# Patient Record
Sex: FEMALE | Race: WHITE | NOT HISPANIC OR LATINO | Employment: FULL TIME | RURAL
[De-identification: names, ages, dates, MRNs, and addresses within clinical notes are randomized per-mention and may not be internally consistent; named-entity substitution may affect disease eponyms.]

---

## 2015-12-14 LAB — CRC RECOMMENDATION EXT: NORMAL

## 2018-10-31 ENCOUNTER — HISTORICAL (OUTPATIENT)
Dept: ADMINISTRATIVE | Facility: HOSPITAL | Age: 53
End: 2018-10-31

## 2018-11-02 LAB
LAB AP CLINICAL INFORMATION: NORMAL
LAB AP DIAGNOSIS - HISTORICAL: NORMAL
LAB AP GROSS PATHOLOGY - HISTORICAL: NORMAL
LAB AP SPECIMEN SUBMITTED - HISTORICAL: NORMAL

## 2019-12-09 ENCOUNTER — HISTORICAL (OUTPATIENT)
Dept: ADMINISTRATIVE | Facility: HOSPITAL | Age: 54
End: 2019-12-09

## 2019-12-11 LAB
LAB AP COMMENTS: NORMAL
LAB AP GENERAL CAT - HISTORICAL: NORMAL
LAB AP INTERPRETATION/RESULT - HISTORICAL: NEGATIVE
LAB AP SPECIMEN ADEQUACY - HISTORICAL: NORMAL
LAB AP SPECIMEN SUBMITTED - HISTORICAL: NORMAL

## 2020-09-15 ENCOUNTER — HISTORICAL (OUTPATIENT)
Dept: ADMINISTRATIVE | Facility: HOSPITAL | Age: 55
End: 2020-09-15

## 2021-02-24 ENCOUNTER — HISTORICAL (OUTPATIENT)
Dept: ADMINISTRATIVE | Facility: HOSPITAL | Age: 56
End: 2021-02-24

## 2021-03-23 ENCOUNTER — HOSPITAL ENCOUNTER (EMERGENCY)
Facility: HOSPITAL | Age: 56
Discharge: HOME | End: 2021-03-23
Payer: COMMERCIAL

## 2021-03-23 VITALS
HEART RATE: 68 BPM | RESPIRATION RATE: 18 BRPM | DIASTOLIC BLOOD PRESSURE: 55 MMHG | WEIGHT: 246 LBS | TEMPERATURE: 99 F | BODY MASS INDEX: 42 KG/M2 | OXYGEN SATURATION: 98 % | SYSTOLIC BLOOD PRESSURE: 119 MMHG | HEIGHT: 64 IN

## 2021-03-23 DIAGNOSIS — G45.9 TIA (TRANSIENT ISCHEMIC ATTACK): Primary | ICD-10-CM

## 2021-03-23 LAB
ALBUMIN SERPL BCP-MCNC: 3.9 G/DL (ref 3.5–5)
ALBUMIN/GLOB SERPL: 1.1 {RATIO}
ALP SERPL-CCNC: 100 U/L (ref 46–118)
ALT SERPL W P-5'-P-CCNC: 37 U/L (ref 13–56)
ANION GAP SERPL CALCULATED.3IONS-SCNC: 5 MMOL/L
APTT PPP: 32.9 SECONDS (ref 25.2–37.3)
AST SERPL W P-5'-P-CCNC: 23 U/L (ref 15–37)
BASOPHILS # BLD AUTO: 0.06 X10E3/UL (ref 0–0.2)
BASOPHILS NFR BLD AUTO: 0.7 % (ref 0–1)
BILIRUB SERPL-MCNC: 0.2 MG/DL (ref 0–1.2)
BILIRUB UR QL STRIP: NEGATIVE MG/DL
BUN SERPL-MCNC: 17 MG/DL (ref 7–18)
BUN/CREAT SERPL: 19.3
CALCIUM SERPL-MCNC: 8.8 MG/DL (ref 8.5–10.1)
CHLORIDE SERPL-SCNC: 101 MMOL/L (ref 98–107)
CLARITY UR: ABNORMAL
CO2 SERPL-SCNC: 27 MMOL/L (ref 21–32)
COLOR UR: ABNORMAL
CREAT SERPL-MCNC: 0.88 MG/DL (ref 0.55–1.02)
EOSINOPHIL # BLD AUTO: 0.17 X10E3/UL (ref 0–0.5)
EOSINOPHIL NFR BLD AUTO: 2.1 % (ref 1–4)
ERYTHROCYTE [DISTWIDTH] IN BLOOD BY AUTOMATED COUNT: 12.2 % (ref 11.5–14.5)
GLOBULIN SER-MCNC: 3.6 G/DL (ref 2–4)
GLUCOSE SERPL-MCNC: 104 MG/DL (ref 74–106)
GLUCOSE SERPL-MCNC: 135 MG/DL (ref 70–105)
GLUCOSE UR STRIP-MCNC: NEGATIVE MG/DL
HCT VFR BLD AUTO: 38.1 % (ref 38–47)
HGB BLD-MCNC: 12.9 G/DL (ref 12–16)
IMM GRANULOCYTES # BLD AUTO: 0.03 X10E3/UL (ref 0–0.04)
IMM GRANULOCYTES NFR BLD: 0.4 % (ref 0–0.4)
INR BLD: 0.94 (ref 0–3.3)
KETONES UR STRIP-SCNC: NEGATIVE MG/DL
LACTATE SERPL-SCNC: 2 MMOL/L (ref 0.4–2)
LEUKOCYTE ESTERASE UR QL STRIP: NEGATIVE LEU/UL
LYMPHOCYTES # BLD AUTO: 1.7 X10E3/UL (ref 1–4.8)
LYMPHOCYTES NFR BLD AUTO: 21.2 % (ref 27–41)
MAGNESIUM SERPL-MCNC: 2.1 MG/DL (ref 1.7–2.3)
MCH RBC QN AUTO: 30.9 PG (ref 27–31)
MCHC RBC AUTO-ENTMCNC: 33.9 G/DL (ref 32–36)
MCV RBC AUTO: 91.1 FL (ref 80–96)
MONOCYTES # BLD AUTO: 0.46 X10E3/UL (ref 0–0.8)
MONOCYTES NFR BLD AUTO: 5.7 % (ref 2–6)
MPC BLD CALC-MCNC: 11.1 FL (ref 9.4–12.4)
NEUTROPHILS # BLD AUTO: 5.61 X10E3/UL (ref 1.8–7.7)
NEUTROPHILS NFR BLD AUTO: 69.9 % (ref 53–65)
NITRITE UR QL STRIP: NEGATIVE
NRBC # BLD AUTO: 0 X10E3/UL (ref 0–0)
NRBC, AUTO (.00): 0 /100 (ref 0–0)
PH UR STRIP: 5.5 PH UNITS (ref 5–8)
PLATELET # BLD AUTO: 259 X10E3/UL (ref 150–400)
POTASSIUM SERPL-SCNC: 3.3 MMOL/L (ref 3.5–5.1)
PROT SERPL-MCNC: 7.5 G/DL (ref 6.4–8.2)
PROT UR QL STRIP: NEGATIVE MG/DL
PROTHROMBIN TIME: 12.1 SECONDS (ref 11.7–14.7)
RBC # BLD AUTO: 4.18 X10E6/UL (ref 4.2–5.4)
RBC # UR STRIP: NEGATIVE ERY/UL
SODIUM SERPL-SCNC: 130 MMOL/L (ref 136–145)
SP GR UR STRIP: 1.01 (ref 1–1.03)
TROPONIN I SERPL-MCNC: <0.017 NG/ML (ref 0–0.06)
UROBILINOGEN UR STRIP-ACNC: 0.2 EU/DL
WBC # BLD AUTO: 8.03 X10E3/UL (ref 4.5–11)

## 2021-03-23 PROCEDURE — 96374 THER/PROPH/DIAG INJ IV PUSH: CPT

## 2021-03-23 PROCEDURE — 80053 COMPREHEN METABOLIC PANEL: CPT

## 2021-03-23 PROCEDURE — 82962 GLUCOSE BLOOD TEST: CPT

## 2021-03-23 PROCEDURE — 83735 ASSAY OF MAGNESIUM: CPT

## 2021-03-23 PROCEDURE — 99284 EMERGENCY DEPT VISIT MOD MDM: CPT | Mod: ,,, | Performed by: NURSE PRACTITIONER

## 2021-03-23 PROCEDURE — 85730 THROMBOPLASTIN TIME PARTIAL: CPT

## 2021-03-23 PROCEDURE — 83605 ASSAY OF LACTIC ACID: CPT

## 2021-03-23 PROCEDURE — 25000003 PHARM REV CODE 250: Performed by: NURSE PRACTITIONER

## 2021-03-23 PROCEDURE — 96361 HYDRATE IV INFUSION ADD-ON: CPT

## 2021-03-23 PROCEDURE — 63600175 PHARM REV CODE 636 W HCPCS: Performed by: NURSE PRACTITIONER

## 2021-03-23 PROCEDURE — 85025 COMPLETE CBC W/AUTO DIFF WBC: CPT

## 2021-03-23 PROCEDURE — 99285 EMERGENCY DEPT VISIT HI MDM: CPT | Mod: 25

## 2021-03-23 PROCEDURE — 84484 ASSAY OF TROPONIN QUANT: CPT

## 2021-03-23 PROCEDURE — 85610 PROTHROMBIN TIME: CPT

## 2021-03-23 PROCEDURE — 99284 PR EMERGENCY DEPT VISIT,LEVEL IV: ICD-10-PCS | Mod: ,,, | Performed by: NURSE PRACTITIONER

## 2021-03-23 PROCEDURE — 36415 COLL VENOUS BLD VENIPUNCTURE: CPT

## 2021-03-23 RX ORDER — LISINOPRIL 5 MG/1
5 TABLET ORAL DAILY
COMMUNITY
End: 2021-08-16 | Stop reason: SDUPTHER

## 2021-03-23 RX ORDER — LANOLIN ALCOHOL/MO/W.PET/CERES
100 CREAM (GRAM) TOPICAL DAILY
COMMUNITY
End: 2021-09-17

## 2021-03-23 RX ORDER — ONDANSETRON 2 MG/ML
4 INJECTION INTRAMUSCULAR; INTRAVENOUS
Status: COMPLETED | OUTPATIENT
Start: 2021-03-23 | End: 2021-03-23

## 2021-03-23 RX ORDER — POTASSIUM CHLORIDE 750 MG/1
10 TABLET, EXTENDED RELEASE ORAL
Status: COMPLETED | OUTPATIENT
Start: 2021-03-23 | End: 2021-03-23

## 2021-03-23 RX ORDER — ACETAMINOPHEN 500 MG
1000 TABLET ORAL
Status: DISCONTINUED | OUTPATIENT
Start: 2021-03-23 | End: 2021-03-23 | Stop reason: HOSPADM

## 2021-03-23 RX ADMIN — POTASSIUM CHLORIDE 10 MEQ: 750 TABLET, FILM COATED, EXTENDED RELEASE ORAL at 03:03

## 2021-03-23 RX ADMIN — SODIUM CHLORIDE 1000 ML: 9 INJECTION, SOLUTION INTRAVENOUS at 02:03

## 2021-03-23 RX ADMIN — ONDANSETRON 4 MG: 2 INJECTION INTRAMUSCULAR; INTRAVENOUS at 02:03

## 2021-06-23 ENCOUNTER — OFFICE VISIT (OUTPATIENT)
Dept: FAMILY MEDICINE | Facility: CLINIC | Age: 56
End: 2021-06-23
Payer: COMMERCIAL

## 2021-06-23 VITALS
BODY MASS INDEX: 44.97 KG/M2 | HEIGHT: 63 IN | HEART RATE: 75 BPM | TEMPERATURE: 97 F | OXYGEN SATURATION: 96 % | DIASTOLIC BLOOD PRESSURE: 82 MMHG | SYSTOLIC BLOOD PRESSURE: 110 MMHG | WEIGHT: 253.81 LBS | RESPIRATION RATE: 20 BRPM

## 2021-06-23 DIAGNOSIS — J01.00 ACUTE NON-RECURRENT MAXILLARY SINUSITIS: Primary | ICD-10-CM

## 2021-06-23 PROCEDURE — 96372 PR INJECTION,THERAP/PROPH/DIAG2ST, IM OR SUBCUT: ICD-10-PCS | Mod: ,,, | Performed by: NURSE PRACTITIONER

## 2021-06-23 PROCEDURE — 3008F PR BODY MASS INDEX (BMI) DOCUMENTED: ICD-10-PCS | Mod: CPTII,,, | Performed by: NURSE PRACTITIONER

## 2021-06-23 PROCEDURE — 3008F BODY MASS INDEX DOCD: CPT | Mod: CPTII,,, | Performed by: NURSE PRACTITIONER

## 2021-06-23 PROCEDURE — 96372 THER/PROPH/DIAG INJ SC/IM: CPT | Mod: ,,, | Performed by: NURSE PRACTITIONER

## 2021-06-23 PROCEDURE — 99213 PR OFFICE/OUTPT VISIT, EST, LEVL III, 20-29 MIN: ICD-10-PCS | Mod: 25,,, | Performed by: NURSE PRACTITIONER

## 2021-06-23 PROCEDURE — 99213 OFFICE O/P EST LOW 20 MIN: CPT | Mod: 25,,, | Performed by: NURSE PRACTITIONER

## 2021-06-23 RX ORDER — CEFTRIAXONE 1 G/1
1 INJECTION, POWDER, FOR SOLUTION INTRAMUSCULAR; INTRAVENOUS
Status: COMPLETED | OUTPATIENT
Start: 2021-06-23 | End: 2021-06-23

## 2021-06-23 RX ORDER — ROSUVASTATIN CALCIUM 5 MG/1
TABLET, COATED ORAL
COMMUNITY
Start: 2021-03-27 | End: 2021-09-17

## 2021-06-23 RX ORDER — METHYLPREDNISOLONE ACETATE 40 MG/ML
40 INJECTION, SUSPENSION INTRA-ARTICULAR; INTRALESIONAL; INTRAMUSCULAR; SOFT TISSUE
Status: COMPLETED | OUTPATIENT
Start: 2021-06-23 | End: 2021-06-23

## 2021-06-23 RX ORDER — AZITHROMYCIN 250 MG/1
TABLET, FILM COATED ORAL
Qty: 6 TABLET | Refills: 0 | Status: SHIPPED | OUTPATIENT
Start: 2021-06-23 | End: 2021-09-17

## 2021-06-23 RX ORDER — ERGOCALCIFEROL 1.25 MG/1
CAPSULE ORAL
COMMUNITY
Start: 2021-06-10 | End: 2022-02-18 | Stop reason: SDUPTHER

## 2021-06-23 RX ORDER — PANTOPRAZOLE SODIUM 40 MG/1
40 TABLET, DELAYED RELEASE ORAL DAILY
COMMUNITY
Start: 2021-06-10 | End: 2021-09-17

## 2021-06-23 RX ADMIN — METHYLPREDNISOLONE ACETATE 40 MG: 40 INJECTION, SUSPENSION INTRA-ARTICULAR; INTRALESIONAL; INTRAMUSCULAR; SOFT TISSUE at 11:06

## 2021-06-23 RX ADMIN — CEFTRIAXONE 1 G: 1 INJECTION, POWDER, FOR SOLUTION INTRAMUSCULAR; INTRAVENOUS at 11:06

## 2021-07-16 ENCOUNTER — OFFICE VISIT (OUTPATIENT)
Dept: FAMILY MEDICINE | Facility: CLINIC | Age: 56
End: 2021-07-16
Payer: COMMERCIAL

## 2021-07-16 VITALS
HEART RATE: 67 BPM | WEIGHT: 254 LBS | BODY MASS INDEX: 43.36 KG/M2 | SYSTOLIC BLOOD PRESSURE: 118 MMHG | HEIGHT: 64 IN | TEMPERATURE: 99 F | RESPIRATION RATE: 16 BRPM | DIASTOLIC BLOOD PRESSURE: 62 MMHG | OXYGEN SATURATION: 99 %

## 2021-07-16 DIAGNOSIS — M25.571 ACUTE RIGHT ANKLE PAIN: Primary | ICD-10-CM

## 2021-07-16 DIAGNOSIS — S93.401A SPRAIN OF RIGHT ANKLE, UNSPECIFIED LIGAMENT, INITIAL ENCOUNTER: ICD-10-CM

## 2021-07-16 PROBLEM — M25.572 LEFT ANKLE PAIN: Status: ACTIVE | Noted: 2021-07-16

## 2021-07-16 PROCEDURE — 3008F PR BODY MASS INDEX (BMI) DOCUMENTED: ICD-10-PCS | Mod: CPTII,,, | Performed by: NURSE PRACTITIONER

## 2021-07-16 PROCEDURE — 99212 OFFICE O/P EST SF 10 MIN: CPT | Mod: ,,, | Performed by: NURSE PRACTITIONER

## 2021-07-16 PROCEDURE — 99212 PR OFFICE/OUTPT VISIT, EST, LEVL II, 10-19 MIN: ICD-10-PCS | Mod: ,,, | Performed by: NURSE PRACTITIONER

## 2021-07-16 PROCEDURE — 3008F BODY MASS INDEX DOCD: CPT | Mod: CPTII,,, | Performed by: NURSE PRACTITIONER

## 2021-08-01 ENCOUNTER — HOSPITAL ENCOUNTER (EMERGENCY)
Facility: HOSPITAL | Age: 56
Discharge: HOME | End: 2021-08-01
Attending: EMERGENCY MEDICINE
Payer: COMMERCIAL

## 2021-08-01 VITALS
HEART RATE: 67 BPM | BODY MASS INDEX: 43.54 KG/M2 | HEIGHT: 64 IN | OXYGEN SATURATION: 97 % | SYSTOLIC BLOOD PRESSURE: 131 MMHG | WEIGHT: 255 LBS | RESPIRATION RATE: 17 BRPM | TEMPERATURE: 98 F | DIASTOLIC BLOOD PRESSURE: 74 MMHG

## 2021-08-01 DIAGNOSIS — M25.571 ACUTE RIGHT ANKLE PAIN: ICD-10-CM

## 2021-08-01 DIAGNOSIS — M54.6 ACUTE RIGHT-SIDED THORACIC BACK PAIN: Primary | ICD-10-CM

## 2021-08-01 DIAGNOSIS — J01.00 ACUTE NON-RECURRENT MAXILLARY SINUSITIS: ICD-10-CM

## 2021-08-01 DIAGNOSIS — M25.572 LEFT ANKLE PAIN: ICD-10-CM

## 2021-08-01 DIAGNOSIS — S93.401A SPRAIN OF RIGHT ANKLE: ICD-10-CM

## 2021-08-01 LAB
ALBUMIN SERPL BCP-MCNC: 3.6 G/DL (ref 3.5–5)
ALBUMIN/GLOB SERPL: 1.1 {RATIO}
ALP SERPL-CCNC: 103 U/L (ref 46–118)
ALT SERPL W P-5'-P-CCNC: 43 U/L (ref 13–56)
ANION GAP SERPL CALCULATED.3IONS-SCNC: 12 MMOL/L (ref 7–16)
AST SERPL W P-5'-P-CCNC: 18 U/L (ref 15–37)
BASOPHILS # BLD AUTO: 0.04 K/UL (ref 0–0.2)
BASOPHILS NFR BLD AUTO: 0.7 % (ref 0–1)
BILIRUB SERPL-MCNC: 0.3 MG/DL (ref 0–1.2)
BILIRUB UR QL STRIP: NEGATIVE
BUN SERPL-MCNC: 13 MG/DL (ref 7–18)
BUN/CREAT SERPL: 16 (ref 6–20)
CALCIUM SERPL-MCNC: 9 MG/DL (ref 8.5–10.1)
CHLORIDE SERPL-SCNC: 107 MMOL/L (ref 98–107)
CLARITY UR: CLEAR
CO2 SERPL-SCNC: 29 MMOL/L (ref 21–32)
COLOR UR: YELLOW
CREAT SERPL-MCNC: 0.8 MG/DL (ref 0.55–1.02)
DIFFERENTIAL METHOD BLD: ABNORMAL
EOSINOPHIL # BLD AUTO: 0.13 K/UL (ref 0–0.5)
EOSINOPHIL NFR BLD AUTO: 2.2 % (ref 1–4)
ERYTHROCYTE [DISTWIDTH] IN BLOOD BY AUTOMATED COUNT: 12.6 % (ref 11.5–14.5)
GLOBULIN SER-MCNC: 3.3 G/DL (ref 2–4)
GLUCOSE SERPL-MCNC: 105 MG/DL (ref 74–106)
GLUCOSE UR STRIP-MCNC: NEGATIVE MG/DL
HCT VFR BLD AUTO: 37 % (ref 38–47)
HGB BLD-MCNC: 12.1 G/DL (ref 12–16)
IMM GRANULOCYTES # BLD AUTO: 0.02 K/UL (ref 0–0.04)
IMM GRANULOCYTES NFR BLD: 0.3 % (ref 0–0.4)
KETONES UR STRIP-SCNC: NEGATIVE MG/DL
LEUKOCYTE ESTERASE UR QL STRIP: NEGATIVE
LIPASE SERPL-CCNC: 79 U/L (ref 73–393)
LYMPHOCYTES # BLD AUTO: 1.58 K/UL (ref 1–4.8)
LYMPHOCYTES NFR BLD AUTO: 27.2 % (ref 27–41)
MCH RBC QN AUTO: 29.8 PG (ref 27–31)
MCHC RBC AUTO-ENTMCNC: 32.7 G/DL (ref 32–36)
MCV RBC AUTO: 91.1 FL (ref 80–96)
MONOCYTES # BLD AUTO: 0.45 K/UL (ref 0–0.8)
MONOCYTES NFR BLD AUTO: 7.8 % (ref 2–6)
MPC BLD CALC-MCNC: 10.2 FL (ref 9.4–12.4)
NEUTROPHILS # BLD AUTO: 3.58 K/UL (ref 1.8–7.7)
NEUTROPHILS NFR BLD AUTO: 61.8 % (ref 53–65)
NITRITE UR QL STRIP: NEGATIVE
NRBC # BLD AUTO: 0 X10E3/UL
NRBC, AUTO (.00): 0 %
PH UR STRIP: 5.5 PH UNITS
PLATELET # BLD AUTO: 251 K/UL (ref 150–400)
POTASSIUM SERPL-SCNC: 4 MMOL/L (ref 3.5–5.1)
PROT SERPL-MCNC: 6.9 G/DL (ref 6.4–8.2)
PROT UR QL STRIP: NEGATIVE
RBC # BLD AUTO: 4.06 M/UL (ref 4.2–5.4)
RBC # UR STRIP: NEGATIVE /UL
SODIUM SERPL-SCNC: 144 MMOL/L (ref 136–145)
SP GR UR STRIP: 1.02
UROBILINOGEN UR STRIP-ACNC: 0.2 MG/DL
WBC # BLD AUTO: 5.8 K/UL (ref 4.5–11)

## 2021-08-01 PROCEDURE — 25000003 PHARM REV CODE 250: Performed by: EMERGENCY MEDICINE

## 2021-08-01 PROCEDURE — 63600175 PHARM REV CODE 636 W HCPCS: Performed by: EMERGENCY MEDICINE

## 2021-08-01 PROCEDURE — 36415 COLL VENOUS BLD VENIPUNCTURE: CPT | Performed by: EMERGENCY MEDICINE

## 2021-08-01 PROCEDURE — 80053 COMPREHEN METABOLIC PANEL: CPT | Performed by: EMERGENCY MEDICINE

## 2021-08-01 PROCEDURE — 83690 ASSAY OF LIPASE: CPT | Performed by: EMERGENCY MEDICINE

## 2021-08-01 PROCEDURE — 99284 EMERGENCY DEPT VISIT MOD MDM: CPT

## 2021-08-01 PROCEDURE — 96372 THER/PROPH/DIAG INJ SC/IM: CPT

## 2021-08-01 PROCEDURE — 99284 PR EMERGENCY DEPT VISIT,LEVEL IV: ICD-10-PCS | Mod: ,,, | Performed by: EMERGENCY MEDICINE

## 2021-08-01 PROCEDURE — 99284 EMERGENCY DEPT VISIT MOD MDM: CPT | Mod: ,,, | Performed by: EMERGENCY MEDICINE

## 2021-08-01 PROCEDURE — 81003 URINALYSIS AUTO W/O SCOPE: CPT | Performed by: EMERGENCY MEDICINE

## 2021-08-01 PROCEDURE — 85025 COMPLETE CBC W/AUTO DIFF WBC: CPT | Performed by: EMERGENCY MEDICINE

## 2021-08-01 RX ORDER — KETOROLAC TROMETHAMINE 30 MG/ML
60 INJECTION, SOLUTION INTRAMUSCULAR; INTRAVENOUS
Status: COMPLETED | OUTPATIENT
Start: 2021-08-01 | End: 2021-08-01

## 2021-08-01 RX ORDER — METHOCARBAMOL 500 MG/1
TABLET, FILM COATED ORAL
Qty: 30 TABLET | Refills: 0 | Status: SHIPPED | OUTPATIENT
Start: 2021-08-01 | End: 2021-09-17

## 2021-08-01 RX ADMIN — KETOROLAC TROMETHAMINE 60 MG: 30 INJECTION, SOLUTION INTRAMUSCULAR; INTRAVENOUS at 08:08

## 2021-08-01 RX ADMIN — LIDOCAINE HYDROCHLORIDE: 20 SOLUTION ORAL; TOPICAL at 08:08

## 2021-08-16 DIAGNOSIS — I10 HYPERTENSION, UNSPECIFIED TYPE: Primary | ICD-10-CM

## 2021-08-16 RX ORDER — LISINOPRIL 5 MG/1
5 TABLET ORAL DAILY
Qty: 30 TABLET | Refills: 5 | Status: SHIPPED | OUTPATIENT
Start: 2021-08-16 | End: 2022-01-14 | Stop reason: SDUPTHER

## 2021-08-30 ENCOUNTER — OFFICE VISIT (OUTPATIENT)
Dept: FAMILY MEDICINE | Facility: CLINIC | Age: 56
End: 2021-08-30
Payer: COMMERCIAL

## 2021-08-30 VITALS
HEART RATE: 76 BPM | RESPIRATION RATE: 18 BRPM | OXYGEN SATURATION: 98 % | SYSTOLIC BLOOD PRESSURE: 120 MMHG | BODY MASS INDEX: 43.77 KG/M2 | WEIGHT: 255 LBS | TEMPERATURE: 97 F | DIASTOLIC BLOOD PRESSURE: 84 MMHG

## 2021-08-30 DIAGNOSIS — R10.10 PAIN OF UPPER ABDOMEN: ICD-10-CM

## 2021-08-30 DIAGNOSIS — K21.9 GASTROESOPHAGEAL REFLUX DISEASE, UNSPECIFIED WHETHER ESOPHAGITIS PRESENT: Primary | ICD-10-CM

## 2021-08-30 DIAGNOSIS — Z11.52 ENCOUNTER FOR SCREENING FOR COVID-19: ICD-10-CM

## 2021-08-30 LAB
AMYLASE SERPL-CCNC: 25 U/L (ref 25–115)
LIPASE SERPL-CCNC: 99 U/L (ref 73–393)

## 2021-08-30 PROCEDURE — 3008F PR BODY MASS INDEX (BMI) DOCUMENTED: ICD-10-PCS | Mod: CPTII,,, | Performed by: NURSE PRACTITIONER

## 2021-08-30 PROCEDURE — 3079F PR MOST RECENT DIASTOLIC BLOOD PRESSURE 80-89 MM HG: ICD-10-PCS | Mod: CPTII,,, | Performed by: NURSE PRACTITIONER

## 2021-08-30 PROCEDURE — 99213 OFFICE O/P EST LOW 20 MIN: CPT | Mod: ,,, | Performed by: NURSE PRACTITIONER

## 2021-08-30 PROCEDURE — 3074F PR MOST RECENT SYSTOLIC BLOOD PRESSURE < 130 MM HG: ICD-10-PCS | Mod: CPTII,,, | Performed by: NURSE PRACTITIONER

## 2021-08-30 PROCEDURE — 86769 SARS-COV-2 COVID-19 ANTIBODY: CPT | Mod: 90,,, | Performed by: CLINICAL MEDICAL LABORATORY

## 2021-08-30 PROCEDURE — 82150 AMYLASE: ICD-10-PCS | Mod: ,,, | Performed by: CLINICAL MEDICAL LABORATORY

## 2021-08-30 PROCEDURE — 1159F PR MEDICATION LIST DOCUMENTED IN MEDICAL RECORD: ICD-10-PCS | Mod: CPTII,,, | Performed by: NURSE PRACTITIONER

## 2021-08-30 PROCEDURE — 83690 ASSAY OF LIPASE: CPT | Mod: ,,, | Performed by: CLINICAL MEDICAL LABORATORY

## 2021-08-30 PROCEDURE — 83690 LIPASE: ICD-10-PCS | Mod: ,,, | Performed by: CLINICAL MEDICAL LABORATORY

## 2021-08-30 PROCEDURE — 86769 SARS-COV-2 SPIKE AB, SEMI-QUANT, S: ICD-10-PCS | Mod: 90,,, | Performed by: CLINICAL MEDICAL LABORATORY

## 2021-08-30 PROCEDURE — 1160F PR REVIEW ALL MEDS BY PRESCRIBER/CLIN PHARMACIST DOCUMENTED: ICD-10-PCS | Mod: CPTII,,, | Performed by: NURSE PRACTITIONER

## 2021-08-30 PROCEDURE — 82150 ASSAY OF AMYLASE: CPT | Mod: ,,, | Performed by: CLINICAL MEDICAL LABORATORY

## 2021-08-30 PROCEDURE — 99213 PR OFFICE/OUTPT VISIT, EST, LEVL III, 20-29 MIN: ICD-10-PCS | Mod: ,,, | Performed by: NURSE PRACTITIONER

## 2021-08-30 PROCEDURE — 1159F MED LIST DOCD IN RCRD: CPT | Mod: CPTII,,, | Performed by: NURSE PRACTITIONER

## 2021-08-30 PROCEDURE — 3008F BODY MASS INDEX DOCD: CPT | Mod: CPTII,,, | Performed by: NURSE PRACTITIONER

## 2021-08-30 PROCEDURE — 3074F SYST BP LT 130 MM HG: CPT | Mod: CPTII,,, | Performed by: NURSE PRACTITIONER

## 2021-08-30 PROCEDURE — 3079F DIAST BP 80-89 MM HG: CPT | Mod: CPTII,,, | Performed by: NURSE PRACTITIONER

## 2021-08-30 PROCEDURE — 1160F RVW MEDS BY RX/DR IN RCRD: CPT | Mod: CPTII,,, | Performed by: NURSE PRACTITIONER

## 2021-08-30 RX ORDER — ESOMEPRAZOLE MAGNESIUM 40 MG/1
40 CAPSULE, DELAYED RELEASE ORAL
COMMUNITY
End: 2021-08-30 | Stop reason: SDUPTHER

## 2021-08-30 RX ORDER — ESOMEPRAZOLE MAGNESIUM 40 MG/1
40 CAPSULE, DELAYED RELEASE ORAL 2 TIMES DAILY
Qty: 60 CAPSULE | Refills: 0 | Status: SHIPPED | OUTPATIENT
Start: 2021-08-30 | End: 2021-09-28 | Stop reason: SDUPTHER

## 2021-09-02 LAB
M SARS-COV-2 SPIKE AB, INTERP, S: NEGATIVE
M SARS-COV-2 SPIKE AB, QUANT, S: <0.4 U/ML

## 2021-09-17 ENCOUNTER — OFFICE VISIT (OUTPATIENT)
Dept: FAMILY MEDICINE | Facility: CLINIC | Age: 56
End: 2021-09-17
Payer: COMMERCIAL

## 2021-09-17 VITALS
RESPIRATION RATE: 20 BRPM | OXYGEN SATURATION: 96 % | HEIGHT: 64 IN | HEART RATE: 76 BPM | WEIGHT: 255 LBS | TEMPERATURE: 97 F | BODY MASS INDEX: 43.54 KG/M2 | DIASTOLIC BLOOD PRESSURE: 74 MMHG | SYSTOLIC BLOOD PRESSURE: 118 MMHG

## 2021-09-17 DIAGNOSIS — J01.00 ACUTE NON-RECURRENT MAXILLARY SINUSITIS: Primary | ICD-10-CM

## 2021-09-17 DIAGNOSIS — R05.9 COUGH: ICD-10-CM

## 2021-09-17 LAB
CTP QC/QA: YES
FLUAV AG NPH QL: NEGATIVE
FLUBV AG NPH QL: NEGATIVE
SARS-COV-2 AG RESP QL IA.RAPID: NEGATIVE

## 2021-09-17 PROCEDURE — 1160F RVW MEDS BY RX/DR IN RCRD: CPT | Mod: ,,, | Performed by: NURSE PRACTITIONER

## 2021-09-17 PROCEDURE — 3074F PR MOST RECENT SYSTOLIC BLOOD PRESSURE < 130 MM HG: ICD-10-PCS | Mod: ,,, | Performed by: NURSE PRACTITIONER

## 2021-09-17 PROCEDURE — 1160F PR REVIEW ALL MEDS BY PRESCRIBER/CLIN PHARMACIST DOCUMENTED: ICD-10-PCS | Mod: ,,, | Performed by: NURSE PRACTITIONER

## 2021-09-17 PROCEDURE — 96372 PR INJECTION,THERAP/PROPH/DIAG2ST, IM OR SUBCUT: ICD-10-PCS | Mod: ,,, | Performed by: NURSE PRACTITIONER

## 2021-09-17 PROCEDURE — 3078F DIAST BP <80 MM HG: CPT | Mod: ,,, | Performed by: NURSE PRACTITIONER

## 2021-09-17 PROCEDURE — 87428 SARSCOV & INF VIR A&B AG IA: CPT | Mod: QW,,, | Performed by: NURSE PRACTITIONER

## 2021-09-17 PROCEDURE — 4010F ACE/ARB THERAPY RXD/TAKEN: CPT | Mod: ,,, | Performed by: NURSE PRACTITIONER

## 2021-09-17 PROCEDURE — 3078F PR MOST RECENT DIASTOLIC BLOOD PRESSURE < 80 MM HG: ICD-10-PCS | Mod: ,,, | Performed by: NURSE PRACTITIONER

## 2021-09-17 PROCEDURE — 3074F SYST BP LT 130 MM HG: CPT | Mod: ,,, | Performed by: NURSE PRACTITIONER

## 2021-09-17 PROCEDURE — 3008F BODY MASS INDEX DOCD: CPT | Mod: ,,, | Performed by: NURSE PRACTITIONER

## 2021-09-17 PROCEDURE — 4010F PR ACE/ARB THEARPY RXD/TAKEN: ICD-10-PCS | Mod: ,,, | Performed by: NURSE PRACTITIONER

## 2021-09-17 PROCEDURE — 3008F PR BODY MASS INDEX (BMI) DOCUMENTED: ICD-10-PCS | Mod: ,,, | Performed by: NURSE PRACTITIONER

## 2021-09-17 PROCEDURE — 1159F PR MEDICATION LIST DOCUMENTED IN MEDICAL RECORD: ICD-10-PCS | Mod: ,,, | Performed by: NURSE PRACTITIONER

## 2021-09-17 PROCEDURE — 87428 POCT SARS-COV2 (COVID) WITH FLU ANTIGEN: ICD-10-PCS | Mod: QW,,, | Performed by: NURSE PRACTITIONER

## 2021-09-17 PROCEDURE — 99213 OFFICE O/P EST LOW 20 MIN: CPT | Mod: 25,,, | Performed by: NURSE PRACTITIONER

## 2021-09-17 PROCEDURE — 99213 PR OFFICE/OUTPT VISIT, EST, LEVL III, 20-29 MIN: ICD-10-PCS | Mod: 25,,, | Performed by: NURSE PRACTITIONER

## 2021-09-17 PROCEDURE — 1159F MED LIST DOCD IN RCRD: CPT | Mod: ,,, | Performed by: NURSE PRACTITIONER

## 2021-09-17 PROCEDURE — 96372 THER/PROPH/DIAG INJ SC/IM: CPT | Mod: ,,, | Performed by: NURSE PRACTITIONER

## 2021-09-17 RX ORDER — CHOLECALCIFEROL (VITAMIN D3) 125 MCG
CAPSULE ORAL
COMMUNITY
Start: 2021-09-13 | End: 2022-02-18 | Stop reason: SDUPTHER

## 2021-09-17 RX ORDER — METHYLPREDNISOLONE ACETATE 40 MG/ML
40 INJECTION, SUSPENSION INTRA-ARTICULAR; INTRALESIONAL; INTRAMUSCULAR; SOFT TISSUE
Status: COMPLETED | OUTPATIENT
Start: 2021-09-17 | End: 2021-09-17

## 2021-09-17 RX ORDER — AZITHROMYCIN 250 MG/1
TABLET, FILM COATED ORAL
Qty: 6 TABLET | Refills: 0 | Status: SHIPPED | OUTPATIENT
Start: 2021-09-17 | End: 2022-01-14

## 2021-09-17 RX ORDER — CLOTRIMAZOLE AND BETAMETHASONE DIPROPIONATE 10; .64 MG/G; MG/G
CREAM TOPICAL 2 TIMES DAILY
COMMUNITY
Start: 2021-08-13 | End: 2022-01-14

## 2021-09-17 RX ORDER — CEFTRIAXONE 1 G/1
1 INJECTION, POWDER, FOR SOLUTION INTRAMUSCULAR; INTRAVENOUS
Status: COMPLETED | OUTPATIENT
Start: 2021-09-17 | End: 2021-09-17

## 2021-09-17 RX ADMIN — METHYLPREDNISOLONE ACETATE 40 MG: 40 INJECTION, SUSPENSION INTRA-ARTICULAR; INTRALESIONAL; INTRAMUSCULAR; SOFT TISSUE at 02:09

## 2021-09-17 RX ADMIN — CEFTRIAXONE 1 G: 1 INJECTION, POWDER, FOR SOLUTION INTRAMUSCULAR; INTRAVENOUS at 01:09

## 2021-09-27 PROBLEM — J01.00 ACUTE NON-RECURRENT MAXILLARY SINUSITIS: Status: RESOLVED | Noted: 2021-06-23 | Resolved: 2021-09-27

## 2021-09-28 ENCOUNTER — OFFICE VISIT (OUTPATIENT)
Dept: FAMILY MEDICINE | Facility: CLINIC | Age: 56
End: 2021-09-28
Payer: COMMERCIAL

## 2021-09-28 VITALS
WEIGHT: 255.38 LBS | DIASTOLIC BLOOD PRESSURE: 88 MMHG | HEIGHT: 65 IN | SYSTOLIC BLOOD PRESSURE: 130 MMHG | RESPIRATION RATE: 20 BRPM | OXYGEN SATURATION: 97 % | HEART RATE: 64 BPM | BODY MASS INDEX: 42.55 KG/M2 | TEMPERATURE: 98 F

## 2021-09-28 DIAGNOSIS — K58.0 IRRITABLE BOWEL SYNDROME WITH DIARRHEA: ICD-10-CM

## 2021-09-28 DIAGNOSIS — M79.7 FIBROMYALGIA: Primary | ICD-10-CM

## 2021-09-28 DIAGNOSIS — K21.9 GASTROESOPHAGEAL REFLUX DISEASE, UNSPECIFIED WHETHER ESOPHAGITIS PRESENT: ICD-10-CM

## 2021-09-28 PROCEDURE — 96372 THER/PROPH/DIAG INJ SC/IM: CPT | Mod: ,,, | Performed by: NURSE PRACTITIONER

## 2021-09-28 PROCEDURE — 99213 PR OFFICE/OUTPT VISIT, EST, LEVL III, 20-29 MIN: ICD-10-PCS | Mod: 25,,, | Performed by: NURSE PRACTITIONER

## 2021-09-28 PROCEDURE — 4010F ACE/ARB THERAPY RXD/TAKEN: CPT | Mod: CPTII,,, | Performed by: NURSE PRACTITIONER

## 2021-09-28 PROCEDURE — 1160F PR REVIEW ALL MEDS BY PRESCRIBER/CLIN PHARMACIST DOCUMENTED: ICD-10-PCS | Mod: CPTII,,, | Performed by: NURSE PRACTITIONER

## 2021-09-28 PROCEDURE — 96372 PR INJECTION,THERAP/PROPH/DIAG2ST, IM OR SUBCUT: ICD-10-PCS | Mod: ,,, | Performed by: NURSE PRACTITIONER

## 2021-09-28 PROCEDURE — 3008F PR BODY MASS INDEX (BMI) DOCUMENTED: ICD-10-PCS | Mod: CPTII,,, | Performed by: NURSE PRACTITIONER

## 2021-09-28 PROCEDURE — 3079F DIAST BP 80-89 MM HG: CPT | Mod: CPTII,,, | Performed by: NURSE PRACTITIONER

## 2021-09-28 PROCEDURE — 4010F PR ACE/ARB THEARPY RXD/TAKEN: ICD-10-PCS | Mod: CPTII,,, | Performed by: NURSE PRACTITIONER

## 2021-09-28 PROCEDURE — 1159F PR MEDICATION LIST DOCUMENTED IN MEDICAL RECORD: ICD-10-PCS | Mod: CPTII,,, | Performed by: NURSE PRACTITIONER

## 2021-09-28 PROCEDURE — 99213 OFFICE O/P EST LOW 20 MIN: CPT | Mod: 25,,, | Performed by: NURSE PRACTITIONER

## 2021-09-28 PROCEDURE — 1160F RVW MEDS BY RX/DR IN RCRD: CPT | Mod: CPTII,,, | Performed by: NURSE PRACTITIONER

## 2021-09-28 PROCEDURE — 3075F SYST BP GE 130 - 139MM HG: CPT | Mod: CPTII,,, | Performed by: NURSE PRACTITIONER

## 2021-09-28 PROCEDURE — 1159F MED LIST DOCD IN RCRD: CPT | Mod: CPTII,,, | Performed by: NURSE PRACTITIONER

## 2021-09-28 PROCEDURE — 3079F PR MOST RECENT DIASTOLIC BLOOD PRESSURE 80-89 MM HG: ICD-10-PCS | Mod: CPTII,,, | Performed by: NURSE PRACTITIONER

## 2021-09-28 PROCEDURE — 3008F BODY MASS INDEX DOCD: CPT | Mod: CPTII,,, | Performed by: NURSE PRACTITIONER

## 2021-09-28 PROCEDURE — 3075F PR MOST RECENT SYSTOLIC BLOOD PRESS GE 130-139MM HG: ICD-10-PCS | Mod: CPTII,,, | Performed by: NURSE PRACTITIONER

## 2021-09-28 RX ORDER — ESOMEPRAZOLE MAGNESIUM 40 MG/1
40 CAPSULE, DELAYED RELEASE ORAL
Qty: 30 CAPSULE | Refills: 5 | Status: SHIPPED | OUTPATIENT
Start: 2021-09-28 | End: 2021-10-04

## 2021-09-28 RX ORDER — DICYCLOMINE HYDROCHLORIDE 10 MG/1
10 CAPSULE ORAL
Qty: 20 CAPSULE | Refills: 0 | Status: SHIPPED | OUTPATIENT
Start: 2021-09-28 | End: 2021-10-28

## 2021-09-28 RX ORDER — KETOROLAC TROMETHAMINE 30 MG/ML
60 INJECTION, SOLUTION INTRAMUSCULAR; INTRAVENOUS
Status: COMPLETED | OUTPATIENT
Start: 2021-09-28 | End: 2021-09-28

## 2021-09-28 RX ADMIN — KETOROLAC TROMETHAMINE 60 MG: 30 INJECTION, SOLUTION INTRAMUSCULAR; INTRAVENOUS at 09:09

## 2021-10-01 ENCOUNTER — HOSPITAL ENCOUNTER (EMERGENCY)
Facility: HOSPITAL | Age: 56
Discharge: HOME | End: 2021-10-01
Attending: EMERGENCY MEDICINE
Payer: COMMERCIAL

## 2021-10-01 VITALS
SYSTOLIC BLOOD PRESSURE: 128 MMHG | BODY MASS INDEX: 43.54 KG/M2 | TEMPERATURE: 99 F | RESPIRATION RATE: 14 BRPM | OXYGEN SATURATION: 99 % | HEART RATE: 60 BPM | HEIGHT: 64 IN | WEIGHT: 255 LBS | DIASTOLIC BLOOD PRESSURE: 72 MMHG

## 2021-10-01 DIAGNOSIS — S93.401A SPRAIN OF RIGHT ANKLE: ICD-10-CM

## 2021-10-01 DIAGNOSIS — M54.9 BACK PAIN, UNSPECIFIED BACK LOCATION, UNSPECIFIED BACK PAIN LATERALITY, UNSPECIFIED CHRONICITY: Primary | ICD-10-CM

## 2021-10-01 DIAGNOSIS — K21.9 GASTROESOPHAGEAL REFLUX DISEASE, UNSPECIFIED WHETHER ESOPHAGITIS PRESENT: ICD-10-CM

## 2021-10-01 DIAGNOSIS — M25.572 LEFT ANKLE PAIN: ICD-10-CM

## 2021-10-01 DIAGNOSIS — R06.02 SHORTNESS OF BREATH: ICD-10-CM

## 2021-10-01 DIAGNOSIS — M25.571 ACUTE RIGHT ANKLE PAIN: ICD-10-CM

## 2021-10-01 LAB
ALBUMIN SERPL BCP-MCNC: 4 G/DL (ref 3.5–5)
ALBUMIN/GLOB SERPL: 1.3 {RATIO}
ALP SERPL-CCNC: 104 U/L (ref 46–118)
ALT SERPL W P-5'-P-CCNC: 36 U/L (ref 13–56)
ANION GAP SERPL CALCULATED.3IONS-SCNC: 15 MMOL/L (ref 7–16)
AST SERPL W P-5'-P-CCNC: 18 U/L (ref 15–37)
BASOPHILS # BLD AUTO: 0.05 K/UL (ref 0–0.2)
BASOPHILS NFR BLD AUTO: 0.7 % (ref 0–1)
BILIRUB SERPL-MCNC: 0.3 MG/DL (ref 0–1.2)
BUN SERPL-MCNC: 11 MG/DL (ref 7–18)
BUN/CREAT SERPL: 14 (ref 6–20)
CALCIUM SERPL-MCNC: 8.8 MG/DL (ref 8.5–10.1)
CHLORIDE SERPL-SCNC: 106 MMOL/L (ref 98–107)
CO2 SERPL-SCNC: 30 MMOL/L (ref 21–32)
CREAT SERPL-MCNC: 0.79 MG/DL (ref 0.55–1.02)
DIFFERENTIAL METHOD BLD: ABNORMAL
EOSINOPHIL # BLD AUTO: 0.13 K/UL (ref 0–0.5)
EOSINOPHIL NFR BLD AUTO: 1.9 % (ref 1–4)
ERYTHROCYTE [DISTWIDTH] IN BLOOD BY AUTOMATED COUNT: 12.3 % (ref 11.5–14.5)
GLOBULIN SER-MCNC: 3.1 G/DL (ref 2–4)
GLUCOSE SERPL-MCNC: 92 MG/DL (ref 74–106)
HCT VFR BLD AUTO: 37.7 % (ref 38–47)
HGB BLD-MCNC: 12.7 G/DL (ref 12–16)
IMM GRANULOCYTES # BLD AUTO: 0.01 K/UL (ref 0–0.04)
IMM GRANULOCYTES NFR BLD: 0.1 % (ref 0–0.4)
LYMPHOCYTES # BLD AUTO: 1.79 K/UL (ref 1–4.8)
LYMPHOCYTES NFR BLD AUTO: 26.8 % (ref 27–41)
MCH RBC QN AUTO: 30 PG (ref 27–31)
MCHC RBC AUTO-ENTMCNC: 33.7 G/DL (ref 32–36)
MCV RBC AUTO: 88.9 FL (ref 80–96)
MONOCYTES # BLD AUTO: 0.48 K/UL (ref 0–0.8)
MONOCYTES NFR BLD AUTO: 7.2 % (ref 2–6)
MPC BLD CALC-MCNC: 10.5 FL (ref 9.4–12.4)
NEUTROPHILS # BLD AUTO: 4.22 K/UL (ref 1.8–7.7)
NEUTROPHILS NFR BLD AUTO: 63.3 % (ref 53–65)
NRBC # BLD AUTO: 0 X10E3/UL
NRBC, AUTO (.00): 0 %
NT-PROBNP SERPL-MCNC: 25 PG/ML (ref 1–125)
PLATELET # BLD AUTO: 283 K/UL (ref 150–400)
POTASSIUM SERPL-SCNC: 4 MMOL/L (ref 3.5–5.1)
PROT SERPL-MCNC: 7.1 G/DL (ref 6.4–8.2)
RBC # BLD AUTO: 4.24 M/UL (ref 4.2–5.4)
SODIUM SERPL-SCNC: 147 MMOL/L (ref 136–145)
TROPONIN I SERPL-MCNC: <0.017 NG/ML
WBC # BLD AUTO: 6.68 K/UL (ref 4.5–11)

## 2021-10-01 PROCEDURE — 93010 EKG 12-LEAD: ICD-10-PCS | Mod: ,,, | Performed by: HOSPITALIST

## 2021-10-01 PROCEDURE — 84484 ASSAY OF TROPONIN QUANT: CPT | Performed by: EMERGENCY MEDICINE

## 2021-10-01 PROCEDURE — 36415 COLL VENOUS BLD VENIPUNCTURE: CPT | Performed by: EMERGENCY MEDICINE

## 2021-10-01 PROCEDURE — 99283 EMERGENCY DEPT VISIT LOW MDM: CPT | Mod: ,,, | Performed by: EMERGENCY MEDICINE

## 2021-10-01 PROCEDURE — 99283 PR EMERGENCY DEPT VISIT,LEVEL III: ICD-10-PCS | Mod: ,,, | Performed by: EMERGENCY MEDICINE

## 2021-10-01 PROCEDURE — 80053 COMPREHEN METABOLIC PANEL: CPT | Performed by: EMERGENCY MEDICINE

## 2021-10-01 PROCEDURE — 83880 ASSAY OF NATRIURETIC PEPTIDE: CPT | Performed by: EMERGENCY MEDICINE

## 2021-10-01 PROCEDURE — 99285 EMERGENCY DEPT VISIT HI MDM: CPT | Mod: 25

## 2021-10-01 PROCEDURE — 93005 ELECTROCARDIOGRAM TRACING: CPT

## 2021-10-01 PROCEDURE — 93010 ELECTROCARDIOGRAM REPORT: CPT | Mod: ,,, | Performed by: HOSPITALIST

## 2021-10-01 PROCEDURE — 25000003 PHARM REV CODE 250: Performed by: EMERGENCY MEDICINE

## 2021-10-01 PROCEDURE — 85025 COMPLETE CBC W/AUTO DIFF WBC: CPT | Performed by: EMERGENCY MEDICINE

## 2021-10-01 RX ORDER — LOPERAMIDE HYDROCHLORIDE 2 MG/1
2 CAPSULE ORAL 4 TIMES DAILY PRN
Qty: 20 CAPSULE | Refills: 0 | Status: SHIPPED | OUTPATIENT
Start: 2021-10-01 | End: 2021-10-11

## 2021-10-01 RX ORDER — SUCRALFATE 1 G/1
1 TABLET ORAL 4 TIMES DAILY
Qty: 120 TABLET | Refills: 1 | Status: SHIPPED | OUTPATIENT
Start: 2021-10-01 | End: 2022-01-14

## 2021-10-01 RX ADMIN — LIDOCAINE HYDROCHLORIDE: 20 SOLUTION ORAL; TOPICAL at 07:10

## 2021-10-04 ENCOUNTER — OFFICE VISIT (OUTPATIENT)
Dept: GASTROENTEROLOGY | Facility: CLINIC | Age: 56
End: 2021-10-04
Payer: COMMERCIAL

## 2021-10-04 VITALS
BODY MASS INDEX: 44.73 KG/M2 | HEART RATE: 68 BPM | HEIGHT: 64 IN | SYSTOLIC BLOOD PRESSURE: 140 MMHG | DIASTOLIC BLOOD PRESSURE: 50 MMHG | WEIGHT: 262 LBS | OXYGEN SATURATION: 97 %

## 2021-10-04 DIAGNOSIS — K76.0 FATTY LIVER: ICD-10-CM

## 2021-10-04 DIAGNOSIS — K21.9 GASTROESOPHAGEAL REFLUX DISEASE, UNSPECIFIED WHETHER ESOPHAGITIS PRESENT: Primary | ICD-10-CM

## 2021-10-04 PROCEDURE — 3008F PR BODY MASS INDEX (BMI) DOCUMENTED: ICD-10-PCS | Mod: CPTII,,, | Performed by: NURSE PRACTITIONER

## 2021-10-04 PROCEDURE — 4010F PR ACE/ARB THEARPY RXD/TAKEN: ICD-10-PCS | Mod: CPTII,,, | Performed by: NURSE PRACTITIONER

## 2021-10-04 PROCEDURE — 1160F PR REVIEW ALL MEDS BY PRESCRIBER/CLIN PHARMACIST DOCUMENTED: ICD-10-PCS | Mod: CPTII,,, | Performed by: NURSE PRACTITIONER

## 2021-10-04 PROCEDURE — 1159F PR MEDICATION LIST DOCUMENTED IN MEDICAL RECORD: ICD-10-PCS | Mod: CPTII,,, | Performed by: NURSE PRACTITIONER

## 2021-10-04 PROCEDURE — 3078F DIAST BP <80 MM HG: CPT | Mod: CPTII,,, | Performed by: NURSE PRACTITIONER

## 2021-10-04 PROCEDURE — 1160F RVW MEDS BY RX/DR IN RCRD: CPT | Mod: CPTII,,, | Performed by: NURSE PRACTITIONER

## 2021-10-04 PROCEDURE — 99214 PR OFFICE/OUTPT VISIT, EST, LEVL IV, 30-39 MIN: ICD-10-PCS | Mod: ,,, | Performed by: NURSE PRACTITIONER

## 2021-10-04 PROCEDURE — 3078F PR MOST RECENT DIASTOLIC BLOOD PRESSURE < 80 MM HG: ICD-10-PCS | Mod: CPTII,,, | Performed by: NURSE PRACTITIONER

## 2021-10-04 PROCEDURE — 3077F PR MOST RECENT SYSTOLIC BLOOD PRESSURE >= 140 MM HG: ICD-10-PCS | Mod: CPTII,,, | Performed by: NURSE PRACTITIONER

## 2021-10-04 PROCEDURE — 3008F BODY MASS INDEX DOCD: CPT | Mod: CPTII,,, | Performed by: NURSE PRACTITIONER

## 2021-10-04 PROCEDURE — 4010F ACE/ARB THERAPY RXD/TAKEN: CPT | Mod: CPTII,,, | Performed by: NURSE PRACTITIONER

## 2021-10-04 PROCEDURE — 99214 OFFICE O/P EST MOD 30 MIN: CPT | Mod: ,,, | Performed by: NURSE PRACTITIONER

## 2021-10-04 PROCEDURE — 3077F SYST BP >= 140 MM HG: CPT | Mod: CPTII,,, | Performed by: NURSE PRACTITIONER

## 2021-10-04 PROCEDURE — 1159F MED LIST DOCD IN RCRD: CPT | Mod: CPTII,,, | Performed by: NURSE PRACTITIONER

## 2021-10-04 RX ORDER — PANTOPRAZOLE SODIUM 40 MG/1
40 TABLET, DELAYED RELEASE ORAL DAILY
COMMUNITY
End: 2021-10-04 | Stop reason: SDUPTHER

## 2021-10-04 RX ORDER — PANTOPRAZOLE SODIUM 40 MG/1
40 TABLET, DELAYED RELEASE ORAL DAILY
Qty: 30 TABLET | Refills: 5 | Status: SHIPPED | OUTPATIENT
Start: 2021-10-04 | End: 2021-10-21 | Stop reason: SDUPTHER

## 2021-10-08 ENCOUNTER — HOSPITAL ENCOUNTER (OUTPATIENT)
Dept: RADIOLOGY | Facility: HOSPITAL | Age: 56
Discharge: HOME | End: 2021-10-08
Attending: NURSE PRACTITIONER
Payer: COMMERCIAL

## 2021-10-08 DIAGNOSIS — K76.0 FATTY LIVER: ICD-10-CM

## 2021-10-08 PROCEDURE — 76705 US ABDOMEN LIMITED: ICD-10-PCS | Mod: 26,,,

## 2021-10-08 PROCEDURE — 76705 ECHO EXAM OF ABDOMEN: CPT | Mod: 26,,,

## 2021-10-08 PROCEDURE — 76705 ECHO EXAM OF ABDOMEN: CPT | Mod: TC

## 2021-10-12 ENCOUNTER — TELEPHONE (OUTPATIENT)
Dept: GASTROENTEROLOGY | Facility: CLINIC | Age: 56
End: 2021-10-12

## 2021-10-14 DIAGNOSIS — Z01.818 PRE-OP TESTING: ICD-10-CM

## 2021-10-19 DIAGNOSIS — Z11.59 SPECIAL SCREENING EXAMINATION FOR VIRAL DISEASE: Primary | ICD-10-CM

## 2021-10-21 ENCOUNTER — ANESTHESIA (OUTPATIENT)
Dept: GASTROENTEROLOGY | Facility: HOSPITAL | Age: 56
End: 2021-10-21
Payer: COMMERCIAL

## 2021-10-21 ENCOUNTER — ANESTHESIA EVENT (OUTPATIENT)
Dept: GASTROENTEROLOGY | Facility: HOSPITAL | Age: 56
End: 2021-10-21
Payer: COMMERCIAL

## 2021-10-21 ENCOUNTER — HOSPITAL ENCOUNTER (OUTPATIENT)
Dept: GASTROENTEROLOGY | Facility: HOSPITAL | Age: 56
Discharge: HOME | End: 2021-10-21
Attending: NURSE PRACTITIONER
Payer: COMMERCIAL

## 2021-10-21 VITALS
SYSTOLIC BLOOD PRESSURE: 126 MMHG | RESPIRATION RATE: 14 BRPM | TEMPERATURE: 98 F | OXYGEN SATURATION: 99 % | HEART RATE: 55 BPM | DIASTOLIC BLOOD PRESSURE: 62 MMHG

## 2021-10-21 DIAGNOSIS — K21.9 GASTROESOPHAGEAL REFLUX DISEASE, UNSPECIFIED WHETHER ESOPHAGITIS PRESENT: ICD-10-CM

## 2021-10-21 DIAGNOSIS — K76.0 FATTY LIVER: ICD-10-CM

## 2021-10-21 DIAGNOSIS — R07.9 CHEST PAIN IN ADULT: ICD-10-CM

## 2021-10-21 DIAGNOSIS — K29.00 ACUTE SUPERFICIAL GASTRITIS WITHOUT HEMORRHAGE: ICD-10-CM

## 2021-10-21 PROCEDURE — 27201423 OPTIME MED/SURG SUP & DEVICES STERILE SUPPLY

## 2021-10-21 PROCEDURE — D9220A PRA ANESTHESIA: ICD-10-PCS | Mod: ,,,

## 2021-10-21 PROCEDURE — 88305 SURGICAL PATHOLOGY: ICD-10-PCS | Mod: 26,,, | Performed by: PATHOLOGY

## 2021-10-21 PROCEDURE — D9220A PRA ANESTHESIA: Mod: ,,,

## 2021-10-21 PROCEDURE — 43239 EGD BIOPSY SINGLE/MULTIPLE: CPT

## 2021-10-21 PROCEDURE — 88342 SURGICAL PATHOLOGY: ICD-10-PCS | Mod: 26,,, | Performed by: PATHOLOGY

## 2021-10-21 PROCEDURE — 63600175 PHARM REV CODE 636 W HCPCS

## 2021-10-21 PROCEDURE — 88305 TISSUE EXAM BY PATHOLOGIST: CPT | Mod: SUR | Performed by: INTERNAL MEDICINE

## 2021-10-21 PROCEDURE — 88305 TISSUE EXAM BY PATHOLOGIST: CPT | Mod: 26,,, | Performed by: PATHOLOGY

## 2021-10-21 PROCEDURE — 37000008 HC ANESTHESIA 1ST 15 MINUTES

## 2021-10-21 PROCEDURE — C1889 IMPLANT/INSERT DEVICE, NOC: HCPCS

## 2021-10-21 PROCEDURE — 25000003 PHARM REV CODE 250

## 2021-10-21 PROCEDURE — 88342 IMHCHEM/IMCYTCHM 1ST ANTB: CPT | Mod: 26,,, | Performed by: PATHOLOGY

## 2021-10-21 RX ORDER — PANTOPRAZOLE SODIUM 40 MG/1
40 TABLET, DELAYED RELEASE ORAL 2 TIMES DAILY
Qty: 60 TABLET | Refills: 1 | Status: SHIPPED | OUTPATIENT
Start: 2021-10-21 | End: 2022-05-24 | Stop reason: SDUPTHER

## 2021-10-21 RX ORDER — PROPOFOL 10 MG/ML
VIAL (ML) INTRAVENOUS
Status: DISCONTINUED | OUTPATIENT
Start: 2021-10-21 | End: 2021-10-21

## 2021-10-21 RX ORDER — SODIUM CHLORIDE 0.9 % (FLUSH) 0.9 %
10 SYRINGE (ML) INJECTION
Status: DISCONTINUED | OUTPATIENT
Start: 2021-10-21 | End: 2021-10-22 | Stop reason: HOSPADM

## 2021-10-21 RX ORDER — LIDOCAINE HYDROCHLORIDE 20 MG/ML
INJECTION, SOLUTION EPIDURAL; INFILTRATION; INTRACAUDAL; PERINEURAL
Status: DISCONTINUED | OUTPATIENT
Start: 2021-10-21 | End: 2021-10-21

## 2021-10-21 RX ADMIN — PROPOFOL 40 MG: 10 INJECTION, EMULSION INTRAVENOUS at 08:10

## 2021-10-21 RX ADMIN — PROPOFOL 80 MG: 10 INJECTION, EMULSION INTRAVENOUS at 08:10

## 2021-10-21 RX ADMIN — LIDOCAINE HYDROCHLORIDE 100 MG: 20 INJECTION, SOLUTION EPIDURAL; INFILTRATION; INTRACAUDAL; PERINEURAL at 08:10

## 2021-10-22 LAB
ESTROGEN SERPL-MCNC: NORMAL PG/ML
LAB AP GROSS DESCRIPTION: NORMAL
LAB AP LABORATORY NOTES: NORMAL
T3RU NFR SERPL: NORMAL %

## 2021-11-18 ENCOUNTER — TELEPHONE (OUTPATIENT)
Dept: GASTROENTEROLOGY | Facility: CLINIC | Age: 56
End: 2021-11-18
Payer: COMMERCIAL

## 2021-11-18 DIAGNOSIS — R25.2 MUSCLE CRAMPS AT NIGHT: Primary | ICD-10-CM

## 2021-12-02 ENCOUNTER — LAB VISIT (OUTPATIENT)
Dept: PRIMARY CARE CLINIC | Facility: CLINIC | Age: 56
End: 2021-12-02

## 2021-12-02 ENCOUNTER — TELEPHONE (OUTPATIENT)
Dept: GASTROENTEROLOGY | Facility: CLINIC | Age: 56
End: 2021-12-02
Payer: COMMERCIAL

## 2021-12-02 DIAGNOSIS — Z02.83 ENCOUNTER FOR DRUG SCREENING: Primary | ICD-10-CM

## 2021-12-02 PROCEDURE — 99000 PR URINE DRUG SCREEN COLLECTION: ICD-10-PCS | Mod: ,,, | Performed by: NURSE PRACTITIONER

## 2021-12-02 PROCEDURE — 99000 SPECIMEN HANDLING OFFICE-LAB: CPT | Mod: ,,, | Performed by: NURSE PRACTITIONER

## 2022-01-14 ENCOUNTER — OFFICE VISIT (OUTPATIENT)
Dept: FAMILY MEDICINE | Facility: CLINIC | Age: 57
End: 2022-01-14
Payer: COMMERCIAL

## 2022-01-14 VITALS
TEMPERATURE: 96 F | HEIGHT: 64 IN | SYSTOLIC BLOOD PRESSURE: 110 MMHG | WEIGHT: 262 LBS | HEART RATE: 65 BPM | BODY MASS INDEX: 44.73 KG/M2 | DIASTOLIC BLOOD PRESSURE: 70 MMHG | OXYGEN SATURATION: 97 % | RESPIRATION RATE: 18 BRPM

## 2022-01-14 DIAGNOSIS — J01.00 ACUTE NON-RECURRENT MAXILLARY SINUSITIS: ICD-10-CM

## 2022-01-14 DIAGNOSIS — I10 HYPERTENSION, UNSPECIFIED TYPE: ICD-10-CM

## 2022-01-14 DIAGNOSIS — R05.1 ACUTE COUGH: Primary | ICD-10-CM

## 2022-01-14 PROCEDURE — 3078F DIAST BP <80 MM HG: CPT | Mod: CPTII,,, | Performed by: NURSE PRACTITIONER

## 2022-01-14 PROCEDURE — 3074F SYST BP LT 130 MM HG: CPT | Mod: CPTII,,, | Performed by: NURSE PRACTITIONER

## 2022-01-14 PROCEDURE — 1160F PR REVIEW ALL MEDS BY PRESCRIBER/CLIN PHARMACIST DOCUMENTED: ICD-10-PCS | Mod: CPTII,,, | Performed by: NURSE PRACTITIONER

## 2022-01-14 PROCEDURE — 96372 PR INJECTION,THERAP/PROPH/DIAG2ST, IM OR SUBCUT: ICD-10-PCS | Mod: ,,, | Performed by: NURSE PRACTITIONER

## 2022-01-14 PROCEDURE — 1159F PR MEDICATION LIST DOCUMENTED IN MEDICAL RECORD: ICD-10-PCS | Mod: CPTII,,, | Performed by: NURSE PRACTITIONER

## 2022-01-14 PROCEDURE — 87428 SARSCOV & INF VIR A&B AG IA: CPT | Mod: QW,,, | Performed by: NURSE PRACTITIONER

## 2022-01-14 PROCEDURE — 3008F PR BODY MASS INDEX (BMI) DOCUMENTED: ICD-10-PCS | Mod: CPTII,,, | Performed by: NURSE PRACTITIONER

## 2022-01-14 PROCEDURE — 3008F BODY MASS INDEX DOCD: CPT | Mod: CPTII,,, | Performed by: NURSE PRACTITIONER

## 2022-01-14 PROCEDURE — 99213 PR OFFICE/OUTPT VISIT, EST, LEVL III, 20-29 MIN: ICD-10-PCS | Mod: 25,,, | Performed by: NURSE PRACTITIONER

## 2022-01-14 PROCEDURE — 4010F PR ACE/ARB THEARPY RXD/TAKEN: ICD-10-PCS | Mod: CPTII,,, | Performed by: NURSE PRACTITIONER

## 2022-01-14 PROCEDURE — 4010F ACE/ARB THERAPY RXD/TAKEN: CPT | Mod: CPTII,,, | Performed by: NURSE PRACTITIONER

## 2022-01-14 PROCEDURE — 3074F PR MOST RECENT SYSTOLIC BLOOD PRESSURE < 130 MM HG: ICD-10-PCS | Mod: CPTII,,, | Performed by: NURSE PRACTITIONER

## 2022-01-14 PROCEDURE — 87428 POCT SARS-COV2 (COVID) WITH FLU ANTIGEN: ICD-10-PCS | Mod: QW,,, | Performed by: NURSE PRACTITIONER

## 2022-01-14 PROCEDURE — 1159F MED LIST DOCD IN RCRD: CPT | Mod: CPTII,,, | Performed by: NURSE PRACTITIONER

## 2022-01-14 PROCEDURE — 1160F RVW MEDS BY RX/DR IN RCRD: CPT | Mod: CPTII,,, | Performed by: NURSE PRACTITIONER

## 2022-01-14 PROCEDURE — 99213 OFFICE O/P EST LOW 20 MIN: CPT | Mod: 25,,, | Performed by: NURSE PRACTITIONER

## 2022-01-14 PROCEDURE — 3078F PR MOST RECENT DIASTOLIC BLOOD PRESSURE < 80 MM HG: ICD-10-PCS | Mod: CPTII,,, | Performed by: NURSE PRACTITIONER

## 2022-01-14 PROCEDURE — 96372 THER/PROPH/DIAG INJ SC/IM: CPT | Mod: ,,, | Performed by: NURSE PRACTITIONER

## 2022-01-14 RX ORDER — METHYLPREDNISOLONE ACETATE 40 MG/ML
40 INJECTION, SUSPENSION INTRA-ARTICULAR; INTRALESIONAL; INTRAMUSCULAR; SOFT TISSUE ONCE
Status: DISCONTINUED | OUTPATIENT
Start: 2022-01-14 | End: 2022-01-14

## 2022-01-14 RX ORDER — LISINOPRIL 5 MG/1
5 TABLET ORAL DAILY
Qty: 30 TABLET | Refills: 5 | Status: SHIPPED | OUTPATIENT
Start: 2022-01-14 | End: 2022-03-23 | Stop reason: SDUPTHER

## 2022-01-14 RX ORDER — METHYLPREDNISOLONE ACETATE 40 MG/ML
40 INJECTION, SUSPENSION INTRA-ARTICULAR; INTRALESIONAL; INTRAMUSCULAR; SOFT TISSUE
Status: COMPLETED | OUTPATIENT
Start: 2022-01-14 | End: 2022-01-14

## 2022-01-14 RX ORDER — LISINOPRIL 2.5 MG/1
2.5 TABLET ORAL 2 TIMES DAILY
COMMUNITY
Start: 2021-12-16 | End: 2022-01-14 | Stop reason: SDUPTHER

## 2022-01-14 RX ORDER — LISINOPRIL 2.5 MG/1
2.5 TABLET ORAL 2 TIMES DAILY
Qty: 60 TABLET | Refills: 1 | Status: SHIPPED | OUTPATIENT
Start: 2022-01-14 | End: 2022-03-23

## 2022-01-14 RX ORDER — AZITHROMYCIN 250 MG/1
TABLET, FILM COATED ORAL
Qty: 6 TABLET | Refills: 0 | Status: SHIPPED | OUTPATIENT
Start: 2022-01-14 | End: 2022-01-19

## 2022-01-14 RX ORDER — CEFTRIAXONE 1 G/1
1 INJECTION, POWDER, FOR SOLUTION INTRAMUSCULAR; INTRAVENOUS ONCE
Status: COMPLETED | OUTPATIENT
Start: 2022-01-14 | End: 2022-01-14

## 2022-01-14 RX ADMIN — CEFTRIAXONE 1 G: 1 INJECTION, POWDER, FOR SOLUTION INTRAMUSCULAR; INTRAVENOUS at 09:01

## 2022-01-14 RX ADMIN — METHYLPREDNISOLONE ACETATE 40 MG: 40 INJECTION, SUSPENSION INTRA-ARTICULAR; INTRALESIONAL; INTRAMUSCULAR; SOFT TISSUE at 09:01

## 2022-01-14 NOTE — LETTER
January 14, 2022      [facility]  [address]  [place] [address]  Phone: [phone]  Fax: [phone]       Patient: Mally Woods   YOB: 1965  Date of Visit: 01/14/2022    To Whom It May Concern:    Moises Woods  was at [facility] on 01/14/2022. The patient may return to work/school on 01/17/2022 with no restrictions. If you have any questions or concerns, or if I can be of further assistance, please do not hesitate to contact me.    Sincerely,    Danica Brady RN

## 2022-01-14 NOTE — PROGRESS NOTES
MARY Ray   [place] [address]  [place]  [address]      PATIENT NAME: Mally Woods  : 1965  DATE: 22  MRN: [id number]      Billing Provider: MARY Ray  Level of Service:   Patient PCP Information     Provider PCP Type    Primary Doctor No General          Reason for Visit / Chief Complaint: Sore Throat (Denies fever, n/v/d or loss of smell or taste.  Got sick 2 days ago.), Cough (Productive cough unsure of color of sputum.  Worse at night. ), and Nasal Congestion (Sneezing and runny nose also.)       Update PCP  Update Chief Complaint         History of Present Illness / Problem Focused Workflow     Mally Woods presents to the clinic with Sore Throat (Denies fever, n/v/d or loss of smell or taste.  Got sick 2 days ago.), Cough (Productive cough unsure of color of sputum.  Worse at night. ), and Nasal Congestion (Sneezing and runny nose also.)     Patient presents to clinic with c/o cough, congestion, drainage x 4 days.       Review of Systems     @Review of Systems   HENT: Positive for nasal congestion, postnasal drip, rhinorrhea and sinus pressure/congestion. Negative for ear pain.    Respiratory: Negative for cough.    Cardiovascular: Negative for chest pain.   Neurological: Positive for headaches.       Medical / Social / Family History     Past Medical History:   Diagnosis Date    Acute superficial gastritis without hemorrhage 10/21/2021    Anxiety     Aortic aneurysm 2020    w/o rupture    Cervical disc disorder     Chest pain in adult 10/21/2021    Depression     GERD (gastroesophageal reflux disease)     History of transient ischemic attack (TIA) 2020    Hyperlipidemia     Hypertension     Stroke     Thoracic aortic aneurysm     Vitamin deficiency        Past Surgical History:   Procedure Laterality Date    CARPAL TUNNEL RELEASE  2017    Dr. Moran with cyst removed from left middle finger    CHOLECYSTECTOMY          Social History  Ms.  reports that she has never smoked. She has never used smokeless tobacco. She reports that she does not drink alcohol and does not use drugs.    Family History  MsRayo's family history includes Cervical cancer in her mother; Dementia in her father; Diabetes in her mother; Glaucoma in her mother; Hyperlipidemia in her mother; Hypertension in her father and mother; Pancreatic cancer in her mother.    Medications and Allergies     Medications  Outpatient Medications Marked as Taking for the 1/14/22 encounter (Office Visit) with MARY Ray   Medication Sig Dispense Refill    ergocalciferol (ERGOCALCIFEROL) 50,000 unit Cap Take 1 Softgel BY MOUTH TWICE WEEKLY AS DIRECTED      mecobalamin, vitamin B12, 1,000 mcg TbDL 1 tablet under the tongue and allow to dissolve Once a day Sublingual 30 days      pantoprazole (PROTONIX) 40 MG tablet Take 1 tablet (40 mg total) by mouth 2 (two) times daily. 60 tablet 1    [DISCONTINUED] lisinopriL (PRINIVIL,ZESTRIL) 2.5 MG tablet Take 2.5 mg by mouth 2 (two) times daily.      [DISCONTINUED] lisinopriL (PRINIVIL,ZESTRIL) 5 MG tablet Take 1 tablet (5 mg total) by mouth once daily. (Patient taking differently: Take 10 mg by mouth once daily.) 30 tablet 5     Current Facility-Administered Medications for the 1/14/22 encounter (Office Visit) with MARY Ray   Medication Dose Route Frequency Provider Last Rate Last Admin    cefTRIAXone injection 1 g  1 g Intramuscular Once MARY Ray        methylPREDNISolone acetate injection 40 mg  40 mg Intra-articular Once MARY Ray           Allergies  Review of patient's allergies indicates:   Allergen Reactions    Betamethasone Other (See Comments)     Flushing    Fentanyl Other (See Comments)     Extreme sedation    Hydrochlorothiazide Other (See Comments)     Reaction unknown    Penicillins Hives     Can take rocephin    Promethazine hcl Other (See Comments)     Hallucinations    Sulfa  (sulfonamide antibiotics) Itching    Hydromorphone hcl Palpitations    Latex Rash    Morphine Palpitations       Physical Examination     Vitals:    01/14/22 0815   BP: 110/70   Pulse: 65   Resp: 18   Temp: 96.2 °F (35.7 °C)     Physical Exam  Constitutional:       General: She is not in acute distress.     Appearance: Normal appearance.   HENT:      Right Ear: Tympanic membrane is erythematous and bulging.      Left Ear: Tympanic membrane is bulging.      Mouth/Throat:      Pharynx: Posterior oropharyngeal erythema present.   Cardiovascular:      Rate and Rhythm: Normal rate and regular rhythm.   Pulmonary:      Effort: Pulmonary effort is normal. No respiratory distress.      Breath sounds: Normal breath sounds. No wheezing, rhonchi or rales.   Skin:     General: Skin is warm and dry.   Neurological:      Mental Status: She is alert.   Psychiatric:         Mood and Affect: Mood normal.         Behavior: Behavior normal.               Lab Results   Component Value Date    WBC 7.52 11/19/2021    HGB 12.6 11/19/2021    HCT 38.0 11/19/2021    MCV 90.9 11/19/2021     11/19/2021          Sodium   Date Value Ref Range Status   11/19/2021 142 136 - 145 mmol/L Final     Potassium   Date Value Ref Range Status   11/19/2021 4.0 3.5 - 5.1 mmol/L Final     Chloride   Date Value Ref Range Status   11/19/2021 107 98 - 107 mmol/L Final     CO2   Date Value Ref Range Status   11/19/2021 30 21 - 32 mmol/L Final     Glucose   Date Value Ref Range Status   11/19/2021 96 74 - 106 mg/dL Final     BUN   Date Value Ref Range Status   11/19/2021 10 7 - 18 mg/dL Final     Creatinine   Date Value Ref Range Status   11/19/2021 0.77 0.55 - 1.02 mg/dL Final     Calcium   Date Value Ref Range Status   11/19/2021 9.0 8.5 - 10.1 mg/dL Final     Total Protein   Date Value Ref Range Status   11/19/2021 7.4 6.4 - 8.2 g/dL Final     Albumin   Date Value Ref Range Status   11/19/2021 3.6 3.5 - 5.0 g/dL Final     Bilirubin, Total   Date Value  Ref Range Status   11/19/2021 0.3 0.0 - 1.2 mg/dL Final     Alk Phos   Date Value Ref Range Status   11/19/2021 103 46 - 118 U/L Final     AST   Date Value Ref Range Status   11/19/2021 21 15 - 37 U/L Final     ALT   Date Value Ref Range Status   11/19/2021 45 13 - 56 U/L Final     Anion Gap   Date Value Ref Range Status   11/19/2021 9 7 - 16 mmol/L Final     eGFR    Date Value Ref Range Status   03/23/2021 85  Final     eGFR   Date Value Ref Range Status   11/19/2021 82 >=60 mL/min/1.73m² Final      EGD w Dilation  Narrative: Procedure Date  10/21/21    Impression  Overall   Impression:  Mucosa of the esophagus is normal with z-line at 38cm,   biopsies were obtained. Mucosa of the stomach shows non-erosive gastritis,   biopsies were obtained. Mucosa of the duodenum is normal    Recommendation  Await pathology results; avoid nsaids; ppi 1/ bid x 8 weeks, then daily    Indication  Gerd, chest pain    Providers  Attending: Florentin Lomax MD  Fellow:    Other Staff: Lucila Barrett RN, Christian Schmitt CRNA, Ynes Gan, Patient Care Assistant    Medications  Moderate sedation administered by anesthesia staff - See anesthesia   record.    Preprocedure  A history and physical has been performed, and patient medication   allergies have been reviewed. The patient's tolerance of previous   anesthesia has been reviewed. The risks and benefits of the procedure and   the sedation options and risks were discussed with the patient. All   questions were answered and informed consent obtained.    ASA Score: ASA 2 - Patient with mild systemic disease with no functional   limitations  Mallampati Airway Score: II (hard and soft palate, upper portion of   tonsils anduvula visible)    Details of the Procedure  The patient underwent monitored anesthesia care, which was administered by   an anesthesia professional. The patient's blood pressure, heart rate,   level of consciousness, oxygen,  respirations, ECG and ETCO2 were monitored   throughout the procedure. The scope was introduced through the mouth and   advanced to the third part of the duodenum. Retroflexion was performed in   the cardia. Prior to the procedure, the patient's H. Pylori status was   unknown. The patient's estimated blood loss was minimal (<5 mL). The   procedure was not difficult. The patient tolerated the procedure well.   There were no apparent complications.     Scope: Gastroscope  Scope Serial: [id number]    Events  Procedure Events   Event Event Time     Procedure Events   Event Event Time   SCOPE IN 10/21/2021  8:25 AM   SCOPE OUT 10/21/2021  8:29 AM     Findings  Erythematous mucosa in the fundus of the stomach and antrum; performed   cold forceps biopsy     Procedures   Assessment and Plan (including Health Maintenance)      Problem List  Smart Sets  Document Outside HM   :    Plan:           Problem List Items Addressed This Visit    None     Visit Diagnoses     Acute cough    -  Primary    Relevant Orders    POCT SARS-COV2 (COVID) with Flu Antigen (Completed)    Hypertension, unspecified type        Relevant Medications    lisinopriL (PRINIVIL,ZESTRIL) 2.5 MG tablet    lisinopriL (PRINIVIL,ZESTRIL) 5 MG tablet    Acute non-recurrent maxillary sinusitis        Relevant Medications    azithromycin (Z-VASYL) 250 MG tablet    methylPREDNISolone acetate injection 40 mg (Start on 1/14/2022 10:15 AM)    cefTRIAXone injection 1 g (Start on 1/14/2022 10:15 AM)          The patient has no Health Maintenance topics of status Not Due    No future appointments.     Health Maintenance Due   Topic Date Due    Hepatitis C Screening  Never done    Lipid Panel  Never done    COVID-19 Vaccine (1) Never done    Pneumococcal Vaccines (Age 0-64) (1 of 2 - PPSV23) Never done    HIV Screening  Never done    TETANUS VACCINE  Never done    Cervical Cancer Screening  Never done    Colorectal Cancer Screening  Never done    Shingles Vaccine (1  of 2) Never done    Influenza Vaccine (1) Never done    Mammogram  02/24/2022        Follow up if symptoms worsen or fail to improve.     Signature:  Yvette Cartwright [address]  [address]    Date of encounter: 1/14/22

## 2022-01-17 ENCOUNTER — TELEPHONE (OUTPATIENT)
Dept: FAMILY MEDICINE | Facility: CLINIC | Age: 57
End: 2022-01-17
Payer: COMMERCIAL

## 2022-01-17 DIAGNOSIS — J40 BRONCHITIS: Primary | ICD-10-CM

## 2022-01-17 RX ORDER — METHYLPREDNISOLONE 4 MG/1
TABLET ORAL
Qty: 21 EACH | Refills: 0 | Status: SHIPPED | OUTPATIENT
Start: 2022-01-17 | End: 2022-02-07

## 2022-01-17 NOTE — TELEPHONE ENCOUNTER
Patient tested positive for covid yesterday and needs note for work. Spoke with MARY Hayes and stated we could give patient a work note.

## 2022-01-17 NOTE — LETTER
January 17, 2022      [facility]  [address]  [place] [address]  Phone: [phone]  Fax: [phone]       Patient: Mally Woods   YOB: 1965  Date of Visit: 01/14/2022    To Whom It May Concern:    Moises Woods  was at [facility] on 01/14/2022. The patient may return to work/school on 01/20/2022 with no restrictions. If you have any questions or concerns, or if I can be of further assistance, please do not hesitate to contact me.    Sincerely,    Haydee Hawkins LPN

## 2022-01-17 NOTE — LETTER
January 17, 2022      [facility]  [address]  [place] [address]  Phone: [phone]  Fax: [phone]       Patient: Mlaly Woods   YOB: 1965  Date of Visit: 01/14/2022    To Whom It May Concern:    Moises Woods  was at [facility] on 01/14/2022. The patient may return to work/school on 01/22/2022 with no restrictions. If you have any questions or concerns, or if I can be of further assistance, please do not hesitate to contact me.Patient was positive for Covid and is having symptoms.    Sincerely,    Haydee Hawkins LPN

## 2022-01-21 RX ORDER — ONDANSETRON 4 MG/1
4 TABLET, ORALLY DISINTEGRATING ORAL EVERY 8 HOURS PRN
Qty: 21 TABLET | Refills: 0 | Status: SHIPPED | OUTPATIENT
Start: 2022-01-21 | End: 2022-03-24 | Stop reason: SDUPTHER

## 2022-02-18 ENCOUNTER — OFFICE VISIT (OUTPATIENT)
Dept: FAMILY MEDICINE | Facility: CLINIC | Age: 57
End: 2022-02-18
Payer: COMMERCIAL

## 2022-02-18 VITALS
HEART RATE: 70 BPM | BODY MASS INDEX: 43.5 KG/M2 | DIASTOLIC BLOOD PRESSURE: 72 MMHG | SYSTOLIC BLOOD PRESSURE: 118 MMHG | OXYGEN SATURATION: 99 % | TEMPERATURE: 97 F | RESPIRATION RATE: 18 BRPM | HEIGHT: 64 IN | WEIGHT: 254.81 LBS

## 2022-02-18 DIAGNOSIS — E53.8 VITAMIN B12 DEFICIENCY: ICD-10-CM

## 2022-02-18 DIAGNOSIS — Z86.16 HISTORY OF COVID-19: ICD-10-CM

## 2022-02-18 DIAGNOSIS — E55.9 VITAMIN D DEFICIENCY: ICD-10-CM

## 2022-02-18 DIAGNOSIS — R05.9 COUGH: Primary | ICD-10-CM

## 2022-02-18 PROCEDURE — 1160F RVW MEDS BY RX/DR IN RCRD: CPT | Mod: CPTII,,, | Performed by: NURSE PRACTITIONER

## 2022-02-18 PROCEDURE — 1159F MED LIST DOCD IN RCRD: CPT | Mod: CPTII,,, | Performed by: NURSE PRACTITIONER

## 2022-02-18 PROCEDURE — 4010F ACE/ARB THERAPY RXD/TAKEN: CPT | Mod: CPTII,,, | Performed by: NURSE PRACTITIONER

## 2022-02-18 PROCEDURE — 3078F DIAST BP <80 MM HG: CPT | Mod: CPTII,,, | Performed by: NURSE PRACTITIONER

## 2022-02-18 PROCEDURE — 99213 OFFICE O/P EST LOW 20 MIN: CPT | Mod: ,,, | Performed by: NURSE PRACTITIONER

## 2022-02-18 PROCEDURE — 1160F PR REVIEW ALL MEDS BY PRESCRIBER/CLIN PHARMACIST DOCUMENTED: ICD-10-PCS | Mod: CPTII,,, | Performed by: NURSE PRACTITIONER

## 2022-02-18 PROCEDURE — 3008F BODY MASS INDEX DOCD: CPT | Mod: CPTII,,, | Performed by: NURSE PRACTITIONER

## 2022-02-18 PROCEDURE — 3008F PR BODY MASS INDEX (BMI) DOCUMENTED: ICD-10-PCS | Mod: CPTII,,, | Performed by: NURSE PRACTITIONER

## 2022-02-18 PROCEDURE — 99213 PR OFFICE/OUTPT VISIT, EST, LEVL III, 20-29 MIN: ICD-10-PCS | Mod: ,,, | Performed by: NURSE PRACTITIONER

## 2022-02-18 PROCEDURE — 1159F PR MEDICATION LIST DOCUMENTED IN MEDICAL RECORD: ICD-10-PCS | Mod: CPTII,,, | Performed by: NURSE PRACTITIONER

## 2022-02-18 PROCEDURE — 3078F PR MOST RECENT DIASTOLIC BLOOD PRESSURE < 80 MM HG: ICD-10-PCS | Mod: CPTII,,, | Performed by: NURSE PRACTITIONER

## 2022-02-18 PROCEDURE — 4010F PR ACE/ARB THEARPY RXD/TAKEN: ICD-10-PCS | Mod: CPTII,,, | Performed by: NURSE PRACTITIONER

## 2022-02-18 PROCEDURE — 3074F PR MOST RECENT SYSTOLIC BLOOD PRESSURE < 130 MM HG: ICD-10-PCS | Mod: CPTII,,, | Performed by: NURSE PRACTITIONER

## 2022-02-18 PROCEDURE — 3074F SYST BP LT 130 MM HG: CPT | Mod: CPTII,,, | Performed by: NURSE PRACTITIONER

## 2022-02-18 RX ORDER — ERGOCALCIFEROL 1.25 MG/1
CAPSULE ORAL
Qty: 14 CAPSULE | Refills: 0 | Status: SHIPPED | OUTPATIENT
Start: 2022-02-18 | End: 2022-08-22 | Stop reason: SDUPTHER

## 2022-02-18 RX ORDER — CHOLECALCIFEROL (VITAMIN D3) 125 MCG
CAPSULE ORAL
Qty: 30 TABLET | Refills: 5 | Status: SHIPPED | OUTPATIENT
Start: 2022-02-18 | End: 2022-08-22 | Stop reason: SDUPTHER

## 2022-02-18 NOTE — PROGRESS NOTES
"   MARY Mancilla   [facility]  [address]  [place] [address]     PATIENT NAME: Mally Woods  : 1965  DATE: 22  MRN: [id number]      Billing Provider: MARY Mancilla  Level of Service:   Patient PCP Information     Provider PCP Type    MARY Mancilla General          Reason for Visit / Chief Complaint: Cough ("productive cough and sob and chest tightness") and Fatigue       Update PCP  Update Chief Complaint         History of Present Illness / Problem Focused Workflow     Mally Woods presents to the clinic c/o occasional productive cough, slight SOB and soreness and fatigue since she had covid over a month ago.   Hx of HTN, costochondritis, obesity      Review of Systems     Review of Systems   Constitutional: Negative.  Negative for activity change, appetite change, chills and fatigue.   HENT: Positive for postnasal drip. Negative for ear pain, rhinorrhea, sinus pressure/congestion, sneezing and sore throat.    Respiratory: Positive for cough and chest tightness (with cough). Negative for shortness of breath.    Cardiovascular: Negative for chest pain, palpitations and leg swelling.   Gastrointestinal: Negative for abdominal pain, change in bowel habit, nausea, vomiting and change in bowel habit.   Neurological: Negative for weakness and headaches.        Medical / Social / Family History     Past Medical History:   Diagnosis Date    Acute superficial gastritis without hemorrhage 10/21/2021    Anxiety     Aortic aneurysm 2020    w/o rupture    Cervical disc disorder     Chest pain in adult 10/21/2021    Depression     GERD (gastroesophageal reflux disease)     History of transient ischemic attack (TIA) 2020    Hyperlipidemia     Hypertension     Stroke     Thoracic aortic aneurysm     Vitamin deficiency        Past Surgical History:   Procedure Laterality Date    CARPAL TUNNEL RELEASE  2017    Dr. Moran with cyst removed from " "left middle finger    CHOLECYSTECTOMY         Social History  Ms.  reports that she has never smoked. She has never used smokeless tobacco. She reports that she does not drink alcohol and does not use drugs.    Family History  Ms.'s family history includes Cervical cancer in her mother; Dementia in her father; Diabetes in her mother; Glaucoma in her mother; Hyperlipidemia in her mother; Hypertension in her father and mother; Pancreatic cancer in her mother.    Medications and Allergies     Medications  Outpatient Medications Marked as Taking for the 2/18/22 encounter (Office Visit) with MARY Broderick   Medication Sig Dispense Refill    lisinopriL (PRINIVIL,ZESTRIL) 2.5 MG tablet Take 1 tablet (2.5 mg total) by mouth 2 (two) times daily. 60 tablet 1    ondansetron (ZOFRAN-ODT) 4 MG TbDL Take 1 tablet (4 mg total) by mouth every 8 (eight) hours as needed (nausea). 21 tablet 0    pantoprazole (PROTONIX) 40 MG tablet Take 1 tablet (40 mg total) by mouth 2 (two) times daily. 60 tablet 1    [DISCONTINUED] ergocalciferol (ERGOCALCIFEROL) 50,000 unit Cap Take 1 Softgel BY MOUTH TWICE WEEKLY AS DIRECTED      [DISCONTINUED] mecobalamin, vitamin B12, 1,000 mcg TbDL 1 tablet under the tongue and allow to dissolve Once a day Sublingual 30 days         Allergies  Review of patient's allergies indicates:   Allergen Reactions    Betamethasone Other (See Comments)     Flushing    Fentanyl Other (See Comments)     Extreme sedation    Hydrochlorothiazide Other (See Comments)     Reaction unknown    Penicillins Hives     Can take rocephin    Promethazine hcl Other (See Comments)     Hallucinations    Sulfa (sulfonamide antibiotics) Itching    Hydromorphone hcl Palpitations    Latex Rash    Morphine Palpitations       Physical Examination     Vitals:    02/18/22 1421   BP: 118/72   Pulse: 70   Resp: 18   Temp: 96.7 °F (35.9 °C)   TempSrc: Temporal   SpO2: 99%   Weight: 115.6 kg (254 lb 12.8 oz)   Height: 5' 4" " (1.626 m)      Physical Exam  Constitutional:       General: She is not in acute distress.     Appearance: Normal appearance.   HENT:      Right Ear: Tympanic membrane normal.      Left Ear: Tympanic membrane normal.      Nose: Nose normal. No congestion or rhinorrhea.      Mouth/Throat:      Mouth: Mucous membranes are moist.      Pharynx: No pharyngeal swelling or posterior oropharyngeal erythema.      Comments: Mild cobblestoning posterior pharynx  Eyes:      Pupils: Pupils are equal, round, and reactive to light.   Cardiovascular:      Rate and Rhythm: Normal rate and regular rhythm.      Heart sounds: Normal heart sounds.   Pulmonary:      Effort: Pulmonary effort is normal. No tachypnea, accessory muscle usage or respiratory distress.      Breath sounds: Normal breath sounds. No wheezing, rhonchi or rales.   Abdominal:      Palpations: Abdomen is soft.   Musculoskeletal:      Cervical back: Neck supple.      Right lower leg: No edema.      Left lower leg: No edema.   Lymphadenopathy:      Cervical: No cervical adenopathy.   Skin:     General: Skin is warm and dry.      Coloration: Skin is not pale.   Neurological:      Mental Status: She is alert and oriented to person, place, and time.          Assessment and Plan (including Health Maintenance)      Problem List  Smart Sets  Document Outside HM   :      Health Maintenance Due   Topic Date Due    Hepatitis C Screening  Never done    Lipid Panel  Never done    COVID-19 Vaccine (1) Never done    Pneumococcal Vaccines (Age 0-64) (1 of 2 - PPSV23) Never done    HIV Screening  Never done    TETANUS VACCINE  Never done    Cervical Cancer Screening  Never done    Colorectal Cancer Screening  Never done    Shingles Vaccine (1 of 2) Never done    Influenza Vaccine (1) Never done    Mammogram  02/24/2022       Problem List Items Addressed This Visit    None     Visit Diagnoses     Cough    -  Primary    Plain chest film obtained and sent to radiology. Sample  of Breztri given to pt to try. Explained it will take several weeks for cough to resolve. Increase fluid    Relevant Orders    X-Ray Chest PA And Lateral (Completed)    Vitamin D deficiency        Relevant Medications    ergocalciferol (ERGOCALCIFEROL) 50,000 unit Cap    Vitamin B12 deficiency        Relevant Medications    mecobalamin, vitamin B12, 1,000 mcg TbDL    History of COVID-19            Cough  Comments:  Plain chest film obtained and sent to radiology. Sample of Breztri given to pt to try. Explained it will take several weeks for cough to resolve. Increase fluid  Orders:  -     X-Ray Chest PA And Lateral; Future; Expected date: 02/18/2022    Vitamin D deficiency  -     ergocalciferol (ERGOCALCIFEROL) 50,000 unit Cap; Take 1 Softgel BY MOUTH TWICE WEEKLY AS DIRECTED  Dispense: 14 capsule; Refill: 0    Vitamin B12 deficiency  -     mecobalamin, vitamin B12, 1,000 mcg TbDL; 1 tablet under the tongue and allow to dissolve Once a day Sublingual 30 days  Dispense: 30 tablet; Refill: 5    History of COVID-19       The patient has no Health Maintenance topics of status Not Due    Procedures     Future Appointments   Date Time Provider Department Center   3/24/2022  3:40 PM [facility] M[facility]        Follow up in about 1 month (around 3/18/2022), or if symptoms worsen or fail to improve.     Signature:  MARY Mancilla    Date of encounter: 2/18/22

## 2022-03-23 ENCOUNTER — TELEPHONE (OUTPATIENT)
Dept: GASTROENTEROLOGY | Facility: CLINIC | Age: 57
End: 2022-03-23
Payer: COMMERCIAL

## 2022-03-23 DIAGNOSIS — I10 HYPERTENSION, UNSPECIFIED TYPE: ICD-10-CM

## 2022-03-23 DIAGNOSIS — R19.7 DIARRHEA, UNSPECIFIED TYPE: Primary | ICD-10-CM

## 2022-03-23 RX ORDER — LISINOPRIL 2.5 MG/1
TABLET ORAL
Qty: 60 TABLET | Refills: 5 | Status: SHIPPED | OUTPATIENT
Start: 2022-03-23 | End: 2022-06-23 | Stop reason: DRUGHIGH

## 2022-03-23 NOTE — TELEPHONE ENCOUNTER
Patient called stating she is having diarrhea that started last Friday. Patient states it does seem to be some better but still occurring. Appointment was scheduled on 04/07/2022 and patient asked if any labs are needed. Discussed with MARY Pulliam and she recommends CBC, CMP, amylase, lipase and stool studies. Instructed patient to go to lab before appointment to have test done. Patient verbalized good understanding.      ----- Message from Yumiko Lara sent at 3/22/2022 12:50 PM CDT -----  Re: diarrhea issues  made appt with [facility] 4/7 but has question about needing lab?  [phone]

## 2022-03-24 ENCOUNTER — HOSPITAL ENCOUNTER (EMERGENCY)
Facility: HOSPITAL | Age: 57
Discharge: HOME | End: 2022-03-25
Payer: COMMERCIAL

## 2022-03-24 ENCOUNTER — HOSPITAL ENCOUNTER (OUTPATIENT)
Dept: RADIOLOGY | Facility: HOSPITAL | Age: 57
Discharge: HOME | End: 2022-03-24
Attending: RADIOLOGY
Payer: COMMERCIAL

## 2022-03-24 VITALS — WEIGHT: 260 LBS | HEIGHT: 65 IN | BODY MASS INDEX: 43.32 KG/M2

## 2022-03-24 VITALS
DIASTOLIC BLOOD PRESSURE: 81 MMHG | BODY MASS INDEX: 44.39 KG/M2 | HEART RATE: 73 BPM | RESPIRATION RATE: 20 BRPM | TEMPERATURE: 98 F | WEIGHT: 260 LBS | HEIGHT: 64 IN | SYSTOLIC BLOOD PRESSURE: 165 MMHG | OXYGEN SATURATION: 98 %

## 2022-03-24 DIAGNOSIS — Z12.31 VISIT FOR SCREENING MAMMOGRAM: ICD-10-CM

## 2022-03-24 DIAGNOSIS — R07.89 CHEST WALL PAIN: ICD-10-CM

## 2022-03-24 DIAGNOSIS — R07.9 CHEST PAIN IN ADULT: ICD-10-CM

## 2022-03-24 DIAGNOSIS — K52.9 GASTROENTERITIS: Primary | ICD-10-CM

## 2022-03-24 DIAGNOSIS — K76.0 FATTY LIVER: ICD-10-CM

## 2022-03-24 DIAGNOSIS — K21.9 GASTROESOPHAGEAL REFLUX DISEASE: ICD-10-CM

## 2022-03-24 DIAGNOSIS — S93.401A SPRAIN OF RIGHT ANKLE: ICD-10-CM

## 2022-03-24 DIAGNOSIS — M25.572 LEFT ANKLE PAIN: ICD-10-CM

## 2022-03-24 DIAGNOSIS — M25.571 ACUTE RIGHT ANKLE PAIN: ICD-10-CM

## 2022-03-24 DIAGNOSIS — K29.00 ACUTE SUPERFICIAL GASTRITIS WITHOUT HEMORRHAGE: ICD-10-CM

## 2022-03-24 LAB
ALBUMIN SERPL BCP-MCNC: 3.9 G/DL (ref 3.5–5)
ALBUMIN/GLOB SERPL: 1.1 {RATIO}
ALP SERPL-CCNC: 104 U/L (ref 46–118)
ALT SERPL W P-5'-P-CCNC: 38 U/L (ref 13–56)
ANION GAP SERPL CALCULATED.3IONS-SCNC: 7 MMOL/L (ref 7–16)
AST SERPL W P-5'-P-CCNC: 20 U/L (ref 15–37)
BASOPHILS # BLD AUTO: 0.04 K/UL (ref 0–0.2)
BASOPHILS NFR BLD AUTO: 0.4 % (ref 0–1)
BILIRUB SERPL-MCNC: 0.3 MG/DL (ref 0–1.2)
BUN SERPL-MCNC: 12 MG/DL (ref 7–18)
BUN/CREAT SERPL: 15 (ref 6–20)
CALCIUM SERPL-MCNC: 9.6 MG/DL (ref 8.5–10.1)
CHLORIDE SERPL-SCNC: 107 MMOL/L (ref 98–107)
CO2 SERPL-SCNC: 28 MMOL/L (ref 21–32)
CREAT SERPL-MCNC: 0.81 MG/DL (ref 0.55–1.02)
DIFFERENTIAL METHOD BLD: ABNORMAL
EOSINOPHIL # BLD AUTO: 0.13 K/UL (ref 0–0.5)
EOSINOPHIL NFR BLD AUTO: 1.4 % (ref 1–4)
ERYTHROCYTE [DISTWIDTH] IN BLOOD BY AUTOMATED COUNT: 12.4 % (ref 11.5–14.5)
GLOBULIN SER-MCNC: 3.5 G/DL (ref 2–4)
GLUCOSE SERPL-MCNC: 93 MG/DL (ref 74–106)
HCT VFR BLD AUTO: 40.2 % (ref 38–47)
HGB BLD-MCNC: 13.5 G/DL (ref 12–16)
IMM GRANULOCYTES # BLD AUTO: 0.02 K/UL (ref 0–0.04)
IMM GRANULOCYTES NFR BLD: 0.2 % (ref 0–0.4)
LYMPHOCYTES # BLD AUTO: 2.34 K/UL (ref 1–4.8)
LYMPHOCYTES NFR BLD AUTO: 25.5 % (ref 27–41)
MAGNESIUM SERPL-MCNC: 2.3 MG/DL (ref 1.7–2.3)
MCH RBC QN AUTO: 30.7 PG (ref 27–31)
MCHC RBC AUTO-ENTMCNC: 33.6 G/DL (ref 32–36)
MCV RBC AUTO: 91.4 FL (ref 80–96)
MONOCYTES # BLD AUTO: 0.69 K/UL (ref 0–0.8)
MONOCYTES NFR BLD AUTO: 7.5 % (ref 2–6)
MPC BLD CALC-MCNC: 11.3 FL (ref 9.4–12.4)
NEUTROPHILS # BLD AUTO: 5.95 K/UL (ref 1.8–7.7)
NEUTROPHILS NFR BLD AUTO: 65 % (ref 53–65)
NRBC # BLD AUTO: 0 X10E3/UL
NRBC, AUTO (.00): 0 %
PLATELET # BLD AUTO: 311 K/UL (ref 150–400)
POTASSIUM SERPL-SCNC: 4 MMOL/L (ref 3.5–5.1)
PROT SERPL-MCNC: 7.4 G/DL (ref 6.4–8.2)
RBC # BLD AUTO: 4.4 M/UL (ref 4.2–5.4)
SODIUM SERPL-SCNC: 138 MMOL/L (ref 136–145)
TROPONIN I SERPL HS-MCNC: <4 PG/ML
WBC # BLD AUTO: 9.17 K/UL (ref 4.5–11)

## 2022-03-24 PROCEDURE — 83735 ASSAY OF MAGNESIUM: CPT | Performed by: NURSE PRACTITIONER

## 2022-03-24 PROCEDURE — 77067 MAMMO DIGITAL SCREENING BILAT: ICD-10-PCS | Mod: 26,,, | Performed by: RADIOLOGY

## 2022-03-24 PROCEDURE — 84484 ASSAY OF TROPONIN QUANT: CPT | Performed by: NURSE PRACTITIONER

## 2022-03-24 PROCEDURE — 99284 EMERGENCY DEPT VISIT MOD MDM: CPT | Mod: ,,, | Performed by: NURSE PRACTITIONER

## 2022-03-24 PROCEDURE — 85025 COMPLETE CBC W/AUTO DIFF WBC: CPT | Performed by: NURSE PRACTITIONER

## 2022-03-24 PROCEDURE — 77067 SCR MAMMO BI INCL CAD: CPT | Mod: TC

## 2022-03-24 PROCEDURE — 81003 URINALYSIS AUTO W/O SCOPE: CPT | Performed by: NURSE PRACTITIONER

## 2022-03-24 PROCEDURE — 80053 COMPREHEN METABOLIC PANEL: CPT | Performed by: NURSE PRACTITIONER

## 2022-03-24 PROCEDURE — 36415 COLL VENOUS BLD VENIPUNCTURE: CPT | Performed by: NURSE PRACTITIONER

## 2022-03-24 PROCEDURE — 77067 SCR MAMMO BI INCL CAD: CPT | Mod: 26,,, | Performed by: RADIOLOGY

## 2022-03-24 PROCEDURE — 99285 EMERGENCY DEPT VISIT HI MDM: CPT

## 2022-03-24 PROCEDURE — 99284 PR EMERGENCY DEPT VISIT,LEVEL IV: ICD-10-PCS | Mod: ,,, | Performed by: NURSE PRACTITIONER

## 2022-03-24 RX ORDER — ONDANSETRON 4 MG/1
4 TABLET, ORALLY DISINTEGRATING ORAL EVERY 8 HOURS PRN
Qty: 21 TABLET | Refills: 0 | Status: SHIPPED | OUTPATIENT
Start: 2022-03-24 | End: 2022-06-14 | Stop reason: SDUPTHER

## 2022-03-25 LAB
BILIRUB UR QL STRIP: NEGATIVE
CLARITY UR: ABNORMAL
COLOR UR: YELLOW
GLUCOSE UR STRIP-MCNC: NEGATIVE MG/DL
KETONES UR STRIP-SCNC: NEGATIVE MG/DL
LEUKOCYTE ESTERASE UR QL STRIP: NEGATIVE
NITRITE UR QL STRIP: NEGATIVE
PH UR STRIP: 6 PH UNITS
PROT UR QL STRIP: NEGATIVE
RBC # UR STRIP: NEGATIVE /UL
SP GR UR STRIP: 1.01
UROBILINOGEN UR STRIP-ACNC: 0.2 MG/DL

## 2022-03-25 NOTE — ED PROVIDER NOTES
"Encounter Date: 3/24/2022       History     Chief Complaint   Patient presents with    Abdominal Pain     55 y/o WF with PMH of thoracic aortic aneurysm, HTN, HLD, Depression, GERD and anxiety presents to the ED with c/o diarreha and abdominal discomfort x1 week. Patient reports everything she eats or drinks goes "straight through her". She denies nausea or vomiting, blood in her stool. Also with c/o left upper quadrant pain and burning that radiates to epigastric area. Denies diaphoresis or shortness of breath. She has taken nothing for her symptoms. There are no exacerbating or remitting factors.     The history is provided by the patient.     Review of patient's allergies indicates:   Allergen Reactions    Betamethasone Other (See Comments)     Flushing    Fentanyl Other (See Comments)     Extreme sedation    Hydrochlorothiazide Other (See Comments)     Reaction unknown    Penicillins Hives     Can take rocephin    Promethazine hcl Other (See Comments)     Hallucinations    Sulfa (sulfonamide antibiotics) Itching    Hydromorphone hcl Palpitations    Latex Rash    Morphine Palpitations     Past Medical History:   Diagnosis Date    Acute superficial gastritis without hemorrhage 10/21/2021    Anxiety     Aortic aneurysm 11/04/2020    w/o rupture    Cervical disc disorder     Chest pain in adult 10/21/2021    Depression     GERD (gastroesophageal reflux disease)     History of transient ischemic attack (TIA) 11/04/2020    Hyperlipidemia     Hypertension     Stroke     Thoracic aortic aneurysm     Vitamin deficiency      Past Surgical History:   Procedure Laterality Date    CARPAL TUNNEL RELEASE  12/21/2017    Dr. Moran with cyst removed from left middle finger    CHOLECYSTECTOMY      TOTAL REDUCTION MAMMOPLASTY       Family History   Problem Relation Age of Onset    Hyperlipidemia Mother     Hypertension Mother     Glaucoma Mother     Diabetes Mother     Cervical cancer Mother     " Pancreatic cancer Mother     Hypertension Father     Dementia Father      Social History     Tobacco Use    Smoking status: Never Smoker    Smokeless tobacco: Never Used   Substance Use Topics    Alcohol use: Never    Drug use: Never     Review of Systems   Constitutional: Negative for chills, fatigue and fever.   HENT: Negative for congestion.    Respiratory: Negative for cough, chest tightness, shortness of breath and wheezing.    Cardiovascular: Positive for chest pain. Negative for palpitations.   Gastrointestinal: Positive for abdominal pain and diarrhea. Negative for blood in stool, constipation, nausea and vomiting.   Genitourinary: Negative for dysuria, flank pain, hematuria and urgency.   Musculoskeletal: Negative for arthralgias and myalgias.   Neurological: Negative for dizziness, syncope, weakness and headaches.   All other systems reviewed and are negative.      Physical Exam     Initial Vitals [03/24/22 2128]   BP Pulse Resp Temp SpO2   (!) 165/81 73 20 98.4 °F (36.9 °C) 98 %      MAP       --         Physical Exam    Constitutional: She appears well-developed and well-nourished. She is cooperative.   Cardiovascular: Normal rate, regular rhythm, normal heart sounds and normal pulses.   Pulmonary/Chest: Effort normal and breath sounds normal.   Abdominal: Abdomen is soft. She exhibits no distension. Bowel sounds are increased. There is no abdominal tenderness. There is no rebound and no guarding.     Neurological: She is alert and oriented to person, place, and time.   Skin: Skin is warm, dry and intact. Capillary refill takes less than 2 seconds.   Psychiatric: She has a normal mood and affect. Her speech is normal and behavior is normal. Judgment and thought content normal. Cognition and memory are normal.         Medical Screening Exam   See Full Note    ED Course   Procedures  Labs Reviewed   URINALYSIS, REFLEX TO URINE CULTURE - Abnormal; Notable for the following components:       Result  Value    Clarity, UA Slightly Cloudy (*)     All other components within normal limits   CBC WITH DIFFERENTIAL - Abnormal; Notable for the following components:    Lymphocytes % 25.5 (*)     Monocytes % 7.5 (*)     All other components within normal limits   MAGNESIUM - Normal   TROPONIN I - Normal   CULTURE, STOOL   FECAL LEUKOCYTES   OVA AND PARASITE EXAM   CBC W/ AUTO DIFFERENTIAL    Narrative:     The following orders were created for panel order CBC auto differential.  Procedure                               Abnormality         Status                     ---------                               -----------         ------                     CBC with Differential[[id number]]        Abnormal            Final result                 Please view results for these tests on the individual orders.   COMPREHENSIVE METABOLIC PANEL          Imaging Results          XR ABDOMEN, ACUTE 2 OR MORE VIEWS WITH CHEST (In process)                  Medications - No data to display             Counseled on supportive measures. Warning s/s discussed and return precautions given; the patient has v/u.         Clinical Impression:   Final diagnoses:  [R07.89] Chest wall pain  [K52.9] Gastroenteritis (Primary)          ED Disposition Condition    Discharge Stable        ED Prescriptions     Medication Sig Dispense Start Date End Date Auth. Provider    ondansetron (ZOFRAN-ODT) 4 MG TbDL Take 1 tablet (4 mg total) by mouth every 8 (eight) hours as needed (nausea). 21 tablet 3/24/2022  MARY Nance        Follow-up Information     Follow up With Specialties Details Why Contact Info    [facility]  As needed [address]  [facility] [place] [address]  [phone]             MARY Nance  03/25/22 0002       MARY Nance  03/25/22 0011

## 2022-03-25 NOTE — DISCHARGE INSTRUCTIONS
Take prescriptions as directed. Follow a clear liquid diet for the next 24 hours then advance as tolerated to a bland diet. Follow up with your primary care provider as needed. Return to the ED for worsening signs and symptoms or otherwise as needed.

## 2022-03-30 ENCOUNTER — LAB VISIT (OUTPATIENT)
Dept: PRIMARY CARE CLINIC | Facility: CLINIC | Age: 57
End: 2022-03-30

## 2022-03-30 DIAGNOSIS — Z02.83 ENCOUNTER FOR EMPLOYMENT-RELATED DRUG TESTING: ICD-10-CM

## 2022-03-30 PROCEDURE — 99000 SPECIMEN HANDLING OFFICE-LAB: CPT | Mod: ,,, | Performed by: NURSE PRACTITIONER

## 2022-03-30 PROCEDURE — 99000 PR URINE DRUG SCREEN COLLECTION: ICD-10-PCS | Mod: ,,, | Performed by: NURSE PRACTITIONER

## 2022-03-30 NOTE — PROGRESS NOTES
Subjective:       Patient ID: Mally Woods is a 56 y.o. female.    Chief Complaint: No chief complaint on file.    HPI  Review of Systems      Objective:      Physical Exam    Assessment:       Problem List Items Addressed This Visit    None     Visit Diagnoses     Encounter for employment-related drug testing              Plan:       Drug testing only

## 2022-05-24 ENCOUNTER — OFFICE VISIT (OUTPATIENT)
Dept: GASTROENTEROLOGY | Facility: CLINIC | Age: 57
End: 2022-05-24
Payer: COMMERCIAL

## 2022-05-24 VITALS
WEIGHT: 264.63 LBS | SYSTOLIC BLOOD PRESSURE: 147 MMHG | HEART RATE: 79 BPM | HEIGHT: 64 IN | OXYGEN SATURATION: 95 % | BODY MASS INDEX: 45.18 KG/M2 | DIASTOLIC BLOOD PRESSURE: 63 MMHG

## 2022-05-24 DIAGNOSIS — K21.9 GASTROESOPHAGEAL REFLUX DISEASE, UNSPECIFIED WHETHER ESOPHAGITIS PRESENT: ICD-10-CM

## 2022-05-24 DIAGNOSIS — K21.00 GASTROESOPHAGEAL REFLUX DISEASE WITH ESOPHAGITIS WITHOUT HEMORRHAGE: Primary | ICD-10-CM

## 2022-05-24 DIAGNOSIS — K76.0 FATTY LIVER: ICD-10-CM

## 2022-05-24 PROCEDURE — 1160F PR REVIEW ALL MEDS BY PRESCRIBER/CLIN PHARMACIST DOCUMENTED: ICD-10-PCS | Mod: CPTII,,, | Performed by: NURSE PRACTITIONER

## 2022-05-24 PROCEDURE — 3078F DIAST BP <80 MM HG: CPT | Mod: CPTII,,, | Performed by: NURSE PRACTITIONER

## 2022-05-24 PROCEDURE — 4010F ACE/ARB THERAPY RXD/TAKEN: CPT | Mod: CPTII,,, | Performed by: NURSE PRACTITIONER

## 2022-05-24 PROCEDURE — 1159F PR MEDICATION LIST DOCUMENTED IN MEDICAL RECORD: ICD-10-PCS | Mod: CPTII,,, | Performed by: NURSE PRACTITIONER

## 2022-05-24 PROCEDURE — 3077F PR MOST RECENT SYSTOLIC BLOOD PRESSURE >= 140 MM HG: ICD-10-PCS | Mod: CPTII,,, | Performed by: NURSE PRACTITIONER

## 2022-05-24 PROCEDURE — 99214 OFFICE O/P EST MOD 30 MIN: CPT | Mod: ,,, | Performed by: NURSE PRACTITIONER

## 2022-05-24 PROCEDURE — 4010F PR ACE/ARB THEARPY RXD/TAKEN: ICD-10-PCS | Mod: CPTII,,, | Performed by: NURSE PRACTITIONER

## 2022-05-24 PROCEDURE — 1160F RVW MEDS BY RX/DR IN RCRD: CPT | Mod: CPTII,,, | Performed by: NURSE PRACTITIONER

## 2022-05-24 PROCEDURE — 3077F SYST BP >= 140 MM HG: CPT | Mod: CPTII,,, | Performed by: NURSE PRACTITIONER

## 2022-05-24 PROCEDURE — 1159F MED LIST DOCD IN RCRD: CPT | Mod: CPTII,,, | Performed by: NURSE PRACTITIONER

## 2022-05-24 PROCEDURE — 3008F PR BODY MASS INDEX (BMI) DOCUMENTED: ICD-10-PCS | Mod: CPTII,,, | Performed by: NURSE PRACTITIONER

## 2022-05-24 PROCEDURE — 99214 PR OFFICE/OUTPT VISIT, EST, LEVL IV, 30-39 MIN: ICD-10-PCS | Mod: ,,, | Performed by: NURSE PRACTITIONER

## 2022-05-24 PROCEDURE — 3008F BODY MASS INDEX DOCD: CPT | Mod: CPTII,,, | Performed by: NURSE PRACTITIONER

## 2022-05-24 PROCEDURE — 3078F PR MOST RECENT DIASTOLIC BLOOD PRESSURE < 80 MM HG: ICD-10-PCS | Mod: CPTII,,, | Performed by: NURSE PRACTITIONER

## 2022-05-24 RX ORDER — PANTOPRAZOLE SODIUM 40 MG/1
40 TABLET, DELAYED RELEASE ORAL DAILY
Qty: 90 TABLET | Refills: 1 | Status: SHIPPED | OUTPATIENT
Start: 2022-05-24 | End: 2022-12-12

## 2022-05-24 NOTE — PROGRESS NOTES
Mally Woods is a 56 y.o. female here for Gastroesophageal Reflux        PCP: Emily Smalls  Referring Provider: No referring provider defined for this encounter.     HPI:  Presents for follow up appointment. States that she has some epigastric discomfort that radiates under the left rib cage at times. Recent evaluation by cardiology. Denies hematochezia and melena. Last EGD Oct 2021 reviewed.Admits to not taking Protonix. Amylase and lipase normal. ALT 58, AST, and Alk Phos normal. Does have IBS. Previous cholecystectomy. Endorses loose stool especially after eating fat and lettuce. Stool studies were ordered but not turned in. States that the diarrhea had improved. States this was clear. 4/22/22 CT angio reviewed performed at [facility], 4.6 cm AA that is reported as stable. Hepatic steatosis reported on CT.        ROS:  Review of Systems   Constitutional: Negative for appetite change, fatigue, fever and unexpected weight change.   HENT: Negative for trouble swallowing.    Respiratory: Negative for shortness of breath.    Cardiovascular: Negative for chest pain.   Gastrointestinal: Positive for diarrhea (loose stool after eating lettuce. Previous cholecystectomy). Negative for abdominal pain, blood in stool, change in bowel habit, constipation, nausea, vomiting, reflux and change in bowel habit.   Musculoskeletal: Positive for arthralgias and myalgias. Negative for gait problem and joint swelling.   Integumentary:  Negative for pallor.   Neurological: Negative for dizziness and light-headedness.   Hematological: Does not bruise/bleed easily.   Psychiatric/Behavioral: The patient is not nervous/anxious.           PMHX:  has a past medical history of Acute superficial gastritis without hemorrhage (10/21/2021), Anxiety, Aortic aneurysm (11/04/2020), Cervical disc disorder, Chest pain in adult (10/21/2021), Depression, GERD (gastroesophageal reflux disease), History of transient ischemic attack (TIA)  (11/04/2020), Hyperlipidemia, Hypertension, Stroke, Thoracic aortic aneurysm, and Vitamin deficiency.    PSHX:  has a past surgical history that includes Cholecystectomy; Carpal tunnel release (12/21/2017); and Total Reduction Mammoplasty.    PFHX: family history includes Cervical cancer in her mother; Dementia in her father; Diabetes in her mother; Glaucoma in her mother; Hyperlipidemia in her mother; Hypertension in her father and mother; Pancreatic cancer in her mother.    PSlHX:  reports that she has never smoked. She has never used smokeless tobacco. She reports that she does not drink alcohol and does not use drugs.        Review of patient's allergies indicates:   Allergen Reactions    Betamethasone Other (See Comments)     Flushing    Fentanyl Other (See Comments)     Extreme sedation    Hydrochlorothiazide Other (See Comments)     Reaction unknown    Penicillins Hives     Can take rocephin    Promethazine hcl Other (See Comments)     Hallucinations    Sulfa (sulfonamide antibiotics) Itching    Hydromorphone hcl Palpitations    Latex Rash    Morphine Palpitations       Medication List with Changes/Refills   Current Medications    ERGOCALCIFEROL (ERGOCALCIFEROL) 50,000 UNIT CAP    Take 1 Softgel BY MOUTH TWICE WEEKLY AS DIRECTED    LISINOPRIL (PRINIVIL,ZESTRIL) 2.5 MG TABLET    TAKE 1 TABLET BY MOUTH TWICE DAILY FOR HIGH BLOOD PRESSURE    MECOBALAMIN, VITAMIN B12, 1,000 MCG TBDL    1 tablet under the tongue and allow to dissolve Once a day Sublingual 30 days    ONDANSETRON (ZOFRAN-ODT) 4 MG TBDL    Take 1 tablet (4 mg total) by mouth every 8 (eight) hours as needed (nausea).   Changed and/or Refilled Medications    Modified Medication Previous Medication    PANTOPRAZOLE (PROTONIX) 40 MG TABLET pantoprazole (PROTONIX) 40 MG tablet       Take 1 tablet (40 mg total) by mouth once daily.    Take 1 tablet (40 mg total) by mouth 2 (two) times daily.        Objective Findings:  Vital Signs:  BP (!) 147/63   " Pulse 79   Ht 5' 4" (1.626 m)   Wt 120 kg (264 lb 9.6 oz)   LMP  (LMP Unknown)   SpO2 95%   BMI 45.42 kg/m²  Body mass index is 45.42 kg/m².    Physical Exam:  Physical Exam  Vitals and nursing note reviewed.   Constitutional:       General: She is not in acute distress.     Appearance: Normal appearance. She is obese. She is not ill-appearing.   HENT:      Mouth/Throat:      Mouth: Mucous membranes are moist.   Cardiovascular:      Rate and Rhythm: Normal rate.   Pulmonary:      Breath sounds: No wheezing, rhonchi or rales.   Abdominal:      General: Bowel sounds are normal. There is no distension.      Palpations: Abdomen is soft. There is no mass.      Tenderness: There is no abdominal tenderness. There is no guarding or rebound.      Hernia: No hernia is present.   Musculoskeletal:      Right lower leg: No edema.      Left lower leg: No edema.   Skin:     General: Skin is warm and dry.      Coloration: Skin is not jaundiced or pale.   Neurological:      Mental Status: She is alert and oriented to person, place, and time.   Psychiatric:         Mood and Affect: Mood normal.          Labs:  Lab Results   Component Value Date    WBC 8.76 05/23/2022    HGB 12.9 05/23/2022    HCT 38.8 05/23/2022    MCV 90.9 05/23/2022    RDW 12.7 05/23/2022     05/23/2022    LYMPH 27.1 05/23/2022    LYMPH 2.37 05/23/2022    MONO 9.8 (H) 05/23/2022    EOS 0.22 05/23/2022    BASO 0.05 05/23/2022     Lab Results   Component Value Date     05/23/2022    K 4.4 05/23/2022     05/23/2022    CO2 28 05/23/2022    GLU 93 05/23/2022    BUN 18 05/23/2022    CREATININE 0.78 05/23/2022    CALCIUM 9.0 05/23/2022    PROT 6.7 05/23/2022    ALBUMIN 3.6 05/23/2022    BILITOT 0.2 05/23/2022    ALKPHOS 115 05/23/2022    AST 28 05/23/2022    ALT 58 (H) 05/23/2022         Imaging: No results found.      Assessment:  Mally Woods is a 56 y.o. female here with:  1. Gastroesophageal reflux disease with esophagitis without " hemorrhage    2. Fatty liver    3. Gastroesophageal reflux disease, unspecified whether esophagitis present          Recommendations:  1. Start Protonix 40 mg daily   2. Avoid spicy, greasy foods  Avoid caffeine, citric acid, chocolate, peppermint, and carbonated drinks  Do not lay down within 3 hours of eating  Increase fluid to 64 ounces daily  Avoid antiinflammatory medications such as motrin, advil, aleve, ibuprofen, and BC powder  3.Exercise 150 minutes per week  4.Weight loss of 7-10%. Weight loss should be gradual  5.Diet low in saturated fats and carbohydrates  6.Good glucose and cholesterol control          Follow up in about 3 months (around 8/24/2022).      Order summary:  Orders Placed This Encounter    US Elastography Liver    pantoprazole (PROTONIX) 40 MG tablet       Thank you for allowing me to participate in the care of Mally Woods.      Emily Mendoza, SHELBYC

## 2022-05-24 NOTE — PATIENT INSTRUCTIONS
Metamucil supplements according to bottle    Avoid lettuce and roughage    Avoid spicy, greasy foods  Avoid caffeine, citric acid, chocolate, peppermint, and carbonated drinks  Do not lay down within 3 hours of eating  Exercise 150 minutes per week  Increase fluid to 64 ounces daily  Avoid antiinflammatory medications such as motrin, advil, aleve, ibuprofen, and BC powder    Protonix 40 mg daily    Weight loss 7 %

## 2022-06-06 ENCOUNTER — HOSPITAL ENCOUNTER (OUTPATIENT)
Dept: RADIOLOGY | Facility: HOSPITAL | Age: 57
Discharge: HOME | End: 2022-06-06
Attending: NURSE PRACTITIONER
Payer: COMMERCIAL

## 2022-06-06 DIAGNOSIS — K76.0 FATTY LIVER: ICD-10-CM

## 2022-06-06 PROCEDURE — 91200 US ELASTOGRAPHY LIVER: ICD-10-PCS | Mod: 26,,, | Performed by: RADIOLOGY

## 2022-06-06 PROCEDURE — 91200 LIVER ELASTOGRAPHY: CPT | Mod: 26,,, | Performed by: RADIOLOGY

## 2022-06-06 PROCEDURE — 91200 LIVER ELASTOGRAPHY: CPT | Mod: TC

## 2022-06-14 ENCOUNTER — OFFICE VISIT (OUTPATIENT)
Dept: FAMILY MEDICINE | Facility: CLINIC | Age: 57
End: 2022-06-14
Payer: COMMERCIAL

## 2022-06-14 VITALS
OXYGEN SATURATION: 98 % | SYSTOLIC BLOOD PRESSURE: 130 MMHG | HEART RATE: 66 BPM | RESPIRATION RATE: 18 BRPM | WEIGHT: 257.38 LBS | HEIGHT: 64 IN | TEMPERATURE: 98 F | DIASTOLIC BLOOD PRESSURE: 80 MMHG | BODY MASS INDEX: 43.94 KG/M2

## 2022-06-14 DIAGNOSIS — K52.9 GASTROENTERITIS: Primary | ICD-10-CM

## 2022-06-14 PROCEDURE — 3079F PR MOST RECENT DIASTOLIC BLOOD PRESSURE 80-89 MM HG: ICD-10-PCS | Mod: CPTII,,, | Performed by: NURSE PRACTITIONER

## 2022-06-14 PROCEDURE — 99213 PR OFFICE/OUTPT VISIT, EST, LEVL III, 20-29 MIN: ICD-10-PCS | Mod: ,,, | Performed by: NURSE PRACTITIONER

## 2022-06-14 PROCEDURE — 4010F ACE/ARB THERAPY RXD/TAKEN: CPT | Mod: CPTII,,, | Performed by: NURSE PRACTITIONER

## 2022-06-14 PROCEDURE — 1160F RVW MEDS BY RX/DR IN RCRD: CPT | Mod: CPTII,,, | Performed by: NURSE PRACTITIONER

## 2022-06-14 PROCEDURE — 3079F DIAST BP 80-89 MM HG: CPT | Mod: CPTII,,, | Performed by: NURSE PRACTITIONER

## 2022-06-14 PROCEDURE — 99213 OFFICE O/P EST LOW 20 MIN: CPT | Mod: ,,, | Performed by: NURSE PRACTITIONER

## 2022-06-14 PROCEDURE — 3008F PR BODY MASS INDEX (BMI) DOCUMENTED: ICD-10-PCS | Mod: CPTII,,, | Performed by: NURSE PRACTITIONER

## 2022-06-14 PROCEDURE — 4010F PR ACE/ARB THEARPY RXD/TAKEN: ICD-10-PCS | Mod: CPTII,,, | Performed by: NURSE PRACTITIONER

## 2022-06-14 PROCEDURE — 3008F BODY MASS INDEX DOCD: CPT | Mod: CPTII,,, | Performed by: NURSE PRACTITIONER

## 2022-06-14 PROCEDURE — 1160F PR REVIEW ALL MEDS BY PRESCRIBER/CLIN PHARMACIST DOCUMENTED: ICD-10-PCS | Mod: CPTII,,, | Performed by: NURSE PRACTITIONER

## 2022-06-14 PROCEDURE — 1159F PR MEDICATION LIST DOCUMENTED IN MEDICAL RECORD: ICD-10-PCS | Mod: CPTII,,, | Performed by: NURSE PRACTITIONER

## 2022-06-14 PROCEDURE — 3075F PR MOST RECENT SYSTOLIC BLOOD PRESS GE 130-139MM HG: ICD-10-PCS | Mod: CPTII,,, | Performed by: NURSE PRACTITIONER

## 2022-06-14 PROCEDURE — 1159F MED LIST DOCD IN RCRD: CPT | Mod: CPTII,,, | Performed by: NURSE PRACTITIONER

## 2022-06-14 PROCEDURE — 3075F SYST BP GE 130 - 139MM HG: CPT | Mod: CPTII,,, | Performed by: NURSE PRACTITIONER

## 2022-06-14 RX ORDER — LISINOPRIL 5 MG/1
5 TABLET ORAL DAILY
COMMUNITY
Start: 2022-05-23 | End: 2022-06-23

## 2022-06-14 RX ORDER — CIPROFLOXACIN 500 MG/1
500 TABLET ORAL EVERY 12 HOURS
Qty: 14 TABLET | Refills: 0 | Status: SHIPPED | OUTPATIENT
Start: 2022-06-14 | End: 2022-07-19 | Stop reason: ALTCHOICE

## 2022-06-14 RX ORDER — ONDANSETRON 4 MG/1
4 TABLET, ORALLY DISINTEGRATING ORAL EVERY 8 HOURS PRN
Qty: 21 TABLET | Refills: 0 | Status: SHIPPED | OUTPATIENT
Start: 2022-06-14 | End: 2022-11-19 | Stop reason: SDUPTHER

## 2022-06-14 RX ORDER — METRONIDAZOLE 500 MG/1
500 TABLET ORAL EVERY 12 HOURS
Qty: 14 TABLET | Refills: 0 | Status: SHIPPED | OUTPATIENT
Start: 2022-06-14 | End: 2022-07-19 | Stop reason: ALTCHOICE

## 2022-06-14 NOTE — LETTER
June 14, 2022      [facility]  [address]  [place] [address]  Phone: [phone]  Fax: [phone]       Patient: Mally Woods   YOB: 1965  Date of Visit: 06/14/2022    To Whom It May Concern:    Moises Woods  was at [facility] on 06/13/2022. The patient may return to work/school on 06/14/2022. If you have any questions or concerns, or if I can be of further assistance, please do not hesitate to contact me.      Sincerely,    MARY Broderick

## 2022-06-14 NOTE — PROGRESS NOTES
MARY Mancilla   [facility]  [address]  [place] [address]     PATIENT NAME: Mally Woods  : 1965  DATE: 22  MRN: [id number]      Billing Provider: MARY Mancilla  Level of Service: NM OFFICE/OUTPT VISIT, EST, LEVL III, 20-29 MIN  Patient PCP Information     Provider PCP Type    MARY Mancilla [facility]          Reason for Visit / Chief Complaint: Diarrhea (Started .  Can't eat or drink anything without having to go to the bathroom 10-15 minutes later.  Feeling weak.  Has had diarrhea so much her back is hurting.) and Nausea (Started .  Has not had any vomiting.)       Update PCP  Update Chief Complaint         History of Present Illness / Problem Focused Workflow     Mally Woods presents to the clinic c/o diarrhea and nausea for the past 2 days. Pt ate take out boston butt and potato salad Saturday      Review of Systems     Review of Systems   Constitutional: Positive for fatigue. Negative for activity change, appetite change and unexpected weight change.   Eyes: Negative for visual disturbance.   Respiratory: Negative for shortness of breath.    Cardiovascular: Negative for chest pain and palpitations.   Gastrointestinal: Positive for abdominal pain, diarrhea and nausea. Negative for blood in stool and vomiting.   Endocrine: Negative for cold intolerance, heat intolerance, polydipsia, polyphagia and polyuria.   Genitourinary: Negative for frequency.   Neurological: Negative for dizziness, weakness and headaches.        Medical / Social / Family History     Past Medical History:   Diagnosis Date    Acute superficial gastritis without hemorrhage 10/21/2021    Anxiety     Aortic aneurysm 2020    w/o rupture    Cervical disc disorder     Chest pain in adult 10/21/2021    Depression     GERD (gastroesophageal reflux disease)     History of transient ischemic attack (TIA) 2020    Hyperlipidemia     Hypertension     Stroke      Thoracic aortic aneurysm     Vitamin deficiency        Past Surgical History:   Procedure Laterality Date    CARPAL TUNNEL RELEASE  12/21/2017    Dr. Moran with cyst removed from left middle finger    CHOLECYSTECTOMY      TOTAL REDUCTION MAMMOPLASTY         Social History  Ms.  reports that she has never smoked. She has never used smokeless tobacco. She reports that she does not drink alcohol and does not use drugs.    Family History  Ms.'s family history includes Cervical cancer in her mother; Dementia in her father; Diabetes in her mother; Glaucoma in her mother; Hyperlipidemia in her mother; Hypertension in her father and mother; Pancreatic cancer in her mother.    Medications and Allergies     Medications  Outpatient Medications Marked as Taking for the 6/14/22 encounter (Office Visit) with MARY Broderick   Medication Sig Dispense Refill    ergocalciferol (ERGOCALCIFEROL) 50,000 unit Cap Take 1 Softgel BY MOUTH TWICE WEEKLY AS DIRECTED 14 capsule 0    lisinopriL (PRINIVIL,ZESTRIL) 2.5 MG tablet TAKE 1 TABLET BY MOUTH TWICE DAILY FOR HIGH BLOOD PRESSURE 60 tablet 5    lisinopriL (PRINIVIL,ZESTRIL) 5 MG tablet Take 5 mg by mouth once daily.      mecobalamin, vitamin B12, 1,000 mcg TbDL 1 tablet under the tongue and allow to dissolve Once a day Sublingual 30 days 30 tablet 5    pantoprazole (PROTONIX) 40 MG tablet Take 1 tablet (40 mg total) by mouth once daily. 90 tablet 1       Allergies  Review of patient's allergies indicates:   Allergen Reactions    Betamethasone Other (See Comments)     Flushing    Fentanyl Other (See Comments)     Extreme sedation    Hydrochlorothiazide Other (See Comments)     Reaction unknown    Penicillins Hives     Can take rocephin    Promethazine hcl Other (See Comments)     Hallucinations    Sulfa (sulfonamide antibiotics) Itching    Hydromorphone hcl Palpitations    Latex Rash    Morphine Palpitations       Physical Examination     Vitals:    06/14/22 0837  "  BP: 130/80   BP Location: Right arm   Patient Position: Sitting   BP Method: Large (Manual)   Pulse: 66   Resp: 18   Temp: 98.3 °F (36.8 °C)   TempSrc: Oral   SpO2: 98%   Weight: 116.8 kg (257 lb 6.4 oz)   Height: 5' 4" (1.626 m)      Physical Exam  Constitutional:       General: She is not in acute distress.     Appearance: Normal appearance.   Neck:      Thyroid: No thyromegaly.      Vascular: No carotid bruit.   Cardiovascular:      Rate and Rhythm: Normal rate and regular rhythm.      Heart sounds: Normal heart sounds. No murmur heard.  Pulmonary:      Effort: Pulmonary effort is normal. No accessory muscle usage or respiratory distress.      Breath sounds: Normal breath sounds. No wheezing, rhonchi or rales.   Abdominal:      General: Bowel sounds are increased.      Palpations: Abdomen is soft.      Tenderness: There is no abdominal tenderness.   Musculoskeletal:         General: Normal range of motion.      Cervical back: Neck supple.      Right lower leg: No edema.      Left lower leg: No edema.   Skin:     General: Skin is warm and dry.      Coloration: Skin is not jaundiced or pale.   Neurological:      Mental Status: She is alert and oriented to person, place, and time.      Gait: Gait is intact.          Assessment and Plan (including Health Maintenance)      Problem List  Smart Sets  Document Outside HM   :          Health Maintenance Due   Topic Date Due    Cervical Cancer Screening  Never done    Lipid Panel  Never done    TETANUS VACCINE  Never done    Colorectal Cancer Screening  Never done       Problem List Items Addressed This Visit    None     Visit Diagnoses     Gastroenteritis    -  Primary    Will treat with cipro and flagyl along with zofran odt and clear liquid diet for 48 hours        Gastroenteritis  Comments:  Will treat with cipro and flagyl along with zofran odt and clear liquid diet for 48 hours       Health Maintenance Topics with due status: Not Due       Topic Last Completion " Date    Mammogram 03/24/2022    Influenza Vaccine Not Due       Procedures     Future Appointments   Date Time Provider Department Center   8/25/2022  2:15 PM MARY [facility]   5/4/2023  2:40 PM [facility]        Follow up in about 1 week (around 6/21/2022), or if symptoms worsen or fail to improve.     Signature:  MARY Mancilla    Date of encounter: 6/14/22

## 2022-06-23 RX ORDER — LISINOPRIL 5 MG/1
TABLET ORAL
Qty: 30 TABLET | Refills: 5 | Status: SHIPPED | OUTPATIENT
Start: 2022-06-23 | End: 2022-12-09

## 2022-07-19 ENCOUNTER — OFFICE VISIT (OUTPATIENT)
Dept: FAMILY MEDICINE | Facility: CLINIC | Age: 57
End: 2022-07-19
Payer: COMMERCIAL

## 2022-07-19 VITALS
WEIGHT: 264 LBS | BODY MASS INDEX: 45.07 KG/M2 | HEIGHT: 64 IN | DIASTOLIC BLOOD PRESSURE: 82 MMHG | RESPIRATION RATE: 18 BRPM | SYSTOLIC BLOOD PRESSURE: 128 MMHG | HEART RATE: 89 BPM | OXYGEN SATURATION: 98 %

## 2022-07-19 DIAGNOSIS — M25.50 ARTHRALGIA, UNSPECIFIED JOINT: Primary | ICD-10-CM

## 2022-07-19 DIAGNOSIS — M94.0 COSTOCHONDRITIS: ICD-10-CM

## 2022-07-19 PROBLEM — S93.401A SPRAIN OF RIGHT ANKLE: Status: RESOLVED | Noted: 2021-07-16 | Resolved: 2022-07-19

## 2022-07-19 PROBLEM — M25.572 LEFT ANKLE PAIN: Status: RESOLVED | Noted: 2021-07-16 | Resolved: 2022-07-19

## 2022-07-19 PROBLEM — M25.571 ACUTE RIGHT ANKLE PAIN: Status: RESOLVED | Noted: 2021-07-16 | Resolved: 2022-07-19

## 2022-07-19 PROCEDURE — 3079F DIAST BP 80-89 MM HG: CPT | Mod: CPTII,,, | Performed by: NURSE PRACTITIONER

## 2022-07-19 PROCEDURE — 1159F PR MEDICATION LIST DOCUMENTED IN MEDICAL RECORD: ICD-10-PCS | Mod: CPTII,,, | Performed by: NURSE PRACTITIONER

## 2022-07-19 PROCEDURE — 1160F PR REVIEW ALL MEDS BY PRESCRIBER/CLIN PHARMACIST DOCUMENTED: ICD-10-PCS | Mod: CPTII,,, | Performed by: NURSE PRACTITIONER

## 2022-07-19 PROCEDURE — 4010F ACE/ARB THERAPY RXD/TAKEN: CPT | Mod: CPTII,,, | Performed by: NURSE PRACTITIONER

## 2022-07-19 PROCEDURE — 3074F SYST BP LT 130 MM HG: CPT | Mod: CPTII,,, | Performed by: NURSE PRACTITIONER

## 2022-07-19 PROCEDURE — 99213 PR OFFICE/OUTPT VISIT, EST, LEVL III, 20-29 MIN: ICD-10-PCS | Mod: ,,, | Performed by: NURSE PRACTITIONER

## 2022-07-19 PROCEDURE — 4010F PR ACE/ARB THEARPY RXD/TAKEN: ICD-10-PCS | Mod: CPTII,,, | Performed by: NURSE PRACTITIONER

## 2022-07-19 PROCEDURE — 1160F RVW MEDS BY RX/DR IN RCRD: CPT | Mod: CPTII,,, | Performed by: NURSE PRACTITIONER

## 2022-07-19 PROCEDURE — 3008F PR BODY MASS INDEX (BMI) DOCUMENTED: ICD-10-PCS | Mod: CPTII,,, | Performed by: NURSE PRACTITIONER

## 2022-07-19 PROCEDURE — 3008F BODY MASS INDEX DOCD: CPT | Mod: CPTII,,, | Performed by: NURSE PRACTITIONER

## 2022-07-19 PROCEDURE — 1159F MED LIST DOCD IN RCRD: CPT | Mod: CPTII,,, | Performed by: NURSE PRACTITIONER

## 2022-07-19 PROCEDURE — 99213 OFFICE O/P EST LOW 20 MIN: CPT | Mod: ,,, | Performed by: NURSE PRACTITIONER

## 2022-07-19 PROCEDURE — 3074F PR MOST RECENT SYSTOLIC BLOOD PRESSURE < 130 MM HG: ICD-10-PCS | Mod: CPTII,,, | Performed by: NURSE PRACTITIONER

## 2022-07-19 PROCEDURE — 3079F PR MOST RECENT DIASTOLIC BLOOD PRESSURE 80-89 MM HG: ICD-10-PCS | Mod: CPTII,,, | Performed by: NURSE PRACTITIONER

## 2022-07-19 NOTE — LETTER
July 19, 2022      [facility]  [address]  [place] [address]  Phone: [phone]  Fax: [phone]       Patient: Mally Woods   YOB: 1965  Date of Visit: 07/19/2022    To Whom It May Concern:    Moises Woods  was at [facility] on 07/19/2022. The patient may return to work/school on 07/20/2022 with no restrictions. If you have any questions or concerns, or if I can be of further assistance, please do not hesitate to contact me.    Sincerely,    Teresa Ramirez RN

## 2022-07-19 NOTE — PROGRESS NOTES
"   MARY Mancilla   [facility]  [address]  [place] [address]     PATIENT NAME: Mally Woods  : 1965  DATE: 22  MRN: [id number]      Billing Provider: MARY Mancilla  Level of Service: OK OFFICE/OUTPT VISIT, EST, LEVL III, 20-29 MIN  Patient PCP Information     Provider PCP Type    MARY Mancilla General          Reason for Visit / Chief Complaint: Follow-up ("Review blood work"/Refer to rheumatology- fatigue, weakness to bilateral arms)       Update PCP  Update Chief Complaint         History of Present Illness / Problem Focused Workflow     Mally Woods presents to the clinic for follow up on lab work that was drawn for the pain clinic. Requests referral to rheumatologist due to continued muscle and joint aching. Hx of fibromyalgia and costochondritis.   Was started on vitamin D and B12 sublingual that has helped fatigue some.      Review of Systems     Review of Systems   Constitutional: Positive for fatigue. Negative for chills, fever and unexpected weight change.   Eyes: Negative for visual disturbance.   Respiratory: Negative for cough, chest tightness and shortness of breath.    Cardiovascular: Negative for chest pain and palpitations.   Gastrointestinal: Negative for abdominal pain, nausea and vomiting.   Endocrine: Negative for cold intolerance, heat intolerance, polydipsia, polyphagia and polyuria.   Genitourinary: Negative for frequency.   Musculoskeletal: Positive for arthralgias (chronic) and myalgias.   Integumentary:  Negative for breast discharge and breast tenderness.   Neurological: Negative for weakness and headaches.   Breast: Negative for tenderness       Medical / Social / Family History     Past Medical History:   Diagnosis Date    Acute superficial gastritis without hemorrhage 10/21/2021    Anxiety     Aortic aneurysm 2020    w/o rupture    Cervical disc disorder     Chest pain in adult 10/21/2021    Depression     History " of transient ischemic attack (TIA) 11/04/2020    Hyperlipidemia     Hypertension     Stroke     Thoracic aortic aneurysm     Vitamin deficiency        Past Surgical History:   Procedure Laterality Date    CARPAL TUNNEL RELEASE  12/21/2017    Dr. Moran with cyst removed from left middle finger    CHOLECYSTECTOMY      TOTAL REDUCTION MAMMOPLASTY         Social History  Ms.  reports that she has never smoked. She has never used smokeless tobacco. She reports that she does not drink alcohol and does not use drugs.    Family History  Ms.'s family history includes Cervical cancer in her mother; Dementia in her father; Diabetes in her mother; Glaucoma in her mother; Hyperlipidemia in her mother; Hypertension in her father and mother; Pancreatic cancer in her mother.    Medications and Allergies     Medications  Outpatient Medications Marked as Taking for the 7/19/22 encounter (Office Visit) with MARY Broderick   Medication Sig Dispense Refill    ergocalciferol (ERGOCALCIFEROL) 50,000 unit Cap Take 1 Softgel BY MOUTH TWICE WEEKLY AS DIRECTED 14 capsule 0    lisinopriL (PRINIVIL,ZESTRIL) 5 MG tablet TAKE 1 TABLET BY MOUTH ONCE DAILY 30 tablet 5    mecobalamin, vitamin B12, 1,000 mcg TbDL 1 tablet under the tongue and allow to dissolve Once a day Sublingual 30 days 30 tablet 5    ondansetron (ZOFRAN-ODT) 4 MG TbDL Take 1 tablet (4 mg total) by mouth every 8 (eight) hours as needed (nausea). 21 tablet 0    pantoprazole (PROTONIX) 40 MG tablet Take 1 tablet (40 mg total) by mouth once daily. 90 tablet 1       Allergies  Review of patient's allergies indicates:   Allergen Reactions    Betamethasone Other (See Comments)     Flushing    Fentanyl Other (See Comments)     Extreme sedation    Hydrochlorothiazide Other (See Comments)     Reaction unknown    Penicillins Hives     Can take rocephin    Promethazine hcl Other (See Comments)     Hallucinations    Sulfa (sulfonamide antibiotics) Itching     "Hydromorphone hcl Palpitations    Latex Rash    Morphine Palpitations       Physical Examination     Vitals:    07/19/22 1557   BP: 128/82   Pulse: 89   Resp: 18   SpO2: 98%   Weight: 119.7 kg (264 lb)   Height: 5' 4" (1.626 m)      Physical Exam  Constitutional:       General: She is not in acute distress.     Appearance: Normal appearance.   HENT:      Nose: No congestion.   Neck:      Thyroid: No thyromegaly.      Vascular: No carotid bruit.   Cardiovascular:      Rate and Rhythm: Normal rate and regular rhythm.      Heart sounds: No murmur heard.  Pulmonary:      Effort: Pulmonary effort is normal. No accessory muscle usage or respiratory distress.      Breath sounds: Normal breath sounds. No wheezing, rhonchi or rales.   Abdominal:      Palpations: Abdomen is soft.   Musculoskeletal:         General: Normal range of motion.        Arms:       Cervical back: Neck supple.   Skin:     General: Skin is warm and dry.      Coloration: Skin is not jaundiced or pale.   Neurological:      Mental Status: She is alert and oriented to person, place, and time.      Gait: Gait is intact.          Assessment and Plan (including Health Maintenance)      Problem List  Smart Sets  Document Outside HM   :          Health Maintenance Due   Topic Date Due    Cervical Cancer Screening  Never done    Lipid Panel  Never done    Pneumococcal Vaccines (Age 0-64) (1 - PCV) Never done    TETANUS VACCINE  Never done    Colorectal Cancer Screening  Never done       Problem List Items Addressed This Visit    None     Visit Diagnoses     Arthralgia, unspecified joint    -  Primary    Will refer to Dr. Parmar for evaluation.    Relevant Orders    Ambulatory referral/consult to Rheumatology    Costochondritis            Arthralgia, unspecified joint  Comments:  Will refer to Dr. Parmar for evaluation.  Orders:  -     Ambulatory referral/consult to Rheumatology; Future; Expected date: 07/26/2022    Costochondritis       Health Maintenance " Topics with due status: Not Due       Topic Last Completion Date    Mammogram 03/24/2022    Influenza Vaccine Not Due       Procedures     Future Appointments   Date Time Provider Department Center   8/25/2022  2:15 PM MARY Fuller [facility] GASTR [facility]   5/4/2023  2:40 PM [facility] MAMMO1 [facility] MAMMO [facility]        Follow up in about 6 months (around 1/19/2023), or if symptoms worsen or fail to improve.     Signature:  MARY Mancilla    Date of encounter: 7/19/22

## 2022-08-09 DIAGNOSIS — M47.812 CERVICAL FACET SYNDROME: Primary | ICD-10-CM

## 2022-08-12 ENCOUNTER — HOSPITAL ENCOUNTER (EMERGENCY)
Facility: HOSPITAL | Age: 57
Discharge: HOME | End: 2022-08-12
Attending: EMERGENCY MEDICINE
Payer: COMMERCIAL

## 2022-08-12 VITALS
RESPIRATION RATE: 12 BRPM | HEIGHT: 64 IN | DIASTOLIC BLOOD PRESSURE: 92 MMHG | WEIGHT: 268.38 LBS | BODY MASS INDEX: 45.82 KG/M2 | TEMPERATURE: 98 F | HEART RATE: 68 BPM | SYSTOLIC BLOOD PRESSURE: 150 MMHG | OXYGEN SATURATION: 99 %

## 2022-08-12 DIAGNOSIS — R07.9 CHEST PAIN: ICD-10-CM

## 2022-08-12 DIAGNOSIS — I49.3 PVC (PREMATURE VENTRICULAR CONTRACTION): Primary | ICD-10-CM

## 2022-08-12 DIAGNOSIS — R00.2 PALPITATIONS: ICD-10-CM

## 2022-08-12 LAB
ALBUMIN SERPL BCP-MCNC: 4.3 G/DL (ref 3.5–5)
ALBUMIN/GLOB SERPL: 1.3 {RATIO}
ALP SERPL-CCNC: 114 U/L (ref 46–118)
ALT SERPL W P-5'-P-CCNC: 165 U/L (ref 13–56)
ANION GAP SERPL CALCULATED.3IONS-SCNC: 13 MMOL/L (ref 7–16)
AST SERPL W P-5'-P-CCNC: 62 U/L (ref 15–37)
BASOPHILS # BLD AUTO: 0.06 K/UL (ref 0–0.2)
BASOPHILS NFR BLD AUTO: 0.8 % (ref 0–1)
BILIRUB SERPL-MCNC: 0.4 MG/DL (ref 0–1.2)
BUN SERPL-MCNC: 14 MG/DL (ref 7–18)
BUN/CREAT SERPL: 17 (ref 6–20)
CALCIUM SERPL-MCNC: 9.7 MG/DL (ref 8.5–10.1)
CHLORIDE SERPL-SCNC: 102 MMOL/L (ref 98–107)
CO2 SERPL-SCNC: 29 MMOL/L (ref 21–32)
CREAT SERPL-MCNC: 0.82 MG/DL (ref 0.55–1.02)
DIFFERENTIAL METHOD BLD: ABNORMAL
EGFR (NO RACE VARIABLE) (RUSH/TITUS): 84 ML/MIN/1.73M²
EOSINOPHIL # BLD AUTO: 0.18 K/UL (ref 0–0.5)
EOSINOPHIL NFR BLD AUTO: 2.5 % (ref 1–4)
ERYTHROCYTE [DISTWIDTH] IN BLOOD BY AUTOMATED COUNT: 12.7 % (ref 11.5–14.5)
GLOBULIN SER-MCNC: 3.2 G/DL (ref 2–4)
GLUCOSE SERPL-MCNC: 102 MG/DL (ref 74–106)
HCT VFR BLD AUTO: 40.9 % (ref 38–47)
HGB BLD-MCNC: 13.4 G/DL (ref 12–16)
IMM GRANULOCYTES # BLD AUTO: 0.03 K/UL (ref 0–0.04)
IMM GRANULOCYTES NFR BLD: 0.4 % (ref 0–0.4)
LYMPHOCYTES # BLD AUTO: 1.86 K/UL (ref 1–4.8)
LYMPHOCYTES NFR BLD AUTO: 26.2 % (ref 27–41)
MAGNESIUM SERPL-MCNC: 2 MG/DL (ref 1.7–2.3)
MCH RBC QN AUTO: 30 PG (ref 27–31)
MCHC RBC AUTO-ENTMCNC: 32.8 G/DL (ref 32–36)
MCV RBC AUTO: 91.5 FL (ref 80–96)
MONOCYTES # BLD AUTO: 0.6 K/UL (ref 0–0.8)
MONOCYTES NFR BLD AUTO: 8.5 % (ref 2–6)
MPC BLD CALC-MCNC: 10.7 FL (ref 9.4–12.4)
NEUTROPHILS # BLD AUTO: 4.36 K/UL (ref 1.8–7.7)
NEUTROPHILS NFR BLD AUTO: 61.6 % (ref 53–65)
NRBC # BLD AUTO: 0 X10E3/UL
NRBC, AUTO (.00): 0 %
PLATELET # BLD AUTO: 325 K/UL (ref 150–400)
POTASSIUM SERPL-SCNC: 4.1 MMOL/L (ref 3.5–5.1)
PROT SERPL-MCNC: 7.5 G/DL (ref 6.4–8.2)
RBC # BLD AUTO: 4.47 M/UL (ref 4.2–5.4)
SODIUM SERPL-SCNC: 140 MMOL/L (ref 136–145)
TROPONIN I SERPL HS-MCNC: <4 PG/ML
WBC # BLD AUTO: 7.09 K/UL (ref 4.5–11)

## 2022-08-12 PROCEDURE — 85025 COMPLETE CBC W/AUTO DIFF WBC: CPT | Performed by: EMERGENCY MEDICINE

## 2022-08-12 PROCEDURE — 99284 EMERGENCY DEPT VISIT MOD MDM: CPT | Mod: ,,, | Performed by: EMERGENCY MEDICINE

## 2022-08-12 PROCEDURE — 83735 ASSAY OF MAGNESIUM: CPT | Performed by: EMERGENCY MEDICINE

## 2022-08-12 PROCEDURE — 99285 EMERGENCY DEPT VISIT HI MDM: CPT | Mod: 25

## 2022-08-12 PROCEDURE — 93010 EKG 12-LEAD: ICD-10-PCS | Mod: ,,, | Performed by: STUDENT IN AN ORGANIZED HEALTH CARE EDUCATION/TRAINING PROGRAM

## 2022-08-12 PROCEDURE — 93010 ELECTROCARDIOGRAM REPORT: CPT | Mod: ,,, | Performed by: STUDENT IN AN ORGANIZED HEALTH CARE EDUCATION/TRAINING PROGRAM

## 2022-08-12 PROCEDURE — 93005 ELECTROCARDIOGRAM TRACING: CPT

## 2022-08-12 PROCEDURE — 80053 COMPREHEN METABOLIC PANEL: CPT | Performed by: EMERGENCY MEDICINE

## 2022-08-12 PROCEDURE — 84484 ASSAY OF TROPONIN QUANT: CPT | Performed by: EMERGENCY MEDICINE

## 2022-08-12 PROCEDURE — 36415 COLL VENOUS BLD VENIPUNCTURE: CPT | Performed by: EMERGENCY MEDICINE

## 2022-08-12 PROCEDURE — 99284 PR EMERGENCY DEPT VISIT,LEVEL IV: ICD-10-PCS | Mod: ,,, | Performed by: EMERGENCY MEDICINE

## 2022-08-12 RX ORDER — PROPRANOLOL HYDROCHLORIDE 10 MG/1
10 TABLET ORAL 3 TIMES DAILY
Qty: 90 TABLET | Refills: 1 | Status: SHIPPED | OUTPATIENT
Start: 2022-08-12 | End: 2022-12-22

## 2022-08-12 NOTE — DISCHARGE INSTRUCTIONS
Work to optimize your diet and exercise.  Follow up with her primary care and with cardiologist.  Will prescribe propranolol.  Return if symptoms are worsening or new symptoms develop

## 2022-08-12 NOTE — ED TRIAGE NOTES
"Pt presents to ED with c/o chest pain "and heart skipping beats." Pt states this started last night. Says she has a known aortic aneurysm that has been being watched since 2015.  "

## 2022-08-12 NOTE — Clinical Note
"Mally"Martha Woods was seen and treated in our emergency department on 8/12/2022.  She may return to work on 08/13/2022.       If you have any questions or concerns, please don't hesitate to call.      DHEERAJ Victor    "

## 2022-08-12 NOTE — ED PROVIDER NOTES
Encounter Date: 8/12/2022       History     Chief Complaint   Patient presents with    Chest Pain     Patient complains of palpitations.  Patient says the palpitations or more frequent and persistent abnormal.  These palpitations for started more than 10 years ago and she actually recently had a Holter monitor that she wore for 3 days about a month ago and was told it was normal.  Her cardiologist is Dr. Le at [facility].  Patient had a negative heart catheterization in the past few years.  She has a stable thoracic aortic aneurysm that was 1st discovered circa 2015 and is monitor but CTs every 4 months.  Her last CT was done 4 months ago.  Patient has had no exertional chest pain.  She says that she has been having chest pain for years with no deviation the nature of that type pain.  She says that she has been diagnosed with costochondritis and fibromyalgia.  The pain is on both sides the chest and lingers throughout the day and is mild to moderate.  Worse with movement.  No associated diaphoresis nausea vomiting or shortness of breath.  No calf tenderness or swelling.  Patient says last night she was having sensations that her heart was skipping a beat back-to-back for extended period of time.  This morning she is feeling well.  Patient is on lisinopril for blood pressure.  She has tried metoprolol several years ago with did not like the way that made her feel.  She has not tried propanolol.  Patient knowledge is that she does have history of anxiety and has taken Lexapro.  Patient feels that when she has palpitations that exacerbates her anxiety which to exacerbates her palpitations.        Review of patient's allergies indicates:   Allergen Reactions    Betamethasone Other (See Comments)     Flushing    Fentanyl Other (See Comments)     Extreme sedation    Hydrochlorothiazide Other (See Comments)     Reaction unknown    Penicillins Hives     Can take rocephin    Promethazine hcl Other (See  Comments)     Hallucinations    Sulfa (sulfonamide antibiotics) Itching    Hydromorphone hcl Palpitations    Latex Rash    Morphine Palpitations     Past Medical History:   Diagnosis Date    Acute superficial gastritis without hemorrhage 10/21/2021    Anxiety     Aortic aneurysm 11/04/2020    w/o rupture    Cervical disc disorder     Chest pain in adult 10/21/2021    Depression     History of transient ischemic attack (TIA) 11/04/2020    Hyperlipidemia     Hypertension     Stroke     Thoracic aortic aneurysm     Vitamin deficiency      Past Surgical History:   Procedure Laterality Date    CARPAL TUNNEL RELEASE  12/21/2017    Dr. Moran with cyst removed from left middle finger    CHOLECYSTECTOMY      TOTAL REDUCTION MAMMOPLASTY       Family History   Problem Relation Age of Onset    Hyperlipidemia Mother     Hypertension Mother     Glaucoma Mother     Diabetes Mother     Cervical cancer Mother     Pancreatic cancer Mother     Hypertension Father     Dementia Father      Social History     Tobacco Use    Smoking status: Never Smoker    Smokeless tobacco: Never Used   Substance Use Topics    Alcohol use: Never    Drug use: Never     Review of Systems   Constitutional: Negative for fever.   HENT: Negative for sore throat.    Respiratory: Negative for shortness of breath.    Cardiovascular: Positive for chest pain and palpitations. Negative for leg swelling.   Gastrointestinal: Negative for nausea.   Genitourinary: Negative for dysuria.   Musculoskeletal: Negative for back pain.   Skin: Negative for rash.   Neurological: Negative for weakness.   Hematological: Does not bruise/bleed easily.       Physical Exam     Initial Vitals   BP Pulse Resp Temp SpO2   08/12/22 0904 08/12/22 0904 08/12/22 0904 08/12/22 0906 08/12/22 0904   (!) 142/71 75 15 98.4 °F (36.9 °C) 99 %      MAP       --                Physical Exam    Nursing note and vitals reviewed.  Constitutional: She appears well-developed  and well-nourished.   HENT:   Head: Normocephalic and atraumatic.   Eyes: EOM are normal. Pupils are equal, round, and reactive to light.   Neck: Neck supple. No thyromegaly present.   Normal range of motion.  Cardiovascular: Normal rate, regular rhythm, normal heart sounds and intact distal pulses.   No murmur heard.  Pulmonary/Chest: Breath sounds normal. No respiratory distress. She has no wheezes.   Abdominal: Abdomen is soft. Bowel sounds are normal. She exhibits no distension. There is no abdominal tenderness.   Musculoskeletal:         General: No tenderness or edema. Normal range of motion.      Cervical back: Normal range of motion and neck supple.     Lymphadenopathy:     She has no cervical adenopathy.   Neurological: She is alert and oriented to person, place, and time. She has normal strength. No cranial nerve deficit or sensory deficit. GCS score is 15. GCS eye subscore is 4. GCS verbal subscore is 5. GCS motor subscore is 6.   Skin: Skin is warm and dry. No rash noted.   Psychiatric: She has a normal mood and affect.         Medical Screening Exam   See Full Note    ED Course   Procedures  Labs Reviewed   CBC W/ AUTO DIFFERENTIAL    Narrative:     The following orders were created for panel order CBC auto differential.  Procedure                               Abnormality         Status                     ---------                               -----------         ------                     CBC with Differential[[id number]]                            In process                   Please view results for these tests on the individual orders.   COMPREHENSIVE METABOLIC PANEL   MAGNESIUM   TROPONIN I   CBC WITH DIFFERENTIAL          Imaging Results    None          Medications - No data to display              ED Course as of 08/12/22 1106   Fri Aug 12, 2022   1102 Cardiac monitor shows some PVCs which patient says is the source for palpitations.  PVCs are infrequent.  Workup shows mildly elevated  transaminases which patient says has been intermittent over chronic.  Time and has been addressed by her gastroenterologist.  She has done better in times past with her diet but recently has not been doing well in terms of she has been eating some foods that she should not.  She will try to optimize her diet.  She will follow-up with her cardiologist.  Will start propanolol.  Patient voiced understanding to return if symptoms worsen or new symptoms develop [PK]      ED Course User Index  [PK] Marco Devries MD          Clinical Impression:   Final diagnoses:  [R00.2] Palpitations  [R07.9] Chest pain                 Marco Devries MD  08/12/22 1105

## 2022-08-16 ENCOUNTER — TELEPHONE (OUTPATIENT)
Dept: GASTROENTEROLOGY | Facility: CLINIC | Age: 57
End: 2022-08-16
Payer: COMMERCIAL

## 2022-08-16 NOTE — TELEPHONE ENCOUNTER
Attempted to return call. Left message.        ----- Message from Yumiko Lara sent at 8/16/2022  2:03 PM CDT -----  Asked to speak with nurse about protonix causing heart palpitations

## 2022-08-17 ENCOUNTER — HOSPITAL ENCOUNTER (OUTPATIENT)
Dept: RADIOLOGY | Facility: HOSPITAL | Age: 57
Discharge: HOME | End: 2022-08-17
Attending: ANESTHESIOLOGY
Payer: COMMERCIAL

## 2022-08-17 ENCOUNTER — TELEPHONE (OUTPATIENT)
Dept: GASTROENTEROLOGY | Facility: CLINIC | Age: 57
End: 2022-08-17
Payer: COMMERCIAL

## 2022-08-17 DIAGNOSIS — M47.812 CERVICAL FACET SYNDROME: ICD-10-CM

## 2022-08-17 PROCEDURE — 72040 X-RAY EXAM NECK SPINE 2-3 VW: CPT | Mod: 26,,, | Performed by: RADIOLOGY

## 2022-08-17 PROCEDURE — 72040 XR CERVICAL SPINE AP LATERAL: ICD-10-PCS | Mod: 26,,, | Performed by: RADIOLOGY

## 2022-08-17 PROCEDURE — 72040 X-RAY EXAM NECK SPINE 2-3 VW: CPT | Mod: TC

## 2022-08-17 NOTE — TELEPHONE ENCOUNTER
Returned  Call to patient. Patient asking if protonix could be causing her to have palpitations. Spoke with Emily Mendoza NP and she states that it could lower her magnesium level, not it shouldn't be causing palpitations. Patient had recent ER visit for PVCs and palpitations. Mag level was 2 on 8/12/22. Advised patient to follow up with her cardiologist if she is still having issues. Verbalized understanding.          ----- Message from Manjinder Edwards MA sent at 8/16/2022  4:05 PM CDT -----  Regarding: call back  Patient is returning phone call. [phone]

## 2022-08-19 ENCOUNTER — CLINICAL SUPPORT (OUTPATIENT)
Dept: REHABILITATION | Facility: HOSPITAL | Age: 57
End: 2022-08-19
Payer: COMMERCIAL

## 2022-08-19 DIAGNOSIS — M47.812 CERVICAL FACET SYNDROME: ICD-10-CM

## 2022-08-19 PROCEDURE — 97161 PT EVAL LOW COMPLEX 20 MIN: CPT | Mod: PN
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
## 2022-08-22 DIAGNOSIS — E55.9 VITAMIN D DEFICIENCY: ICD-10-CM

## 2022-08-22 DIAGNOSIS — E53.8 VITAMIN B12 DEFICIENCY: ICD-10-CM

## 2022-08-22 RX ORDER — ERGOCALCIFEROL 1.25 MG/1
CAPSULE ORAL
Qty: 14 CAPSULE | Refills: 0 | Status: SHIPPED | OUTPATIENT
Start: 2022-08-22 | End: 2022-12-22 | Stop reason: SDUPTHER

## 2022-08-22 RX ORDER — CHOLECALCIFEROL (VITAMIN D3) 125 MCG
CAPSULE ORAL
Qty: 30 TABLET | Refills: 0 | Status: SHIPPED | OUTPATIENT
Start: 2022-08-22

## 2022-08-24 DIAGNOSIS — E78.5 HYPERLIPIDEMIA, UNSPECIFIED HYPERLIPIDEMIA TYPE: Primary | ICD-10-CM

## 2022-09-02 ENCOUNTER — CLINICAL SUPPORT (OUTPATIENT)
Dept: REHABILITATION | Facility: HOSPITAL | Age: 57
End: 2022-09-02
Payer: COMMERCIAL

## 2022-09-02 DIAGNOSIS — M47.812 CERVICAL FACET SYNDROME: Primary | ICD-10-CM

## 2022-09-02 PROCEDURE — 97110 THERAPEUTIC EXERCISES: CPT | Mod: PN,CQ

## 2022-09-02 PROCEDURE — 97035 APP MDLTY 1+ULTRASOUND EA 15: CPT | Mod: PN,CQ

## 2022-09-02 NOTE — PROGRESS NOTES
Physical Therapy Treatment Note     Name: Mally Woods  Clinic Number: [id number]    Therapy Diagnosis:   Encounter Diagnosis   Name Primary?    Cervical facet syndrome Yes     Physician: Rebeca Walter FNP    Visit Date: 9/2/2022    Physician Orders: PT Eval and Treat neck pain  Medical Diagnosis from Referral:M47.812 (ICD-10-CM) - Cervical facet syndrome   Evaluation Date: 8/19/2022  Authorization Period Expiration: 12/30/2022  Plan of Care Expiration: 10/14/2022  Visit # / Visits authorized: 2/ 20  PTA Visit #: 1    Time In: 1444  Time Out: 1530  Total Billable Time: 46 minutes    Precautions: Standard    Received Plan of Care per Po Diaz PT     Subjective     Pt reports: pain as noted; has taken pain meds. 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  Response to previous treatment: no c/o    Pain: 4/10  Location: bilateral neck      Objective     Mally received therapeutic exercises to develop strength, ROM, flexibility, and posture for 33 minutes including:    UBE x 4 minutes   Pulleys x 3 minutes   Doorway pec stretching, 6x10 second hold   Towel slides on wall for bilateral upper extremity flexion x 10 repetitions   Bilateral scapular retraction with rows, extension x 15 repetitions each  Cervical stretches: rotation, lateral flexion, levator scapulae bilaterally at 10 second hold x 6 repetitions each  5 minutes of manual soft tissue massage post CUS prior to stretching and range of motion measures    Cervical range of motion measures: (after all exercises, stretches, CUS)  Rotation Right 60 degrees  Left 67 degrees    Lateral tilt Right 38 degrees Left 32 degrees        Mally received the following direct contact modalities after being cleared for contraindications: Ultrasound:  Mally received ultrasound to  manage pain and inflammation at 100 % duty cycle applied to the bilateral cervical paraspinals at an intensity of  1.5 W/cm2  for a duration of 8 minutes. Patient tolerated treatment well without adverse effects. Therapist was in attendance throughout intervention.    Mally received the following supervised modalities after being cleared for contradictions: IFC Electrical Stimulation:  Mally received IFC Electrical Stimulation for pain control applied to the NA. Pt received stimulation at 100 % scan at a frequency of NA for NA minutes. Mally tolderated treatment well without any adverse effects.    Mally received hot pack for NA minutes to NA.      Home Exercises Provided and Patient Education Provided     Education provided: continue current home exercise program, pain free; has therabands and will add scapular retraction rows, extensions; encouraged pt to set a timer for reminders at work to do posturing exercises and neck range of motion at her desk throughout the day.    Written Home Exercises Provided: Patient instructed to cont prior HEP.  Exercises were reviewed and Mally was able to demonstrate them prior to the end of the session.  Mally demonstrated good  understanding of the education provided.     See EMR under Patient Instructions for exercises provided prior visit.    Assessment     Pt reports feeling better post treatment; neck range of motion measures as noted  Mally Is progressing well towards her goals.   Pt prognosis is Good.     Pt will continue to benefit from skilled outpatient physical therapy to address the deficits listed in the problem list box on initial evaluation, provide pt/family education and to maximize pt's level of independence in the home and community environment.     Anticipated barriers to physical therapy: home exercise program compliance    Goals:  1. Patient will be independent with home exercise program  2. Patient will report decrease in pain at worse to 3/10 to  improve quality of life  3. Patient will hold correct posture independently for 1 minute to improve ergonomic positioning at work    Plan     Plan of care Certification: 8/19/2022 to 10/14/2022.     Outpatient Physical Therapy 2 times weekly for 8 weeks to include the following interventions: Cervical/Lumbar Traction, Electrical Stimulation ifc/premod, Manual Therapy, Moist Heat/ Ice, Neuromuscular Re-ed, Patient Education, Therapeutic Activities, Therapeutic Exercise and Ultrasound.     Continue per Plan of Care   Luna Yao, PTA  9/2/2022

## 2022-09-09 ENCOUNTER — CLINICAL SUPPORT (OUTPATIENT)
Dept: REHABILITATION | Facility: HOSPITAL | Age: 57
End: 2022-09-09
Payer: COMMERCIAL

## 2022-09-09 DIAGNOSIS — M47.812 CERVICAL FACET SYNDROME: Primary | ICD-10-CM

## 2022-09-09 PROCEDURE — 97014 ELECTRIC STIMULATION THERAPY: CPT | Mod: PN

## 2022-09-09 PROCEDURE — 97110 THERAPEUTIC EXERCISES: CPT | Mod: PN

## 2022-09-09 PROCEDURE — 97035 APP MDLTY 1+ULTRASOUND EA 15: CPT | Mod: PN

## 2022-09-11 ENCOUNTER — HOSPITAL ENCOUNTER (EMERGENCY)
Facility: HOSPITAL | Age: 57
Discharge: HOME | End: 2022-09-11
Attending: EMERGENCY MEDICINE
Payer: COMMERCIAL

## 2022-09-11 VITALS
TEMPERATURE: 98 F | HEART RATE: 73 BPM | OXYGEN SATURATION: 97 % | SYSTOLIC BLOOD PRESSURE: 141 MMHG | RESPIRATION RATE: 16 BRPM | DIASTOLIC BLOOD PRESSURE: 67 MMHG | BODY MASS INDEX: 45.41 KG/M2 | WEIGHT: 266 LBS | HEIGHT: 64 IN

## 2022-09-11 DIAGNOSIS — T17.908A ASPIRATION INTO AIRWAY, INITIAL ENCOUNTER: ICD-10-CM

## 2022-09-11 DIAGNOSIS — T14.90XA INHALATION INJURY: Primary | ICD-10-CM

## 2022-09-11 LAB
ALBUMIN SERPL BCP-MCNC: 3.9 G/DL (ref 3.5–5)
ALBUMIN/GLOB SERPL: 1.1 {RATIO}
ALP SERPL-CCNC: 87 U/L (ref 46–118)
ALT SERPL W P-5'-P-CCNC: 31 U/L (ref 13–56)
ANION GAP SERPL CALCULATED.3IONS-SCNC: 12 MMOL/L (ref 7–16)
AST SERPL W P-5'-P-CCNC: 20 U/L (ref 15–37)
BASOPHILS # BLD AUTO: 0.08 K/UL (ref 0–0.2)
BASOPHILS NFR BLD AUTO: 0.9 % (ref 0–1)
BILIRUB SERPL-MCNC: 0.3 MG/DL (ref ?–1.2)
BUN SERPL-MCNC: 13 MG/DL (ref 7–18)
BUN/CREAT SERPL: 16 (ref 6–20)
CALCIUM SERPL-MCNC: 9.6 MG/DL (ref 8.5–10.1)
CHLORIDE SERPL-SCNC: 103 MMOL/L (ref 98–107)
CO2 SERPL-SCNC: 28 MMOL/L (ref 21–32)
CREAT SERPL-MCNC: 0.79 MG/DL (ref 0.55–1.02)
DIFFERENTIAL METHOD BLD: ABNORMAL
EGFR (NO RACE VARIABLE) (RUSH/TITUS): 87 ML/MIN/1.73M²
EOSINOPHIL # BLD AUTO: 0.14 K/UL (ref 0–0.5)
EOSINOPHIL NFR BLD AUTO: 1.5 % (ref 1–4)
ERYTHROCYTE [DISTWIDTH] IN BLOOD BY AUTOMATED COUNT: 12.5 % (ref 11.5–14.5)
GLOBULIN SER-MCNC: 3.4 G/DL (ref 2–4)
GLUCOSE SERPL-MCNC: 94 MG/DL (ref 74–106)
HCT VFR BLD AUTO: 38.3 % (ref 38–47)
HGB BLD-MCNC: 12.9 G/DL (ref 12–16)
IMM GRANULOCYTES # BLD AUTO: 0.04 K/UL (ref 0–0.04)
IMM GRANULOCYTES NFR BLD: 0.4 % (ref 0–0.4)
LYMPHOCYTES # BLD AUTO: 2.08 K/UL (ref 1–4.8)
LYMPHOCYTES NFR BLD AUTO: 22.6 % (ref 27–41)
MCH RBC QN AUTO: 30.4 PG (ref 27–31)
MCHC RBC AUTO-ENTMCNC: 33.7 G/DL (ref 32–36)
MCV RBC AUTO: 90.3 FL (ref 80–96)
MONOCYTES # BLD AUTO: 0.64 K/UL (ref 0–0.8)
MONOCYTES NFR BLD AUTO: 6.9 % (ref 2–6)
MPC BLD CALC-MCNC: 10.4 FL (ref 9.4–12.4)
NEUTROPHILS # BLD AUTO: 6.24 K/UL (ref 1.8–7.7)
NEUTROPHILS NFR BLD AUTO: 67.7 % (ref 53–65)
NRBC # BLD AUTO: 0 X10E3/UL
NRBC, AUTO (.00): 0 %
PLATELET # BLD AUTO: 269 K/UL (ref 150–400)
POTASSIUM SERPL-SCNC: 4.2 MMOL/L (ref 3.5–5.1)
PROT SERPL-MCNC: 7.3 G/DL (ref 6.4–8.2)
RBC # BLD AUTO: 4.24 M/UL (ref 4.2–5.4)
SODIUM SERPL-SCNC: 139 MMOL/L (ref 136–145)
WBC # BLD AUTO: 9.22 K/UL (ref 4.5–11)

## 2022-09-11 PROCEDURE — 99284 EMERGENCY DEPT VISIT MOD MDM: CPT | Mod: 25

## 2022-09-11 PROCEDURE — 85025 COMPLETE CBC W/AUTO DIFF WBC: CPT | Performed by: EMERGENCY MEDICINE

## 2022-09-11 PROCEDURE — 99283 PR EMERGENCY DEPT VISIT,LEVEL III: ICD-10-PCS | Mod: ,,, | Performed by: EMERGENCY MEDICINE

## 2022-09-11 PROCEDURE — 80053 COMPREHEN METABOLIC PANEL: CPT | Performed by: EMERGENCY MEDICINE

## 2022-09-11 PROCEDURE — 36415 COLL VENOUS BLD VENIPUNCTURE: CPT | Performed by: EMERGENCY MEDICINE

## 2022-09-11 PROCEDURE — 99283 EMERGENCY DEPT VISIT LOW MDM: CPT | Mod: ,,, | Performed by: EMERGENCY MEDICINE

## 2022-09-11 NOTE — ED PROVIDER NOTES
Encounter Date: 9/11/2022       History     Chief Complaint   Patient presents with    Toxic Inhalation     57 Y/O FEMALE WHO WAS WASHING HER HAIR AND HAS SOME SUDS ON HER FACE WHEN SHE INHALED.  SHE WAS SUBSEQUENTLY COUGHING, BUT THIS HAS MOSTLY SUBSIDED.  SHE IS COMPLAINING OF A SORE THROAT.  SHE NOTES NO REMITTING OF EXACERBATING FACTORS.      Review of patient's allergies indicates:   Allergen Reactions    Betamethasone Other (See Comments)     Flushing    Fentanyl Other (See Comments)     Extreme sedation    Hydrochlorothiazide Other (See Comments)     Reaction unknown    Penicillins Hives     Can take rocephin    Promethazine hcl Other (See Comments)     Hallucinations    Sulfa (sulfonamide antibiotics) Itching    Hydromorphone hcl Palpitations    Latex Rash    Morphine Palpitations     Past Medical History:   Diagnosis Date    Acute superficial gastritis without hemorrhage 10/21/2021    Anxiety     Aortic aneurysm 11/04/2020    w/o rupture    Cervical disc disorder     Chest pain in adult 10/21/2021    Depression     History of transient ischemic attack (TIA) 11/04/2020    Hyperlipidemia     Hypertension     Stroke     Thoracic aortic aneurysm     Vitamin deficiency      Past Surgical History:   Procedure Laterality Date    CARPAL TUNNEL RELEASE  12/21/2017    Dr. Moran with cyst removed from left middle finger    CHOLECYSTECTOMY      TOTAL REDUCTION MAMMOPLASTY       Family History   Problem Relation Age of Onset    Hyperlipidemia Mother     Hypertension Mother     Glaucoma Mother     Diabetes Mother     Cervical cancer Mother     Pancreatic cancer Mother     Hypertension Father     Dementia Father      Social History     Tobacco Use    Smoking status: Never    Smokeless tobacco: Never   Substance Use Topics    Alcohol use: Never    Drug use: Never     Review of Systems   All other systems reviewed and are negative.    Physical Exam     Initial Vitals [09/11/22 1200]   BP Pulse Resp Temp SpO2   (!) 141/67 73  16 98 °F (36.7 °C) 97 %      MAP       --         Physical Exam    Nursing note and vitals reviewed.  Constitutional: She appears well-developed and well-nourished.   HENT:   Head: Normocephalic and atraumatic.   Nose: Nose normal.   Mouth/Throat: Oropharynx is clear and moist.   Eyes: Conjunctivae and EOM are normal. Pupils are equal, round, and reactive to light.   Neck: Neck supple.   Normal range of motion.  Cardiovascular:  Normal rate, regular rhythm, normal heart sounds and intact distal pulses.           Pulmonary/Chest: Breath sounds normal.   Abdominal: Abdomen is soft. Bowel sounds are normal.   Musculoskeletal:         General: Normal range of motion.      Cervical back: Normal range of motion and neck supple.     Neurological: She is alert and oriented to person, place, and time. She has normal strength. GCS score is 15. GCS eye subscore is 4. GCS verbal subscore is 5. GCS motor subscore is 6.   Skin: Skin is warm and dry. Capillary refill takes less than 2 seconds.   Psychiatric: She has a normal mood and affect. Thought content normal.       Medical Screening Exam   See Full Note    ED Course   Procedures  Labs Reviewed   CBC WITH DIFFERENTIAL - Abnormal; Notable for the following components:       Result Value    Neutrophils % 67.7 (*)     Lymphocytes % 22.6 (*)     Monocytes % 6.9 (*)     All other components within normal limits   CBC W/ AUTO DIFFERENTIAL    Narrative:     The following orders were created for panel order CBC auto differential.  Procedure                               Abnormality         Status                     ---------                               -----------         ------                     CBC with Differential[[id number]]        Abnormal            Final result                 Please view results for these tests on the individual orders.   COMPREHENSIVE METABOLIC PANEL          Imaging Results              X-Ray Chest AP Portable (Final result)  Result time 09/11/22  13:19:28      Final result by Po Valente MD (09/11/22 13:19:28)                   Impression:      No acute findings.      Electronically signed by: Po Valente  Date:    09/11/2022  Time:    13:19               Narrative:    EXAMINATION:  XR CHEST AP PORTABLE    CLINICAL HISTORY:  Unspecified foreign body in respiratory tract, part unspecified causing other injury, initial encounter    TECHNIQUE:  Single frontal view of the chest was performed.    COMPARISON:  08/12/2022    FINDINGS:  Heart size enlarged and similar.  Lungs clear. No pneumothorax or pleural effusion.                                       Medications - No data to display                    Clinical Impression:   Final diagnoses:  [T17.908A] Aspiration into airway, initial encounter  [T14.90XA] Inhalation injury (Primary)      ED Disposition Condition    Discharge Stable          ED Prescriptions    None       Follow-up Information       Follow up With Specialties Details Why Contact Info    [facility], Emergency Medicine  As needed [address]  [facility] [address]  [phone]               Ruel Hanks MD  09/11/22 9546

## 2022-09-11 NOTE — ED TRIAGE NOTES
Pt presents to ed with c/o inhaling soap suds when she was showering. Poison control informed her to come to ED. Patient states having a cough now   Spoke with Mom. Notified Mom letter is done for patient. Asked Mom if she wanted letter mailed, faxed, or come pick it up. Mom requested letter be mailed. Will mail letter to address on file.

## 2022-09-16 ENCOUNTER — CLINICAL SUPPORT (OUTPATIENT)
Dept: REHABILITATION | Facility: HOSPITAL | Age: 57
End: 2022-09-16
Payer: COMMERCIAL

## 2022-09-16 DIAGNOSIS — M47.812 CERVICAL FACET SYNDROME: Primary | ICD-10-CM

## 2022-09-16 DIAGNOSIS — R29.898 DECREASED RANGE OF MOTION OF NECK: ICD-10-CM

## 2022-09-16 PROCEDURE — 97014 ELECTRIC STIMULATION THERAPY: CPT | Mod: PN,CQ

## 2022-09-16 PROCEDURE — 97110 THERAPEUTIC EXERCISES: CPT | Mod: PN,CQ

## 2022-09-16 NOTE — PROGRESS NOTES
Physical Therapy Treatment Note     Name: Mally Woods  Clinic Number: [id number]    Therapy Diagnosis:   Encounter Diagnoses   Name Primary?    Cervical facet syndrome Yes    Decreased range of motion of neck      Physician: Rebeca Walter FNP    Visit Date: 9/16/2022    Physician Orders: PT Eval and Treat neck pain  Medical Diagnosis from Referral:M47.812 (ICD-10-CM) - Cervical facet syndrome   Evaluation Date: 8/19/2022  Authorization Period Expiration: 12/30/2022  Plan of Care Expiration: 10/14/2022  Visit # / Visits authorized: 4/ 20  PTA Visit #: 1    Time In: 232  Time Out: 325  Total Billable Time: 53 minutes    Precautions: Standard    Received Plan of Care per Po Diaz PT     Subjective     Pt reports: doing ok today  She was compliant with home exercise program.  Response to previous treatment: no c/o    Pain: 3/10  Location: bilateral neck      Objective     Mally received therapeutic exercises to develop strength, ROM, flexibility, and posture for 23 minutes including:    UBE x 4 minutes   Pulleys x 4  minutes   Doorway pec stretching with bilateral rotation x10 second hold   Bilateral side lying head lifts x 10  Supine head lifts x 10   Bilateral scapular retraction with rows, extension x 15 repetitions each  Cervical stretches: rotation, lateral flexion, levator scapulae bilaterally at 10 second hold x 6 repetitions each  Bilateral sternocleidomastoid stretch x 10 reps with manual hold .      Cervical range of motion measures:    Rotation Right 50 degrees  Left 55 degrees    Lateral tilt Right 40 degrees Left 40 degrees      Mally received the following direct contact modalities after being cleared for contraindications: Ultrasound:  Mally received ultrasound to manage pain and inflammation at 100 % duty cycle applied to the bilateral cervical paraspinals at an intensity of  1.5 W/cm2  for a duration of 8 minutes. Patient tolerated treatment well without adverse effects. Therapist was in  attendance throughout intervention.    Pt received ifc to upper back and neck at an intensity of 9 x 20 min with mhp . Pt had no adverse reaction with heat or estim.      Home Exercises Provided and Patient Education Provided     Education provided: continue current home exercise program    Written Home Exercises Provided: Patient instructed to cont prior HEP.  Exercises were reviewed and Mally was able to demonstrate them prior to the end of the session.  Mally demonstrated good  understanding of the education provided.     See EMR under Patient Instructions for exercises provided prior visit.    Assessment     Pt voicing decreased pain after treatment 1/10 after treatment.  Mally Is progressing well towards her goals.   Pt prognosis is Good.     Pt will continue to benefit from skilled outpatient physical therapy to address the deficits listed in the problem list box on initial evaluation, provide pt/family education and to maximize pt's level of independence in the home and community environment.     Anticipated barriers to physical therapy: home exercise program compliance    Goals:  1. Patient will be independent with home exercise program  2. Patient will report decrease in pain at worse to 3/10 to improve quality of life  3. Patient will hold correct posture independently for 1 minute to improve ergonomic positioning at work    Plan     Plan of care Certification: 8/19/2022 to 10/14/2022.     Outpatient Physical Therapy 2 times weekly for 8 weeks to include the following interventions: Cervical/Lumbar Traction, Electrical Stimulation ifc/premod, Manual Therapy, Moist Heat/ Ice, Neuromuscular Re-ed, Patient Education, Therapeutic Activities, Therapeutic Exercise and Ultrasound.     Continue per Plan of Care   Rosalinda Segura, PTA  9/16/2022

## 2022-09-19 ENCOUNTER — OFFICE VISIT (OUTPATIENT)
Dept: GASTROENTEROLOGY | Facility: CLINIC | Age: 57
End: 2022-09-19
Payer: COMMERCIAL

## 2022-09-19 VITALS
HEART RATE: 72 BPM | SYSTOLIC BLOOD PRESSURE: 114 MMHG | HEIGHT: 64 IN | DIASTOLIC BLOOD PRESSURE: 56 MMHG | BODY MASS INDEX: 46.2 KG/M2 | WEIGHT: 270.63 LBS | OXYGEN SATURATION: 94 %

## 2022-09-19 DIAGNOSIS — K76.0 FATTY LIVER: ICD-10-CM

## 2022-09-19 DIAGNOSIS — K21.9 GASTROESOPHAGEAL REFLUX DISEASE, UNSPECIFIED WHETHER ESOPHAGITIS PRESENT: Primary | ICD-10-CM

## 2022-09-19 PROCEDURE — 3008F PR BODY MASS INDEX (BMI) DOCUMENTED: ICD-10-PCS | Mod: CPTII,,, | Performed by: NURSE PRACTITIONER

## 2022-09-19 PROCEDURE — 4010F ACE/ARB THERAPY RXD/TAKEN: CPT | Mod: CPTII,,, | Performed by: NURSE PRACTITIONER

## 2022-09-19 PROCEDURE — 3078F DIAST BP <80 MM HG: CPT | Mod: CPTII,,, | Performed by: NURSE PRACTITIONER

## 2022-09-19 PROCEDURE — 3074F SYST BP LT 130 MM HG: CPT | Mod: CPTII,,, | Performed by: NURSE PRACTITIONER

## 2022-09-19 PROCEDURE — 99214 OFFICE O/P EST MOD 30 MIN: CPT | Mod: ,,, | Performed by: NURSE PRACTITIONER

## 2022-09-19 PROCEDURE — 1159F PR MEDICATION LIST DOCUMENTED IN MEDICAL RECORD: ICD-10-PCS | Mod: CPTII,,, | Performed by: NURSE PRACTITIONER

## 2022-09-19 PROCEDURE — 99214 PR OFFICE/OUTPT VISIT, EST, LEVL IV, 30-39 MIN: ICD-10-PCS | Mod: ,,, | Performed by: NURSE PRACTITIONER

## 2022-09-19 PROCEDURE — 4010F PR ACE/ARB THEARPY RXD/TAKEN: ICD-10-PCS | Mod: CPTII,,, | Performed by: NURSE PRACTITIONER

## 2022-09-19 PROCEDURE — 1159F MED LIST DOCD IN RCRD: CPT | Mod: CPTII,,, | Performed by: NURSE PRACTITIONER

## 2022-09-19 PROCEDURE — 3008F BODY MASS INDEX DOCD: CPT | Mod: CPTII,,, | Performed by: NURSE PRACTITIONER

## 2022-09-19 PROCEDURE — 3078F PR MOST RECENT DIASTOLIC BLOOD PRESSURE < 80 MM HG: ICD-10-PCS | Mod: CPTII,,, | Performed by: NURSE PRACTITIONER

## 2022-09-19 PROCEDURE — 3074F PR MOST RECENT SYSTOLIC BLOOD PRESSURE < 130 MM HG: ICD-10-PCS | Mod: CPTII,,, | Performed by: NURSE PRACTITIONER

## 2022-09-19 NOTE — PROGRESS NOTES
Mally Woods is a 57 y.o. female here for Follow-up        PCP: Emily Smalls  Referring Provider: No referring provider defined for this encounter.     HPI:  Presents for follow up after starting Protonix. Reports that the Protonix has improved her reflux symptoms. She did have an ER visit for inhalation of soap suds while washing her hair. Labs from ER reviewed. CMP is normal. No elevation of liver enzymes. No anemia. Mag normal.States that her diarrhea and IBS symptoms have improved. Does have fatty liver. Last elastography normal to mild fibrosis.    Follow-up  Pertinent negatives include no abdominal pain, change in bowel habit, chest pain, fatigue, fever, nausea or vomiting.       ROS:  Review of Systems   Constitutional:  Negative for appetite change, fatigue, fever and unexpected weight change.   HENT:  Negative for trouble swallowing.    Cardiovascular:  Negative for chest pain.   Gastrointestinal:  Positive for reflux. Negative for abdominal pain, blood in stool, change in bowel habit, constipation, diarrhea (improved), nausea, vomiting and change in bowel habit.   Musculoskeletal:  Negative for gait problem.   Integumentary:  Negative for pallor.   Neurological:  Negative for dizziness and light-headedness.   Hematological:  Does not bruise/bleed easily.   Psychiatric/Behavioral:  The patient is not nervous/anxious.         PMHX:  has a past medical history of Acute superficial gastritis without hemorrhage (10/21/2021), Anxiety, Aortic aneurysm (11/04/2020), Cervical disc disorder, Chest pain in adult (10/21/2021), Depression, History of transient ischemic attack (TIA) (11/04/2020), Hyperlipidemia, Hypertension, Stroke, Thoracic aortic aneurysm, and Vitamin deficiency.    PSHX:  has a past surgical history that includes Cholecystectomy; Carpal tunnel release (12/21/2017); and Total Reduction Mammoplasty.    PFHX: family history includes Cervical cancer in her mother; Dementia in her father;  "Diabetes in her mother; Glaucoma in her mother; Hyperlipidemia in her mother; Hypertension in her father and mother; Pancreatic cancer in her mother.    PSlHX:  reports that she has never smoked. She has never used smokeless tobacco. She reports that she does not drink alcohol and does not use drugs.        Review of patient's allergies indicates:   Allergen Reactions    Betamethasone Other (See Comments)     Flushing    Fentanyl Other (See Comments)     Extreme sedation    Hydrochlorothiazide Other (See Comments)     Reaction unknown    Penicillins Hives     Can take rocephin    Promethazine hcl Other (See Comments)     Hallucinations    Sulfa (sulfonamide antibiotics) Itching    Hydromorphone hcl Palpitations    Latex Rash    Morphine Palpitations       Medication List with Changes/Refills   Current Medications    ERGOCALCIFEROL (ERGOCALCIFEROL) 50,000 UNIT CAP    TAKE 1 CAPSULE BY MOUTH TWICE WEEKLY AS DIRECTED    LISINOPRIL (PRINIVIL,ZESTRIL) 5 MG TABLET    TAKE 1 TABLET BY MOUTH ONCE DAILY    MECOBALAMIN, VITAMIN B12, 1,000 MCG TBDL    DISSOLVE 1 TABLET on the tongue ONCE DAILY    ONDANSETRON (ZOFRAN-ODT) 4 MG TBDL    Take 1 tablet (4 mg total) by mouth every 8 (eight) hours as needed (nausea).    PANTOPRAZOLE (PROTONIX) 40 MG TABLET    Take 1 tablet (40 mg total) by mouth once daily.    PROPRANOLOL (INDERAL) 10 MG TABLET    Take 1 tablet (10 mg total) by mouth 3 (three) times daily.        Objective Findings:  Vital Signs:  BP (!) 114/56   Pulse 72   Ht 5' 4" (1.626 m)   Wt 122.7 kg (270 lb 9.6 oz)   LMP  (LMP Unknown)   SpO2 (!) 94%   BMI 46.45 kg/m²  Body mass index is 46.45 kg/m².    Physical Exam:  Physical Exam  Vitals and nursing note reviewed.   Constitutional:       General: She is not in acute distress.     Appearance: Normal appearance. She is obese.   HENT:      Mouth/Throat:      Mouth: Mucous membranes are moist.   Cardiovascular:      Rate and Rhythm: Normal rate.   Pulmonary:      Breath " sounds: No wheezing, rhonchi or rales.   Abdominal:      General: Bowel sounds are normal. There is no distension.      Palpations: Abdomen is soft. There is no mass.      Tenderness: There is no abdominal tenderness. There is no guarding or rebound.      Hernia: No hernia is present.   Musculoskeletal:      Right lower leg: No edema.      Left lower leg: No edema.   Skin:     General: Skin is warm and dry.      Coloration: Skin is not jaundiced or pale.      Findings: No bruising.   Neurological:      Mental Status: She is alert and oriented to person, place, and time.   Psychiatric:         Mood and Affect: Mood normal.        Labs:  Lab Results   Component Value Date    WBC 9.22 09/11/2022    HGB 12.9 09/11/2022    HCT 38.3 09/11/2022    MCV 90.3 09/11/2022    RDW 12.5 09/11/2022     09/11/2022    LYMPH 22.6 (L) 09/11/2022    LYMPH 2.08 09/11/2022    MONO 6.9 (H) 09/11/2022    EOS 0.14 09/11/2022    BASO 0.08 09/11/2022     Lab Results   Component Value Date     09/11/2022    K 4.2 09/11/2022     09/11/2022    CO2 28 09/11/2022    GLU 94 09/11/2022    BUN 13 09/11/2022    CREATININE 0.79 09/11/2022    CALCIUM 9.6 09/11/2022    PROT 7.3 09/11/2022    ALBUMIN 3.9 09/11/2022    BILITOT 0.3 09/11/2022    ALKPHOS 87 09/11/2022    AST 20 09/11/2022    ALT 31 09/11/2022         Imaging: X-Ray Chest AP Portable    Result Date: 9/11/2022  EXAMINATION: XR CHEST AP PORTABLE CLINICAL HISTORY: Unspecified foreign body in respiratory tract, part unspecified causing other injury, initial encounter TECHNIQUE: Single frontal view of the chest was performed. COMPARISON: 08/12/2022 FINDINGS: Heart size enlarged and similar.  Lungs clear. No pneumothorax or pleural effusion.     No acute findings. Electronically signed by: Po Valente Date:    09/11/2022 Time:    13:19        Assessment:  Mally Woods is a 57 y.o. female here with:  1. Gastroesophageal reflux disease, unspecified whether esophagitis present    2.  Fatty liver          Recommendations:  1. Continue Protonix  2. Avoid spicy, greasy foods  Avoid caffeine, citric acid, chocolate, peppermint, and carbonated drinks  Do not lay down within 3 hours of eating  Exercise 150 minutes per week  Increase fluid to 64 ounces daily  Avoid antiinflammatory medications such as motrin, advil, aleve, ibuprofen, and BC powder  3.Weight loss of 7-10%. Weight loss should be gradual  Diet low in saturated fats and carbohydrates  Good glucose and cholesterol control  4. Repeat liver ultrasound and lab in 6 months        Follow up in about 6 months (around 3/19/2023).      Order summary:  Orders Placed This Encounter    US Abdomen Limited    CBC Auto Differential    Comprehensive Metabolic Panel       Thank you for allowing me to participate in the care of Mally Woods.      JOSE Melgar

## 2022-09-23 ENCOUNTER — CLINICAL SUPPORT (OUTPATIENT)
Dept: REHABILITATION | Facility: HOSPITAL | Age: 57
End: 2022-09-23
Payer: COMMERCIAL

## 2022-09-23 DIAGNOSIS — R29.898 DECREASED RANGE OF MOTION OF NECK: ICD-10-CM

## 2022-09-23 DIAGNOSIS — M47.812 CERVICAL FACET SYNDROME: Primary | ICD-10-CM

## 2022-09-23 PROCEDURE — 97035 APP MDLTY 1+ULTRASOUND EA 15: CPT | Mod: PN,CQ

## 2022-09-23 PROCEDURE — 97014 ELECTRIC STIMULATION THERAPY: CPT | Mod: PN,CQ

## 2022-09-23 PROCEDURE — 97110 THERAPEUTIC EXERCISES: CPT | Mod: PN,CQ

## 2022-09-23 NOTE — PROGRESS NOTES
Physical Therapy Treatment Note     Name: Mally Woods  Clinic Number: [id number]    Therapy Diagnosis:   Encounter Diagnoses   Name Primary?    Cervical facet syndrome Yes    Decreased range of motion of neck      Physician: Rebeca Walter FNP    Visit Date: 9/23/2022    Physician Orders: PT Eval and Treat neck pain  Medical Diagnosis from Referral:M47.812 (ICD-10-CM) - Cervical facet syndrome   Evaluation Date: 8/19/2022  Authorization Period Expiration: 12/30/2022  Plan of Care Expiration: 9/18/22  Visit # / Visits authorized: 5/ 20  PTA Visit #: 2    Time In: 238  Time Out: 338  Total Billable Time: 60 minutes    Precautions: Standard    Subjective     Pt reports: I'm a little stiff  She was compliant with home exercise program.  Response to previous treatment: no c/o    Pain: 3/10  Location: bilateral neck      Objective     Mally received therapeutic exercises to develop strength, ROM, flexibility, and posture for 32 minutes including:    UBE x 4 minutes   Pulleys x 4  minutes   Doorway pec stretching with bilateral rotation x10 second hold   Bilateral side lying head lifts x 10  Supine head lifts x 10   Sitting kathols red theraband x 10  Sitting bilateral tilt and extension manual resisted exercises x 10  Bilateral scapular retraction with rows, extension x 15 repetitions each  Cervical stretches: rotation, lateral flexion, levator scapulae bilaterally at 10 second hold x 6 repetitions each  Bilateral sternocleidomastoid stretch x 10 repetitions       Cervical range of motion measures:    Rotation Right 50 degrees  Left 55 degrees    Lateral tilt Right 40 degrees Left 40 degrees      Mally received the following direct contact modalities after being cleared for contraindications: Ultrasound:  Mally received ultrasound to manage pain and inflammation at 100 % duty cycle applied to the bilateral cervical paraspinals at an intensity of  1.5 W/cm2  for a duration of 8 minutes. Patient tolerated treatment  well without adverse effects. Therapist was in attendance throughout intervention.    Pt received ifc to upper back and neck at an intensity of 10 x 20 min with mhp . Pt had no adverse reaction with heat or estim.      Home Exercises Provided and Patient Education Provided     Education provided: continue current home exercise program    Written Home Exercises Provided: Patient instructed to cont prior HEP.  Exercises were reviewed and Mally was able to demonstrate them prior to the end of the session.  Mally demonstrated good  understanding of the education provided.     See EMR under Patient Instructions for exercises provided prior visit.    Assessment     Pt voicing decreased pain after treatment 1/10 after treatment, patient has decreased muscle tightness along levator scapulae muscles.  Mally Is progressing well towards her goals.   Pt prognosis is Good.     Pt will continue to benefit from skilled outpatient physical therapy to address the deficits listed in the problem list box on initial evaluation, provide pt/family education and to maximize pt's level of independence in the home and community environment.     Anticipated barriers to physical therapy: home exercise program compliance    Goals:  1. Patient will be independent with home exercise program-met  2. Patient will report decrease in pain at worse to 3/10 to improve quality of life-met  3. Patient will hold correct posture independently for 1 minute to improve ergonomic positioning at work    Plan     Plan of care Certification: 8/19/2022 to 10/14/2022.     Outpatient Physical Therapy 2 times weekly for 8 weeks to include the following interventions: Cervical/Lumbar Traction, Electrical Stimulation ifc/premod, Manual Therapy, Moist Heat/ Ice, Neuromuscular Re-ed, Patient Education, Therapeutic Activities, Therapeutic Exercise and Ultrasound.     Continue per Plan of Care   Rosalinda Segura, PTA  9/23/2022

## 2022-09-26 NOTE — PLAN OF CARE
Physical Therapy Treatment Note      Name: Mally Woods  Clinic Number: [id number]     Therapy Diagnosis:        Encounter Diagnoses   Name Primary?    Cervical facet syndrome Yes    Decreased range of motion of neck        Physician: Rebeca Walter FNP     Visit Date: 9/16/2022     Physician Orders: PT Eval and Treat neck pain  Medical Diagnosis from Referral:M47.812 (ICD-10-CM) - Cervical facet syndrome   Evaluation Date: 8/19/2022  Authorization Period Expiration: 12/30/2022  Plan of Care Expiration: 10/14/2022  Visit # / Visits authorized: 4/ 20  PTA Visit #: 1     Time In: 232  Time Out: 325  Total Billable Time: 53 minutes     Precautions: Standard     Received Plan of Care per Po Diaz PT      Subjective      Pt reports: doing ok today  She was compliant with home exercise program.  Response to previous treatment: no c/o     Pain: 3/10  Location: bilateral neck       Objective      Mally received therapeutic exercises to develop strength, ROM, flexibility, and posture for 23 minutes including:     UBE x 4 minutes   Pulleys x 4  minutes   Doorway pec stretching with bilateral rotation x10 second hold   Bilateral side lying head lifts x 10  Supine head lifts x 10   Bilateral scapular retraction with rows, extension x 15 repetitions each  Cervical stretches: rotation, lateral flexion, levator scapulae bilaterally at 10 second hold x 6 repetitions each  Bilateral sternocleidomastoid stretch x 10 reps with manual hold .      Cervical range of motion measures:    Rotation           Right 50 degrees        Left 55 degrees             Lateral tilt        Right 40 degrees        Left 40 degrees       Mally received the following direct contact modalities after being cleared for contraindications: Ultrasound:  Mally received ultrasound to manage pain and inflammation at 100 % duty cycle applied to the bilateral cervical paraspinals at an intensity of  1.5 W/cm2  for a duration of 8 minutes. Patient tolerated  treatment well without adverse effects. Therapist was in attendance throughout intervention.     Pt received ifc to upper back and neck at an intensity of 9 x 20 min with mhp . Pt had no adverse reaction with heat or estim.       Home Exercises Provided and Patient Education Provided      Education provided: continue current home exercise program     Written Home Exercises Provided: Patient instructed to cont prior HEP.  Exercises were reviewed and Mally was able to demonstrate them prior to the end of the session.  Mally demonstrated good  understanding of the education provided.      See EMR under Patient Instructions for exercises provided prior visit.     Assessment      Pt voicing decreased pain after treatment 1/10 after treatment.  Mally Is progressing well towards her goals.   Pt prognosis is Good.      Pt will continue to benefit from skilled outpatient physical therapy to address the deficits listed in the problem list box on initial evaluation, provide pt/family education and to maximize pt's level of independence in the home and community environment.      Anticipated barriers to physical therapy: home exercise program compliance     Goals:  1. Patient will be independent with home exercise program  2. Patient will report decrease in pain at worse to 3/10 to improve quality of life  3. Patient will hold correct posture independently for 1 minute to improve ergonomic positioning at work     Plan   Reasons for Recertification of Therapy: cont PT     Plan     Updated Certification Period: 9/26/2022 to 10/25/2022  Recommended Treatment Plan: 2 times per week for 4 weeks: Therapeutic Exercise and modalities as needed   Other Recommendations: cont PT     Plan of care has been reestablished with Lucia TAVAREZ and Alisson TAVAREZ.      Po Diaz, PT  9/26/2022      I CERTIFY THE NEED FOR THESE SERVICES FURNISHED UNDER THIS PLAN OF TREATMENT AND WHILE UNDER MY CARE.    Physician's  comments:      Physician's Signature: ___________________________________________________

## 2022-09-30 ENCOUNTER — CLINICAL SUPPORT (OUTPATIENT)
Dept: REHABILITATION | Facility: HOSPITAL | Age: 57
End: 2022-09-30
Payer: COMMERCIAL

## 2022-09-30 DIAGNOSIS — M47.812 CERVICAL FACET SYNDROME: ICD-10-CM

## 2022-09-30 DIAGNOSIS — R29.898 DECREASED RANGE OF MOTION OF NECK: Primary | ICD-10-CM

## 2022-09-30 PROCEDURE — 97110 THERAPEUTIC EXERCISES: CPT | Mod: PN

## 2022-09-30 PROCEDURE — 97035 APP MDLTY 1+ULTRASOUND EA 15: CPT | Mod: PN

## 2022-09-30 NOTE — PROGRESS NOTES
Physical Therapy Treatment Note     Name: Mally Woods  Clinic Number: [id number]    Therapy Diagnosis:   Encounter Diagnoses   Name Primary?    Decreased range of motion of neck Yes    Cervical facet syndrome      Physician: Rebeca Walter FNP    Visit Date: 9/30/2022    Physician Orders: PT Eval and Treat neck pain  Medical Diagnosis from Referral:M47.812 (ICD-10-CM) - Cervical facet syndrome   Evaluation Date: 8/19/2022  Authorization Period Expiration: 12/30/2022  Plan of Care Expiration: 10/26/2022  Visit # / Visits authorized: 6/ 20  PTA Visit #:     Time In: 1440  Time Out: 1535  Total Billable Time:55 minutes    Precautions: Standard    Subjective     Pt reports: Im getting somewhat better   She was compliant with home exercise program.  Response to previous treatment: no c/o    Pain: 3/10  Location: bilateral neck      Objective     Mally received therapeutic exercises to develop strength, ROM, flexibility, and posture for 32 minutes including:    UBE x 4 minutes   Pulleys x 4  minutes   Doorway pec stretching with bilateral rotation x10 second hold   Bilateral side lying head lifts x 10  Supine head lifts x 10   Sitting kathols red theraband x 10  Sitting bilateral tilt and extension manual resisted exercises x 10  Bilateral scapular retraction with rows, extension x 15 repetitions each  Cervical stretches: rotation, lateral flexion, levator scapulae bilaterally at 10 second hold x 6 repetitions each  Bilateral sternocleidomastoid stretch x 10 repetitions       Cervical range of motion measures:    Rotation Right 50 degrees  Left 55 degrees    Lateral tilt Right 40 degrees Left 40 degrees      Mally received the following direct contact modalities after being cleared for contraindications: Ultrasound:  Mally received ultrasound to manage pain and inflammation at 100 % duty cycle applied to the bilateral cervical paraspinals at an intensity of  1.5 W/cm2  for a duration of 8 minutes. Patient  tolerated treatment well without adverse effects. Therapist was in attendance throughout intervention.    Pt received ifc to upper back and neck at an intensity of 10 x 20 min with mhp . Pt had no adverse reaction with heat or estim.      Home Exercises Provided and Patient Education Provided     Education provided: continue current home exercise program    Written Home Exercises Provided: Patient instructed to cont prior HEP.  Exercises were reviewed and Mally was able to demonstrate them prior to the end of the session.  Mally demonstrated good  understanding of the education provided.     See EMR under Patient Instructions for exercises provided prior visit.    Assessment     Pt voicing decreased pain after treatment 1/10 after treatment, patient has decreased muscle tightness along levator scapulae muscles.  Mally Is progressing well towards her goals.   Pt prognosis is Good.     Pt will continue to benefit from skilled outpatient physical therapy to address the deficits listed in the problem list box on initial evaluation, provide pt/family education and to maximize pt's level of independence in the home and community environment.     Anticipated barriers to physical therapy: home exercise program compliance    Goals:  1. Patient will be independent with home exercise program-met  2. Patient will report decrease in pain at worse to 3/10 to improve quality of life-met  3. Patient will hold correct posture independently for 1 minute to improve ergonomic positioning at work    Plan     Plan of care Certification: 8/19/2022 to 10/14/2022.     Outpatient Physical Therapy 2 times weekly for 8 weeks to include the following interventions: Cervical/Lumbar Traction, Electrical Stimulation ifc/premod, Manual Therapy, Moist Heat/ Ice, Neuromuscular Re-ed, Patient Education, Therapeutic Activities, Therapeutic Exercise and Ultrasound.     Continue per Plan of Care   Po Diaz, PT  9/30/2022

## 2022-10-07 ENCOUNTER — CLINICAL SUPPORT (OUTPATIENT)
Dept: REHABILITATION | Facility: HOSPITAL | Age: 57
End: 2022-10-07
Payer: COMMERCIAL

## 2022-10-07 DIAGNOSIS — M47.812 CERVICAL FACET SYNDROME: ICD-10-CM

## 2022-10-07 DIAGNOSIS — R29.898 DECREASED RANGE OF MOTION OF NECK: Primary | ICD-10-CM

## 2022-10-07 PROCEDURE — 97110 THERAPEUTIC EXERCISES: CPT | Mod: PN

## 2022-10-07 PROCEDURE — 97035 APP MDLTY 1+ULTRASOUND EA 15: CPT | Mod: PN

## 2022-10-07 PROCEDURE — 97014 ELECTRIC STIMULATION THERAPY: CPT | Mod: PN

## 2022-10-07 NOTE — PROGRESS NOTES
Physical Therapy Treatment Note     Name: Mally Woods  Clinic Number: [id number]    Therapy Diagnosis:   Encounter Diagnoses   Name Primary?    Decreased range of motion of neck Yes    Cervical facet syndrome      Physician: Rebeca Walter FNP    Visit Date: 10/7/2022    Physician Orders: PT Eval and Treat neck pain  Medical Diagnosis from Referral:M47.812 (ICD-10-CM) - Cervical facet syndrome   Evaluation Date: 8/19/2022  Authorization Period Expiration: 12/30/2022  Plan of Care Expiration: 10/26/2022  Visit # / Visits authorized: 7/ 20  PTA Visit #:     Time In: 1429  Time Out: 1535  Total Billable Time:55 minutes    Precautions: Standard    Subjective     Pt reports: put off getting a nerve injection she is doing so much better   She was compliant with home exercise program.  Response to previous treatment: no c/o    Pain: 0/10  Location: bilateral neck      Objective     Mally received therapeutic exercises to develop strength, ROM, flexibility, and posture for 32 minutes including:    UBE x 4 minutes   Pulleys x 4  minutes   Doorway pec stretching with bilateral rotation x10 second hold   Bilateral side lying head lifts x 10  Supine head lifts x 10   Sitting kathols red theraband x 10  Sitting bilateral tilt and extension manual resisted exercises x 10  Bilateral scapular retraction with rows, extension x 15 repetitions each  Cervical stretches: rotation, lateral flexion, levator scapulae bilaterally at 10 second hold x 6 repetitions each  Bilateral sternocleidomastoid stretch x 10 repetitions       Cervical range of motion measures:    Rotation Right 50 degrees  Left 55 degrees    Lateral tilt Right 45 degrees Left 45 degrees      Mally received the following direct contact modalities after being cleared for contraindications: Ultrasound:  Mally received ultrasound to manage pain and inflammation at 100 % duty cycle applied to the bilateral cervical paraspinals at an intensity of  1.5 W/cm2  for a  duration of 8 minutes. Patient tolerated treatment well without adverse effects. Therapist was in attendance throughout intervention.    Pt received ifc to upper back and neck at an intensity of 10 x 20 min with mhp . Pt had no adverse reaction with heat or estim.      Home Exercises Provided and Patient Education Provided     Education provided: continue current home exercise program    Written Home Exercises Provided: Patient instructed to cont prior HEP.  Exercises were reviewed and Mally was able to demonstrate them prior to the end of the session.  Mally demonstrated good  understanding of the education provided.     See EMR under Patient Instructions for exercises provided prior visit.    Assessment     Pt voicing decreased pain after treatment 1/10 after treatment, patient has decreased muscle tightness along levator scapulae muscles.  Mally Is progressing well towards her goals.   Pt prognosis is Good.     Pt will continue to benefit from skilled outpatient physical therapy to address the deficits listed in the problem list box on initial evaluation, provide pt/family education and to maximize pt's level of independence in the home and community environment.     Anticipated barriers to physical therapy: home exercise program compliance    Goals:  1. Patient will be independent with home exercise program-met  2. Patient will report decrease in pain at worse to 3/10 to improve quality of life-met  3. Patient will hold correct posture independently for 1 minute to improve ergonomic positioning at work    Plan     Plan of care Certification: 8/19/2022 to 10/14/2022.     Outpatient Physical Therapy 2 times weekly for 8 weeks to include the following interventions: Cervical/Lumbar Traction, Electrical Stimulation ifc/premod, Manual Therapy, Moist Heat/ Ice, Neuromuscular Re-ed, Patient Education, Therapeutic Activities, Therapeutic Exercise and Ultrasound.     Continue per Plan of Care   Po Diaz  PT  10/7/2022

## 2022-10-14 ENCOUNTER — CLINICAL SUPPORT (OUTPATIENT)
Dept: REHABILITATION | Facility: HOSPITAL | Age: 57
End: 2022-10-14
Payer: COMMERCIAL

## 2022-10-14 DIAGNOSIS — R29.898 DECREASED RANGE OF MOTION OF NECK: Primary | ICD-10-CM

## 2022-10-14 DIAGNOSIS — M47.812 CERVICAL FACET SYNDROME: ICD-10-CM

## 2022-10-14 PROCEDURE — 97110 THERAPEUTIC EXERCISES: CPT | Mod: PN,CQ

## 2022-10-14 PROCEDURE — 97035 APP MDLTY 1+ULTRASOUND EA 15: CPT | Mod: PN,CQ

## 2022-10-14 PROCEDURE — 97014 ELECTRIC STIMULATION THERAPY: CPT | Mod: PN,CQ

## 2022-10-14 NOTE — PROGRESS NOTES
Physical Therapy Treatment Note     Name: Mally Woods  Clinic Number: [id number]    Therapy Diagnosis:   Encounter Diagnoses   Name Primary?    Decreased range of motion of neck Yes    Cervical facet syndrome      Physician: Rebeca Walter FNP    Visit Date: 10/14/2022    Physician Orders: PT Eval and Treat neck pain  Medical Diagnosis from Referral:M47.812 (ICD-10-CM) - Cervical facet syndrome   Evaluation Date: 8/19/2022  Authorization Period Expiration: 12/30/2022  Plan of Care Expiration: 10/26/2022  Visit # / Visits authorized: 8/ 20  PTA Visit #: 1    Time In: 238  Time Out: 328  Total Billable Time:50 minutes    Precautions: Standard    Subjective     Pt reports: the right side of my neck hurts more than left today  She was compliant with home exercise program.  Response to previous treatment: no c/o    Pain: 1/10  Location: bilateral neck      Objective     Mally received therapeutic exercises to develop strength, ROM, flexibility, and posture for 27 minutes including:    UBE x 4 minutes   Pulleys x 4  minutes   Doorway pec stretching with bilateral rotation x 10 second hold   Bilateral side lying head lifts x 10  Supine head lifts x 10   Sitting kathols red theraband x 10  Sitting bilateral tilt and extension manual resisted exercises x 10  Bilateral scapular retraction with rows, extension x 15 repetitions each  Cervical stretches: rotation, lateral flexion, levator scapulae bilaterally at 10 second hold x 6 repetitions each  Bilateral sternocleidomastoid stretch x 10 repetitions     Cervical range of motion measures:    Rotation Right 52 degrees  Left 55 degrees    Lateral tilt Right 40 degrees Left 40 degrees      Mally received the following direct contact modalities after being cleared for contraindications: Ultrasound:  Mally received ultrasound to manage pain and inflammation at 100 % duty cycle applied to the bilateral cervical paraspinals at an intensity of  1.5 W/cm2  for a duration of 8  minutes. Patient tolerated treatment well without adverse effects. Therapist was in attendance throughout intervention.    Pt received ifc to middle back and neck at an intensity of 10 x 15 min with mhp . Pt had no adverse reaction with heat or estim.      Home Exercises Provided and Patient Education Provided     Education provided: continue current home exercise program    Written Home Exercises Provided: Patient instructed to cont prior HEP.  Exercises were reviewed and Mally was able to demonstrate them prior to the end of the session.  Mally demonstrated good  understanding of the education provided.     See EMR under Patient Instructions for exercises provided prior visit.    Assessment   Patient voicing decreased pain 0/10 after treatment, had had difficulty with tilt range of motion this treatment   Mally Is progressing well towards her goals.   Pt prognosis is Good.     Pt will continue to benefit from skilled outpatient physical therapy to address the deficits listed in the problem list box on initial evaluation, provide pt/family education and to maximize pt's level of independence in the home and community environment.     Anticipated barriers to physical therapy: home exercise program compliance    Goals:  1. Patient will be independent with home exercise program-met  2. Patient will report decrease in pain at worse to 3/10 to improve quality of life-met  3. Patient will hold correct posture independently for 1 minute to improve ergonomic positioning at work    Plan     Plan of care Certification: 8/19/2022 to 10/14/2022.     Outpatient Physical Therapy 2 times weekly for 8 weeks to include the following interventions: Cervical/Lumbar Traction, Electrical Stimulation ifc/premod, Manual Therapy, Moist Heat/ Ice, Neuromuscular Re-ed, Patient Education, Therapeutic Activities, Therapeutic Exercise and Ultrasound.     Continue per Plan of Care   Rosalinda Segura, PTA  10/14/2022

## 2022-10-21 ENCOUNTER — CLINICAL SUPPORT (OUTPATIENT)
Dept: REHABILITATION | Facility: HOSPITAL | Age: 57
End: 2022-10-21
Payer: COMMERCIAL

## 2022-10-21 DIAGNOSIS — M47.812 CERVICAL FACET SYNDROME: ICD-10-CM

## 2022-10-21 DIAGNOSIS — R29.898 DECREASED RANGE OF MOTION OF NECK: Primary | ICD-10-CM

## 2022-10-21 PROCEDURE — 97035 APP MDLTY 1+ULTRASOUND EA 15: CPT | Mod: PN

## 2022-10-21 PROCEDURE — 97014 ELECTRIC STIMULATION THERAPY: CPT | Mod: PN

## 2022-10-21 PROCEDURE — 97110 THERAPEUTIC EXERCISES: CPT | Mod: PN

## 2022-10-21 NOTE — PROGRESS NOTES
Physical Therapy Treatment Note     Name: Mally Woods  Clinic Number: [id number]    Therapy Diagnosis:   Encounter Diagnoses   Name Primary?    Decreased range of motion of neck Yes    Cervical facet syndrome      Physician: Rebeca Walter FNP    Visit Date: 10/21/2022    Physician Orders: PT Eval and Treat neck pain  Medical Diagnosis from Referral:M47.812 (ICD-10-CM) - Cervical facet syndrome   Evaluation Date: 8/19/2022  Authorization Period Expiration: 12/30/2022  Plan of Care Expiration: 10/26/2022  Visit # / Visits authorized: 9/ 20  PTA Visit #:     Time In: 1435  Time Out: 1525  Total Billable Time:50 minutes    Precautions: Standard    Subjective     Pt reports: left side neck and shoulder is worse today .   She was compliant with home exercise program.  Response to previous treatment: no c/o    Pain: 5/10  Location: bilateral neck      Objective     Mally received therapeutic exercises to develop strength, ROM, flexibility, and posture for 27 minutes including:    UBE x 4 minutes   Pulleys x 4  minutes   Doorway pec stretching with bilateral rotation x 10 second hold   Bilateral side lying head lifts x 10  Supine head lifts x 10   Sitting kathols red theraband x 10  Sitting bilateral tilt and extension manual resisted exercises x 10  Bilateral scapular retraction with rows, extension x 15 repetitions each  Cervical stretches: rotation, lateral flexion, levator scapulae bilaterally at 10 second hold x 6 repetitions each  Bilateral sternocleidomastoid stretch x 10 repetitions     Cervical range of motion measures:    Rotation Right 58 degrees  Left 58 degrees    Lateral tilt Right 40 degrees Left 40 degrees      Mally received the following direct contact modalities after being cleared for contraindications: Ultrasound:  Mally received ultrasound to manage pain and inflammation at 100 % duty cycle applied to the bilateral cervical paraspinals at an intensity of  1.8 W/cm2  for a duration of 8  minutes. Patient tolerated treatment well without adverse effects. Therapist was in attendance throughout intervention.    Pt received ifc to middle back and neck at an intensity of 10 x 15 min with mhp . Pt had no adverse reaction with heat or estim.      Home Exercises Provided and Patient Education Provided     Education provided: continue current home exercise program    Written Home Exercises Provided: Patient instructed to cont prior HEP.  Exercises were reviewed and Mally was able to demonstrate them prior to the end of the session.  Mally demonstrated good  understanding of the education provided.     See EMR under Patient Instructions for exercises provided prior visit.    Assessment   Patient voicing decreased pain post visit   Mally Is progressing well towards her goals.   Pt prognosis is Good.     Pt will continue to benefit from skilled outpatient physical therapy to address the deficits listed in the problem list box on initial evaluation, provide pt/family education and to maximize pt's level of independence in the home and community environment.     Anticipated barriers to physical therapy: home exercise program compliance    Goals:  1. Patient will be independent with home exercise program-met  2. Patient will report decrease in pain at worse to 3/10 to improve quality of life-met  3. Patient will hold correct posture independently for 1 minute to improve ergonomic positioning at work    Plan     Plan of care Certification: 8/19/2022 to 10/14/2022.     Outpatient Physical Therapy 2 times weekly for 8 weeks to include the following interventions: Cervical/Lumbar Traction, Electrical Stimulation ifc/premod, Manual Therapy, Moist Heat/ Ice, Neuromuscular Re-ed, Patient Education, Therapeutic Activities, Therapeutic Exercise and Ultrasound.     Continue per Plan of Care   Po Diaz, PT  10/21/2022

## 2022-10-21 NOTE — PLAN OF CARE
Physical Therapy Treatment Note      Name: Mally Woods  Clinic Number: [id number]     Therapy Diagnosis:        Encounter Diagnoses   Name Primary?    Decreased range of motion of neck Yes    Cervical facet syndrome        Physician: Rebeca Walter FNP     Visit Date: 10/21/2022     Physician Orders: PT Eval and Treat neck pain  Medical Diagnosis from Referral:M47.812 (ICD-10-CM) - Cervical facet syndrome   Evaluation Date: 8/19/2022  Authorization Period Expiration: 12/30/2022  Plan of Care Expiration: 10/26/2022  Visit # / Visits authorized: 9/ 20  PTA Visit #:      Time In: 1435  Time Out: 1525  Total Billable Time:50 minutes     Precautions: Standard     Subjective      Pt reports: left side neck and shoulder is worse today .   She was compliant with home exercise program.  Response to previous treatment: no c/o     Pain: 5/10  Location: bilateral neck       Objective      Mally received therapeutic exercises to develop strength, ROM, flexibility, and posture for 27 minutes including:     UBE x 4 minutes   Pulleys x 4  minutes   Doorway pec stretching with bilateral rotation x 10 second hold   Bilateral side lying head lifts x 10  Supine head lifts x 10   Sitting kathols red theraband x 10  Sitting bilateral tilt and extension manual resisted exercises x 10  Bilateral scapular retraction with rows, extension x 15 repetitions each  Cervical stretches: rotation, lateral flexion, levator scapulae bilaterally at 10 second hold x 6 repetitions each  Bilateral sternocleidomastoid stretch x 10 repetitions      Cervical range of motion measures:    Rotation           Right 58 degrees        Left 58 degrees             Lateral tilt        Right 40 degrees        Left 40 degrees       Mally received the following direct contact modalities after being cleared for contraindications: Ultrasound:  Mally received ultrasound to manage pain and inflammation at 100 % duty cycle applied to the bilateral cervical paraspinals  at an intensity of  1.8 W/cm2  for a duration of 8 minutes. Patient tolerated treatment well without adverse effects. Therapist was in attendance throughout intervention.     Pt received ifc to middle back and neck at an intensity of 10 x 15 min with mhp . Pt had no adverse reaction with heat or estim.       Home Exercises Provided and Patient Education Provided      Education provided: continue current home exercise program     Written Home Exercises Provided: Patient instructed to cont prior HEP.  Exercises were reviewed and Mally was able to demonstrate them prior to the end of the session.  Mally demonstrated good  understanding of the education provided.      See EMR under Patient Instructions for exercises provided prior visit.     Assessment   Patient voicing decreased pain post visit   Mally Is progressing well towards her goals.   Pt prognosis is Good.      Pt will continue to benefit from skilled outpatient physical therapy to address the deficits listed in the problem list box on initial evaluation, provide pt/family education and to maximize pt's level of independence in the home and community environment.      Anticipated barriers to physical therapy: home exercise program compliance     Goals:  1. Patient will be independent with home exercise program-met  2. Patient will report decrease in pain at worse to 3/10 to improve quality of life-met  3. Patient will hold correct posture independently for 1 minute to improve ergonomic positioning at work     Plan    Reasons for Recertification of Therapy: cont PT    Plan     Updated Certification Period: 10/21/2022 to 11/20/2022  Recommended Treatment Plan: 2 times per week for 4 weeks: Neuromuscular Re-ed, Patient Education, Therapeutic Exercise, and modalalitites as needed  Other Recommendations: cont PT    Po Diaz, PT  10/21/2022      I CERTIFY THE NEED FOR THESE SERVICES FURNISHED UNDER THIS PLAN OF TREATMENT AND WHILE UNDER MY CARE.    Physician's  comments:      Physician's Signature: ___________________________________________________

## 2022-10-25 ENCOUNTER — CLINICAL SUPPORT (OUTPATIENT)
Dept: REHABILITATION | Facility: HOSPITAL | Age: 57
End: 2022-10-25
Payer: COMMERCIAL

## 2022-10-25 DIAGNOSIS — R29.898 DECREASED RANGE OF MOTION OF NECK: Primary | ICD-10-CM

## 2022-10-25 DIAGNOSIS — M47.812 CERVICAL FACET SYNDROME: ICD-10-CM

## 2022-10-25 PROCEDURE — 97014 ELECTRIC STIMULATION THERAPY: CPT | Mod: PN

## 2022-10-25 PROCEDURE — 97035 APP MDLTY 1+ULTRASOUND EA 15: CPT | Mod: PN

## 2022-10-25 PROCEDURE — 97110 THERAPEUTIC EXERCISES: CPT | Mod: PN

## 2022-10-25 NOTE — PROGRESS NOTES
Physical Therapy Treatment Note     Name: Mally Woods  Clinic Number: [id number]    Therapy Diagnosis:   Encounter Diagnoses   Name Primary?    Decreased range of motion of neck Yes    Cervical facet syndrome      Physician: Rebeca Walter FNP    Visit Date: 10/25/2022    Physician Orders: PT Eval and Treat neck pain  Medical Diagnosis from Referral:M47.812 (ICD-10-CM) - Cervical facet syndrome   Evaluation Date: 8/19/2022  Authorization Period Expiration: 12/30/2022  Plan of Care Expiration: 11/20/2022  Visit # / Visits authorized: 10/ 20  PTA Visit #:     Time In: 1435  Time Out: 1525  Total Billable Time:50 minutes    Precautions: Standard    Subjective     Pt reports: left side neck and shoulder is worse today .   She was compliant with home exercise program.  Response to previous treatment: no c/o    Pain: 1/10  Location: bilateral neck      Objective     Mally received therapeutic exercises to develop strength, ROM, flexibility, and posture for 27 minutes including:    UBE x 4 minutes   Pulleys x 4  minutes   Doorway pec stretching with bilateral rotation x 10 second hold   Bilateral side lying head lifts x 10  Supine head lifts x 10   Sitting kathols red theraband x 10  Sitting bilateral tilt and extension manual resisted exercises x 10  Bilateral scapular retraction with rows, extension x 15 repetitions each  Cervical stretches: rotation, lateral flexion, levator scapulae bilaterally at 10 second hold x 6 repetitions each  Bilateral sternocleidomastoid stretch x 10 repetitions     Cervical range of motion measures:    Rotation Right 58 degrees  Left 58 degrees    Lateral tilt Right 40 degrees Left 40 degrees      Mally received the following direct contact modalities after being cleared for contraindications: Ultrasound:  Mally received ultrasound to manage pain and inflammation at 100 % duty cycle applied to the bilateral cervical paraspinals at an intensity of  1.8 W/cm2  for a duration of 8  minutes. Patient tolerated treatment well without adverse effects. Therapist was in attendance throughout intervention.    Pt received ifc to middle back and neck at an intensity of 10 x 15 min with mhp . Pt had no adverse reaction with heat or estim.      Home Exercises Provided and Patient Education Provided     Education provided: continue current home exercise program    Written Home Exercises Provided: Patient instructed to cont prior HEP.  Exercises were reviewed and Mally was able to demonstrate them prior to the end of the session.  Mally demonstrated good  understanding of the education provided.     See EMR under Patient Instructions for exercises provided prior visit.    Assessment   Patient voicing decreased pain today 1/10  Mally Is progressing well towards her goals.   Pt prognosis is Good.     Pt will continue to benefit from skilled outpatient physical therapy to address the deficits listed in the problem list box on initial evaluation, provide pt/family education and to maximize pt's level of independence in the home and community environment.     Anticipated barriers to physical therapy: home exercise program compliance    Goals:  1. Patient will be independent with home exercise program-met  2. Patient will report decrease in pain at worse to 3/10 to improve quality of life-met  3. Patient will hold correct posture independently for 1 minute to improve ergonomic positioning at work    Plan     Plan of care Certification: 8/19/2022 to 11/20/2022.     Outpatient Physical Therapy 2 times weekly for 8 weeks to include the following interventions: Cervical/Lumbar Traction, Electrical Stimulation ifc/premod, Manual Therapy, Moist Heat/ Ice, Neuromuscular Re-ed, Patient Education, Therapeutic Activities, Therapeutic Exercise and Ultrasound.     Continue per Plan of Care   Po Diaz, PT  10/25/2022

## 2022-11-04 ENCOUNTER — HOSPITAL ENCOUNTER (EMERGENCY)
Facility: HOSPITAL | Age: 57
Discharge: HOME | End: 2022-11-04
Payer: COMMERCIAL

## 2022-11-04 VITALS
HEART RATE: 63 BPM | OXYGEN SATURATION: 96 % | TEMPERATURE: 98 F | WEIGHT: 260 LBS | HEIGHT: 64 IN | RESPIRATION RATE: 15 BRPM | SYSTOLIC BLOOD PRESSURE: 121 MMHG | BODY MASS INDEX: 44.39 KG/M2 | DIASTOLIC BLOOD PRESSURE: 53 MMHG

## 2022-11-04 DIAGNOSIS — I71.9 AORTIC ANEURYSM WITHOUT RUPTURE, UNSPECIFIED PORTION OF AORTA: ICD-10-CM

## 2022-11-04 DIAGNOSIS — S46.912A SHOULDER STRAIN, LEFT, INITIAL ENCOUNTER: ICD-10-CM

## 2022-11-04 DIAGNOSIS — R07.9 CHEST PAIN: Primary | ICD-10-CM

## 2022-11-04 LAB
ALBUMIN SERPL BCP-MCNC: 4.1 G/DL (ref 3.5–5)
ALBUMIN/GLOB SERPL: 1.4 {RATIO}
ALP SERPL-CCNC: 110 U/L (ref 46–118)
ALT SERPL W P-5'-P-CCNC: 30 U/L (ref 13–56)
ANION GAP SERPL CALCULATED.3IONS-SCNC: 15 MMOL/L (ref 7–16)
AST SERPL W P-5'-P-CCNC: 15 U/L (ref 15–37)
BASOPHILS # BLD AUTO: 0.05 K/UL (ref 0–0.2)
BASOPHILS NFR BLD AUTO: 0.6 % (ref 0–1)
BILIRUB SERPL-MCNC: 0.2 MG/DL (ref ?–1.2)
BUN SERPL-MCNC: 16 MG/DL (ref 7–18)
BUN/CREAT SERPL: 17 (ref 6–20)
CALCIUM SERPL-MCNC: 9.2 MG/DL (ref 8.5–10.1)
CHLORIDE SERPL-SCNC: 105 MMOL/L (ref 98–107)
CO2 SERPL-SCNC: 28 MMOL/L (ref 21–32)
CREAT SERPL-MCNC: 0.95 MG/DL (ref 0.55–1.02)
DIFFERENTIAL METHOD BLD: ABNORMAL
EGFR (NO RACE VARIABLE) (RUSH/TITUS): 70 ML/MIN/1.73M²
EOSINOPHIL # BLD AUTO: 0.11 K/UL (ref 0–0.5)
EOSINOPHIL NFR BLD AUTO: 1.2 % (ref 1–4)
ERYTHROCYTE [DISTWIDTH] IN BLOOD BY AUTOMATED COUNT: 11.9 % (ref 11.5–14.5)
GLOBULIN SER-MCNC: 2.9 G/DL (ref 2–4)
GLUCOSE SERPL-MCNC: 85 MG/DL (ref 74–106)
HCT VFR BLD AUTO: 39.1 % (ref 38–47)
HGB BLD-MCNC: 13.2 G/DL (ref 12–16)
IMM GRANULOCYTES # BLD AUTO: 0.03 K/UL (ref 0–0.04)
IMM GRANULOCYTES NFR BLD: 0.3 % (ref 0–0.4)
LYMPHOCYTES # BLD AUTO: 2.08 K/UL (ref 1–4.8)
LYMPHOCYTES NFR BLD AUTO: 23.1 % (ref 27–41)
MCH RBC QN AUTO: 30.3 PG (ref 27–31)
MCHC RBC AUTO-ENTMCNC: 33.8 G/DL (ref 32–36)
MCV RBC AUTO: 89.7 FL (ref 80–96)
MONOCYTES # BLD AUTO: 0.73 K/UL (ref 0–0.8)
MONOCYTES NFR BLD AUTO: 8.1 % (ref 2–6)
MPC BLD CALC-MCNC: 11.2 FL (ref 9.4–12.4)
NEUTROPHILS # BLD AUTO: 6.01 K/UL (ref 1.8–7.7)
NEUTROPHILS NFR BLD AUTO: 66.7 % (ref 53–65)
NRBC # BLD AUTO: 0 X10E3/UL
NRBC, AUTO (.00): 0 %
NT-PROBNP SERPL-MCNC: 10 PG/ML (ref 1–125)
PLATELET # BLD AUTO: 294 K/UL (ref 150–400)
POTASSIUM SERPL-SCNC: 4 MMOL/L (ref 3.5–5.1)
PROT SERPL-MCNC: 7 G/DL (ref 6.4–8.2)
RBC # BLD AUTO: 4.36 M/UL (ref 4.2–5.4)
SODIUM SERPL-SCNC: 144 MMOL/L (ref 136–145)
TROPONIN I SERPL HS-MCNC: 4.2 PG/ML
WBC # BLD AUTO: 9.01 K/UL (ref 4.5–11)

## 2022-11-04 PROCEDURE — 93010 ELECTROCARDIOGRAM REPORT: CPT | Mod: ,,, | Performed by: HOSPITALIST

## 2022-11-04 PROCEDURE — 85025 COMPLETE CBC W/AUTO DIFF WBC: CPT | Performed by: NURSE PRACTITIONER

## 2022-11-04 PROCEDURE — 80053 COMPREHEN METABOLIC PANEL: CPT | Performed by: NURSE PRACTITIONER

## 2022-11-04 PROCEDURE — 93010 EKG 12-LEAD: ICD-10-PCS | Mod: ,,, | Performed by: HOSPITALIST

## 2022-11-04 PROCEDURE — 36415 COLL VENOUS BLD VENIPUNCTURE: CPT | Performed by: NURSE PRACTITIONER

## 2022-11-04 PROCEDURE — 99285 EMERGENCY DEPT VISIT HI MDM: CPT | Mod: 25

## 2022-11-04 PROCEDURE — 83880 ASSAY OF NATRIURETIC PEPTIDE: CPT | Performed by: NURSE PRACTITIONER

## 2022-11-04 PROCEDURE — 93005 ELECTROCARDIOGRAM TRACING: CPT

## 2022-11-04 PROCEDURE — 96374 THER/PROPH/DIAG INJ IV PUSH: CPT

## 2022-11-04 PROCEDURE — 99284 EMERGENCY DEPT VISIT MOD MDM: CPT | Mod: ,,, | Performed by: NURSE PRACTITIONER

## 2022-11-04 PROCEDURE — 63600175 PHARM REV CODE 636 W HCPCS: Performed by: NURSE PRACTITIONER

## 2022-11-04 PROCEDURE — 84484 ASSAY OF TROPONIN QUANT: CPT | Performed by: NURSE PRACTITIONER

## 2022-11-04 PROCEDURE — 99284 PR EMERGENCY DEPT VISIT,LEVEL IV: ICD-10-PCS | Mod: ,,, | Performed by: NURSE PRACTITIONER

## 2022-11-04 RX ORDER — KETOROLAC TROMETHAMINE 30 MG/ML
30 INJECTION, SOLUTION INTRAMUSCULAR; INTRAVENOUS
Status: COMPLETED | OUTPATIENT
Start: 2022-11-04 | End: 2022-11-04

## 2022-11-04 RX ADMIN — KETOROLAC TROMETHAMINE 30 MG: 30 INJECTION, SOLUTION INTRAMUSCULAR at 02:11

## 2022-11-04 NOTE — Clinical Note
"Mally"Martha Woods was seen and treated in our emergency department on 11/4/2022.  She may return to work on 11/05/2022.       If you have any questions or concerns, please don't hesitate to call.      Jeronimo Segura BSN RN    "

## 2022-11-04 NOTE — ED TRIAGE NOTES
Patient reports to ED with sharp chest pain that radiates to left shoulder and through to her back that started 20 minutes PTA. Patient has history of thoracic aneurysm.

## 2022-11-04 NOTE — ED PROVIDER NOTES
Encounter Date: 11/4/2022       History     Chief Complaint   Patient presents with    Chest Pain     57 year old female presents to ED with complaint of left shoulder pain. She states she was having pain to her left shoulder for a few days and thought she may have slept on it wrong or carried a heavy object on her shoulder. While sitting at desk today, she started having sharp shooting pains to her shoulder blade that radiated into her arm. She rated pain an 8 on 0-10 scale. She denies pain at this time. Denies nausea/vomiting, diaphoresis. Pertinent medical history of aortic aneurysm that she states was recently checked and stable.     The history is provided by the patient. No  was used.   Chest Pain  The current episode started today. Duration of episode(s) is 20 minutes. The chest pain is resolved. At its most intense, the chest pain is at 8/10. The chest pain is currently at 0/10. The quality of the pain is described as sharp and stabbing. The pain radiates to the left shoulder. Pertinent negatives for primary symptoms include no fever, no shortness of breath, no cough, no palpitations, no abdominal pain, no nausea and no vomiting.   Pertinent negatives for associated symptoms include no weakness. She tried nothing for the symptoms. Risk factors include obesity and post-menopausal.   Her past medical history is significant for aortic aneurysm.   Review of patient's allergies indicates:   Allergen Reactions    Betamethasone Other (See Comments)     Flushing    Fentanyl Other (See Comments)     Extreme sedation    Hydrochlorothiazide Other (See Comments)     Reaction unknown    Penicillins Hives     Can take rocephin    Promethazine hcl Other (See Comments)     Hallucinations    Sulfa (sulfonamide antibiotics) Itching    Hydromorphone hcl Palpitations    Latex Rash    Morphine Palpitations     Past Medical History:   Diagnosis Date    Acute superficial gastritis without hemorrhage 10/21/2021     Anxiety     Aortic aneurysm 11/04/2020    w/o rupture    Cervical disc disorder     Chest pain in adult 10/21/2021    Depression     History of transient ischemic attack (TIA) 11/04/2020    Hyperlipidemia     Hypertension     Stroke     Thoracic aortic aneurysm     Vitamin deficiency      Past Surgical History:   Procedure Laterality Date    CARPAL TUNNEL RELEASE  12/21/2017    Dr. Moran with cyst removed from left middle finger    CHOLECYSTECTOMY      TOTAL REDUCTION MAMMOPLASTY       Family History   Problem Relation Age of Onset    Hyperlipidemia Mother     Hypertension Mother     Glaucoma Mother     Diabetes Mother     Cervical cancer Mother     Pancreatic cancer Mother     Hypertension Father     Dementia Father      Social History     Tobacco Use    Smoking status: Never    Smokeless tobacco: Never   Substance Use Topics    Alcohol use: Never    Drug use: Never     Review of Systems   Constitutional:  Negative for chills and fever.   HENT:  Negative for congestion, sinus pressure and sinus pain.    Eyes:  Negative for photophobia and visual disturbance.   Respiratory:  Negative for cough and shortness of breath.    Cardiovascular:  Positive for chest pain. Negative for palpitations.   Gastrointestinal:  Negative for abdominal pain, nausea and vomiting.   Endocrine: Negative for cold intolerance and heat intolerance.   Genitourinary:  Negative for decreased urine volume and urgency.   Musculoskeletal:  Positive for back pain. Negative for arthralgias.   Skin:  Negative for color change and wound.   Neurological:  Negative for weakness and light-headedness.   Hematological:  Negative for adenopathy. Does not bruise/bleed easily.   Psychiatric/Behavioral:  Negative for agitation and confusion.    All other systems reviewed and are negative.    Physical Exam     Initial Vitals [11/04/22 1132]   BP Pulse Resp Temp SpO2   (!) 158/80 73 18 97.9 °F (36.6 °C) 98 %      MAP       --         Physical Exam    Nursing  note and vitals reviewed.  Constitutional: She appears well-developed and well-nourished.   HENT:   Head: Normocephalic and atraumatic.   Eyes: EOM are normal. Pupils are equal, round, and reactive to light.   Neck: Neck supple.   Normal range of motion.  Cardiovascular:  Normal rate and regular rhythm.           Pulmonary/Chest: She has no wheezes. She has no rhonchi.   Abdominal: Abdomen is soft. She exhibits no distension. There is no abdominal tenderness.   Musculoskeletal:         General: No tenderness or edema. Normal range of motion.      Cervical back: Normal range of motion and neck supple.     Neurological: She is alert and oriented to person, place, and time.   Skin: Skin is warm and dry. Capillary refill takes less than 2 seconds.   Psychiatric: She has a normal mood and affect. Thought content normal.       ED Course   Procedures  Labs Reviewed   CBC WITH DIFFERENTIAL - Abnormal; Notable for the following components:       Result Value    Neutrophils % 66.7 (*)     Lymphocytes % 23.1 (*)     Monocytes % 8.1 (*)     All other components within normal limits   TROPONIN I - Normal   NT-PRO NATRIURETIC PEPTIDE - Normal   CBC W/ AUTO DIFFERENTIAL    Narrative:     The following orders were created for panel order CBC auto differential.  Procedure                               Abnormality         Status                     ---------                               -----------         ------                     CBC with Differential[[id number]]        Abnormal            Final result                 Please view results for these tests on the individual orders.   COMPREHENSIVE METABOLIC PANEL     EKG Readings: (Independently Interpreted)   Rhythm: Normal Sinus Rhythm.   ECG Results              EKG 12-lead (In process)  Result time 11/04/22 12:30:41      In process by Interface, Lab In [facility] (11/04/22 12:30:41)                   Narrative:    Test Reason : R07.9,    Vent. Rate : 069 BPM     Atrial Rate : 000  BPM     P-R Int : 176 ms          QRS Dur : 098 ms      QT Int : 388 ms       P-R-T Axes : 072 078 074 degrees     QTc Int : 404 ms    Sinus rhythm  Septal T wave abnormality  is nonspecific  Borderline ECG      Referred By: [email or web address]   SELF           Confirmed By:                                   Imaging Results              CT Chest Without Contrast (Final result)  Result time 11/04/22 13:53:46      Final result by Christiano Lopez MD (11/04/22 13:53:46)                   Impression:      Mild aneurysmal dilatation of the ascending aorta at 4.5 cm maximum diameter.    No definite acute process      Electronically signed by: Christiano Lopez  Date:    11/04/2022  Time:    13:53               Narrative:    EXAMINATION:  CT CHEST WITHOUT CONTRAST    CLINICAL HISTORY:  Aortic aneurysm, known or suspected;    TECHNIQUE:  Spiral CT sections were obtained through the lungs without contrast.  Sagittal and coronal multiplanar reconstruction images are also examined.    The CT examination was performed using one or more of the following dose reduction techniques: Automated exposure control, adjustment of the mA and kV according to patient's size, use of acute or iterative reconstruction techniques.    COMPARISON:  No previous similar chest CT available    FINDINGS:  There is some fusiform aneurysmal dilatation of the ascending aorta which measures 4.5 cm maximum diameter.  The aorta measures 4.2 cm at the proximal aortic arch level and 3.1 cm at the proximal descending thoracic aorta level.    There is no mediastinal mass or mediastinal lymphadenopathy.    There is no pleural or pericardial effusion.    Lungs are well expanded and clear.  Central airway is clear.    There is no definite acute process in the partially visualized upper abdomen.  Prior cholecystectomy.    There is no acute osseous abnormality.  There is mild thoracic spondylosis.                                       X-Ray Chest AP Portable (Final result)   Result time 11/04/22 12:39:24      Final result by Pedro Angeles DO (11/04/22 12:39:24)                   Impression:      Continued cardiomegaly without evelyn pulmonary edema or focal consolidating pneumonia.    Point of Service: [facility]      Electronically signed by: Pedro Angeles  Date:    11/04/2022  Time:    12:39               Narrative:    EXAMINATION:  XR CHEST AP PORTABLE    CLINICAL HISTORY:  Chest Pain;    COMPARISON:  Chest x-ray September 11, 2022    TECHNIQUE:  Frontal view/views of the chest.    FINDINGS:  Continued cardiomegaly.  No focal consolidation, pleural effusion, or pneumothorax.  Visualized osseous and surrounding soft tissue structures appear grossly unchanged.                                       Medications   ketorolac injection 30 mg (has no administration in time range)           APC / Resident Notes:   Continues to deny chest pain. States left shoulder pain with having to hold arm over head for CT. Discussed POC and follow ups. Verbalizes understanding.                    Clinical Impression:   Final diagnoses:  [R07.9] Chest pain (Primary)  [I71.9] Aortic aneurysm without rupture, unspecified portion of aorta  [S46.912A] Shoulder strain, left, initial encounter        ED Disposition Condition    Discharge Stable          ED Prescriptions    None       Follow-up Information    None          Parvin Mcdonald, MARY  11/04/22 2571

## 2022-11-10 RX ORDER — LISINOPRIL 5 MG/1
TABLET ORAL
Qty: 30 TABLET | Refills: 1 | OUTPATIENT
Start: 2022-11-10

## 2022-11-11 ENCOUNTER — CLINICAL SUPPORT (OUTPATIENT)
Dept: REHABILITATION | Facility: HOSPITAL | Age: 57
End: 2022-11-11
Payer: COMMERCIAL

## 2022-11-11 DIAGNOSIS — R29.898 DECREASED RANGE OF MOTION OF NECK: ICD-10-CM

## 2022-11-11 DIAGNOSIS — M47.812 CERVICAL FACET SYNDROME: Primary | ICD-10-CM

## 2022-11-11 PROCEDURE — 97110 THERAPEUTIC EXERCISES: CPT | Mod: PN

## 2022-11-11 PROCEDURE — 97035 APP MDLTY 1+ULTRASOUND EA 15: CPT | Mod: PN

## 2022-11-11 NOTE — PROGRESS NOTES
Physical Therapy Treatment Note     Name: Mally Woods  Clinic Number: [id number]    Therapy Diagnosis:   Encounter Diagnoses   Name Primary?    Cervical facet syndrome Yes    Decreased range of motion of neck      Physician: Rebeca Walter FNP    Visit Date: 11/11/2022    Physician Orders: PT Eval and Treat neck pain  Medical Diagnosis from Referral:M47.812 (ICD-10-CM) - Cervical facet syndrome   Evaluation Date: 8/19/2022  Authorization Period Expiration: 12/30/2022  Plan of Care Expiration: 11/20/2022  Visit # / Visits authorized: 11/ 20  PTA Visit #:     Time In: 1435  Time Out: 1525  Total Billable Time:50 minutes    Precautions: Standard    Subjective     Pt reports: left side neck and shoulder is worse today . Left rotator cuff is really bothering her   She was compliant with home exercise program.  Response to previous treatment: no c/o    Pain: 2/10  Location: bilateral neck      Objective     Mally received therapeutic exercises to develop strength, ROM, flexibility, and posture for 27 minutes including:    UBE x 4 minutes   Pulleys x 4  minutes   Doorway pec stretching with bilateral rotation x 10 second hold   Bilateral side lying head lifts x 10  Supine head lifts x 10   Sitting kathols red theraband x 10  Sitting bilateral tilt and extension manual resisted exercises x 10  Bilateral scapular retraction with rows, extension x 15 repetitions each  Cervical stretches: rotation, lateral flexion, levator scapulae bilaterally at 10 second hold x 6 repetitions each  Bilateral sternocleidomastoid stretch x 10 repetitions     Cervical range of motion measures:    Rotation Right 60 degrees  Left 60 degrees    Lateral tilt Right 40 degrees Left 40 degrees      Left shoulder flexion  145                         Abduction 120    Mally received the following direct contact modalities after being cleared for contraindications: Ultrasound:  Mally received ultrasound to manage pain and inflammation at 100 %  duty cycle applied to the bilateral cervical paraspinals at an intensity of  1.8 W/cm2  for a duration of 8 minutes. Patient tolerated treatment well without adverse effects. Therapist was in attendance throughout intervention.    Pt received ifc to middle back and neck at an intensity of 10 x 15 min with mhp . Pt had no adverse reaction with heat or estim.      Home Exercises Provided and Patient Education Provided     Education provided: continue current home exercise program    Written Home Exercises Provided: Patient instructed to cont prior HEP.  Exercises were reviewed and Mally was able to demonstrate them prior to the end of the session.  Mally demonstrated good  understanding of the education provided.     See EMR under Patient Instructions for exercises provided prior visit.    Assessment   Patient voicing decreased pain today 1/10  Mally Is progressing well towards her goals.   Pt prognosis is Good.     Pt will continue to benefit from skilled outpatient physical therapy to address the deficits listed in the problem list box on initial evaluation, provide pt/family education and to maximize pt's level of independence in the home and community environment.     Anticipated barriers to physical therapy: home exercise program compliance    Goals:  1. Patient will be independent with home exercise program-met  2. Patient will report decrease in pain at worse to 3/10 to improve quality of life-met  3. Patient will hold correct posture independently for 1 minute to improve ergonomic positioning at work    Plan     Plan of care Certification: 8/19/2022 to 11/20/2022.     Outpatient Physical Therapy 2 times weekly for 8 weeks to include the following interventions: Cervical/Lumbar Traction, Electrical Stimulation ifc/premod, Manual Therapy, Moist Heat/ Ice, Neuromuscular Re-ed, Patient Education, Therapeutic Activities, Therapeutic Exercise and Ultrasound.     Continue per Plan of Care   Po Diaz  PT  11/11/2022

## 2022-11-17 ENCOUNTER — HOSPITAL ENCOUNTER (EMERGENCY)
Facility: HOSPITAL | Age: 57
Discharge: HOME | End: 2022-11-17
Attending: EMERGENCY MEDICINE
Payer: COMMERCIAL

## 2022-11-17 VITALS
SYSTOLIC BLOOD PRESSURE: 143 MMHG | RESPIRATION RATE: 20 BRPM | TEMPERATURE: 100 F | BODY MASS INDEX: 44.22 KG/M2 | WEIGHT: 259 LBS | HEART RATE: 94 BPM | HEIGHT: 64 IN | OXYGEN SATURATION: 96 % | DIASTOLIC BLOOD PRESSURE: 76 MMHG

## 2022-11-17 DIAGNOSIS — B34.9 VIRAL ILLNESS: ICD-10-CM

## 2022-11-17 DIAGNOSIS — R51.9 NONINTRACTABLE HEADACHE, UNSPECIFIED CHRONICITY PATTERN, UNSPECIFIED HEADACHE TYPE: Primary | ICD-10-CM

## 2022-11-17 LAB
FLUAV AG UPPER RESP QL IA.RAPID: NEGATIVE
FLUBV AG UPPER RESP QL IA.RAPID: NEGATIVE
SARS-COV+SARS-COV-2 AG RESP QL IA.RAPID: NEGATIVE

## 2022-11-17 PROCEDURE — 87428 SARSCOV & INF VIR A&B AG IA: CPT | Performed by: EMERGENCY MEDICINE

## 2022-11-17 PROCEDURE — 99282 EMERGENCY DEPT VISIT SF MDM: CPT | Mod: ,,, | Performed by: EMERGENCY MEDICINE

## 2022-11-17 PROCEDURE — 99282 PR EMERGENCY DEPT VISIT,LEVEL II: ICD-10-PCS | Mod: ,,, | Performed by: EMERGENCY MEDICINE

## 2022-11-17 PROCEDURE — 99283 EMERGENCY DEPT VISIT LOW MDM: CPT

## 2022-11-17 NOTE — DISCHARGE INSTRUCTIONS
DRINK PLENTY OF FLUIDS.  TAKE TYLENOL OR IBUPROFEN FOR ACHES / FEVER / HEADACHE.  FOLLOW UP IF SYMPTOMS PERSIST OR WORSEN OR OTHERWISE AS NEEDED.

## 2022-11-17 NOTE — ED PROVIDER NOTES
Encounter Date: 11/17/2022       History     Chief Complaint   Patient presents with    Headache     56 Y/O FEMALE WITH FLU-LIKE SYMPTOMS THAT STARTED 2 DAYS AGO.  SHE HAS A HEADACHE THAT IS MODERATE.  SHE HAS HAD SOME CONGESTION, SORE THROAT, AND COUGH.  SHE NOTES NO REMITTING OR EXACERBATING FACTORS.      Review of patient's allergies indicates:   Allergen Reactions    Betamethasone Other (See Comments)     Flushing    Fentanyl Other (See Comments)     Extreme sedation    Hydrochlorothiazide Other (See Comments)     Reaction unknown    Penicillins Hives     Can take rocephin    Promethazine hcl Other (See Comments)     Hallucinations    Sulfa (sulfonamide antibiotics) Itching    Hydromorphone hcl Palpitations    Latex Rash    Morphine Palpitations     Past Medical History:   Diagnosis Date    Acute superficial gastritis without hemorrhage 10/21/2021    Anxiety     Aortic aneurysm 11/04/2020    w/o rupture    Cervical disc disorder     Chest pain in adult 10/21/2021    Depression     History of transient ischemic attack (TIA) 11/04/2020    Hyperlipidemia     Hypertension     Stroke     Thoracic aortic aneurysm     Vitamin deficiency      Past Surgical History:   Procedure Laterality Date    CARPAL TUNNEL RELEASE  12/21/2017    Dr. Moran with cyst removed from left middle finger    CHOLECYSTECTOMY      TOTAL REDUCTION MAMMOPLASTY       Family History   Problem Relation Age of Onset    Hyperlipidemia Mother     Hypertension Mother     Glaucoma Mother     Diabetes Mother     Cervical cancer Mother     Pancreatic cancer Mother     Hypertension Father     Dementia Father      Social History     Tobacco Use    Smoking status: Never    Smokeless tobacco: Never   Substance Use Topics    Alcohol use: Never    Drug use: Never     Review of Systems   All other systems reviewed and are negative.    Physical Exam     Initial Vitals [11/17/22 0829]   BP Pulse Resp Temp SpO2   (!) 143/76 94 20 100 °F (37.8 °C) 96 %      MAP        --         Physical Exam    Nursing note and vitals reviewed.  Constitutional: She appears well-developed and well-nourished.   HENT:   Head: Normocephalic and atraumatic.   POST-NASAL DRAINAGE.  BOGGY NASAL MUCOSA.     Eyes: Conjunctivae and EOM are normal. Pupils are equal, round, and reactive to light.   Neck: Neck supple.   Normal range of motion.  Cardiovascular:  Normal rate, regular rhythm and normal heart sounds.           Pulmonary/Chest: Breath sounds normal.   Abdominal: Abdomen is soft. Bowel sounds are normal.   Musculoskeletal:         General: Normal range of motion.      Cervical back: Normal range of motion and neck supple.     Neurological: She is alert and oriented to person, place, and time. She has normal strength. GCS score is 15. GCS eye subscore is 4. GCS verbal subscore is 5. GCS motor subscore is 6.   Skin: Skin is warm and dry. Capillary refill takes less than 2 seconds.   Psychiatric: She has a normal mood and affect.       Medical Screening Exam   See Full Note    ED Course   Procedures  Labs Reviewed   SARS-COV2 (COVID) W/ FLU ANTIGEN - Normal    Narrative:     Negative SARS-CoV results should not be used as the sole basis for treatment or patient management decisions; negative results should be considered in the context of a patient's recent exposures, history and the presene of clinical signs and symptoms consistent with COVID-19.  Negative results should be treated as presumptive and confirmed by molecular assay, if necessary for patient management.          Imaging Results    None          Medications - No data to display                    Clinical Impression:   Final diagnoses:  [R51.9] Nonintractable headache, unspecified chronicity pattern, unspecified headache type (Primary)  [B34.9] Viral illness      ED Disposition Condition    Discharge Stable          ED Prescriptions    None       Follow-up Information       Follow up With Specialties Details Why Contact Kia Diaz  ANAT Smalls, [facility], Emergency Medicine  As needed [address]  [facility] [place] [address]  [phone]      Emily Smalls, [facility], Emergency Medicine   [address]  [facility] [place] [address]  [phone]               Ruel Hanks MD  11/17/22 1223

## 2022-11-18 ENCOUNTER — HOSPITAL ENCOUNTER (EMERGENCY)
Facility: HOSPITAL | Age: 57
Discharge: HOME | End: 2022-11-19
Attending: FAMILY MEDICINE
Payer: COMMERCIAL

## 2022-11-18 DIAGNOSIS — J11.1 INFLUENZA: Primary | ICD-10-CM

## 2022-11-18 PROCEDURE — 96374 THER/PROPH/DIAG INJ IV PUSH: CPT

## 2022-11-18 PROCEDURE — 96361 HYDRATE IV INFUSION ADD-ON: CPT

## 2022-11-18 PROCEDURE — 99284 EMERGENCY DEPT VISIT MOD MDM: CPT | Mod: ,,, | Performed by: FAMILY MEDICINE

## 2022-11-18 PROCEDURE — 99284 EMERGENCY DEPT VISIT MOD MDM: CPT | Mod: 25

## 2022-11-18 PROCEDURE — 99284 PR EMERGENCY DEPT VISIT,LEVEL IV: ICD-10-PCS | Mod: ,,, | Performed by: FAMILY MEDICINE

## 2022-11-18 NOTE — Clinical Note
"Mally"Martha Woods was seen and treated in our emergency department on 11/18/2022.  She may return to work on 11/23/2022.       If you have any questions or concerns, please don't hesitate to call.      Isatu Waite MD"

## 2022-11-19 VITALS
HEIGHT: 65 IN | WEIGHT: 259 LBS | TEMPERATURE: 99 F | HEART RATE: 67 BPM | SYSTOLIC BLOOD PRESSURE: 111 MMHG | DIASTOLIC BLOOD PRESSURE: 58 MMHG | BODY MASS INDEX: 43.15 KG/M2 | OXYGEN SATURATION: 96 % | RESPIRATION RATE: 18 BRPM

## 2022-11-19 LAB
ALBUMIN SERPL BCP-MCNC: 3.4 G/DL (ref 3.5–5)
ALBUMIN/GLOB SERPL: 1 {RATIO}
ALP SERPL-CCNC: 94 U/L (ref 46–118)
ALT SERPL W P-5'-P-CCNC: 46 U/L (ref 13–56)
ANION GAP SERPL CALCULATED.3IONS-SCNC: 8 MMOL/L (ref 7–16)
AST SERPL W P-5'-P-CCNC: 31 U/L (ref 15–37)
BASOPHILS # BLD AUTO: 0.05 K/UL (ref 0–0.2)
BASOPHILS NFR BLD AUTO: 0.8 % (ref 0–1)
BILIRUB SERPL-MCNC: 0.3 MG/DL (ref ?–1.2)
BUN SERPL-MCNC: 14 MG/DL (ref 7–18)
BUN/CREAT SERPL: 14 (ref 6–20)
CALCIUM SERPL-MCNC: 8.1 MG/DL (ref 8.5–10.1)
CHLORIDE SERPL-SCNC: 103 MMOL/L (ref 98–107)
CO2 SERPL-SCNC: 29 MMOL/L (ref 21–32)
CREAT SERPL-MCNC: 1 MG/DL (ref 0.55–1.02)
DIFFERENTIAL METHOD BLD: ABNORMAL
EGFR (NO RACE VARIABLE) (RUSH/TITUS): 65 ML/MIN/1.73M²
EOSINOPHIL # BLD AUTO: 0.16 K/UL (ref 0–0.5)
EOSINOPHIL NFR BLD AUTO: 2.4 % (ref 1–4)
ERYTHROCYTE [DISTWIDTH] IN BLOOD BY AUTOMATED COUNT: 12.8 % (ref 11.5–14.5)
FLUAV AG UPPER RESP QL IA.RAPID: POSITIVE
FLUBV AG UPPER RESP QL IA.RAPID: NEGATIVE
GLOBULIN SER-MCNC: 3.3 G/DL (ref 2–4)
GLUCOSE SERPL-MCNC: 103 MG/DL (ref 74–106)
HCT VFR BLD AUTO: 42.9 % (ref 38–47)
HGB BLD-MCNC: 13.2 G/DL (ref 12–16)
IMM GRANULOCYTES # BLD AUTO: 0.03 K/UL (ref 0–0.04)
IMM GRANULOCYTES NFR BLD: 0.5 % (ref 0–0.4)
LYMPHOCYTES # BLD AUTO: 1.2 K/UL (ref 1–4.8)
LYMPHOCYTES NFR BLD AUTO: 18.3 % (ref 27–41)
MCH RBC QN AUTO: 30.1 PG (ref 27–31)
MCHC RBC AUTO-ENTMCNC: 30.8 G/DL (ref 32–36)
MCV RBC AUTO: 97.7 FL (ref 80–96)
MONOCYTES # BLD AUTO: 0.74 K/UL (ref 0–0.8)
MONOCYTES NFR BLD AUTO: 11.3 % (ref 2–6)
MPC BLD CALC-MCNC: 10.5 FL (ref 9.4–12.4)
NEUTROPHILS # BLD AUTO: 4.36 K/UL (ref 1.8–7.7)
NEUTROPHILS NFR BLD AUTO: 66.7 % (ref 53–65)
NRBC # BLD AUTO: 0 X10E3/UL
NRBC, AUTO (.00): 0 %
PLATELET # BLD AUTO: 198 K/UL (ref 150–400)
POTASSIUM SERPL-SCNC: 3.1 MMOL/L (ref 3.5–5.1)
PROT SERPL-MCNC: 6.7 G/DL (ref 6.4–8.2)
RBC # BLD AUTO: 4.39 M/UL (ref 4.2–5.4)
SODIUM SERPL-SCNC: 137 MMOL/L (ref 136–145)
WBC # BLD AUTO: 6.54 K/UL (ref 4.5–11)

## 2022-11-19 PROCEDURE — 80053 COMPREHEN METABOLIC PANEL: CPT | Performed by: FAMILY MEDICINE

## 2022-11-19 PROCEDURE — 36415 COLL VENOUS BLD VENIPUNCTURE: CPT | Performed by: FAMILY MEDICINE

## 2022-11-19 PROCEDURE — 63600175 PHARM REV CODE 636 W HCPCS: Performed by: FAMILY MEDICINE

## 2022-11-19 PROCEDURE — 25000003 PHARM REV CODE 250: Performed by: FAMILY MEDICINE

## 2022-11-19 PROCEDURE — 87804 INFLUENZA ASSAY W/OPTIC: CPT | Performed by: FAMILY MEDICINE

## 2022-11-19 PROCEDURE — 85025 COMPLETE CBC W/AUTO DIFF WBC: CPT | Performed by: FAMILY MEDICINE

## 2022-11-19 RX ORDER — OSELTAMIVIR PHOSPHATE 75 MG/1
75 CAPSULE ORAL 2 TIMES DAILY
Qty: 10 CAPSULE | Refills: 0 | Status: SHIPPED | OUTPATIENT
Start: 2022-11-19 | End: 2022-11-24

## 2022-11-19 RX ORDER — ONDANSETRON 4 MG/1
4 TABLET, ORALLY DISINTEGRATING ORAL EVERY 8 HOURS PRN
Qty: 20 TABLET | Refills: 0 | Status: SHIPPED | OUTPATIENT
Start: 2022-11-19 | End: 2023-06-06 | Stop reason: SDUPTHER

## 2022-11-19 RX ORDER — ONDANSETRON 2 MG/ML
4 INJECTION INTRAMUSCULAR; INTRAVENOUS
Status: COMPLETED | OUTPATIENT
Start: 2022-11-19 | End: 2022-11-19

## 2022-11-19 RX ADMIN — ONDANSETRON HYDROCHLORIDE 4 MG: 2 SOLUTION INTRAMUSCULAR; INTRAVENOUS at 12:11

## 2022-11-19 RX ADMIN — SODIUM CHLORIDE 1000 ML: 9 INJECTION, SOLUTION INTRAVENOUS at 12:11

## 2022-11-19 NOTE — ED PROVIDER NOTES
Encounter Date: 11/18/2022       History     Chief Complaint   Patient presents with    Nausea     Nausea and vomiting seen 11/17 for same symptoms      Patient is a 57-year-old female, presents emergency department with multiple complaints.  For the past 2 days, she has had a fever, nausea, vomiting, diarrhea, headache, congestion, sore throat, body aches, fatigue, and near syncopal episode.  Patient states she started having diarrhea today and when she tried to get off the commode she felt lightheaded and dizzy.  She did not have a syncopal episode.  She is had very little p.o. intake because of her nausea.    Review of patient's allergies indicates:   Allergen Reactions    Betamethasone Other (See Comments)     Flushing    Fentanyl Other (See Comments)     Extreme sedation    Hydrochlorothiazide Other (See Comments)     Reaction unknown    Penicillins Hives     Can take rocephin    Promethazine hcl Other (See Comments)     Hallucinations    Sulfa (sulfonamide antibiotics) Itching    Hydromorphone hcl Palpitations    Latex Rash    Morphine Palpitations     Past Medical History:   Diagnosis Date    Acute superficial gastritis without hemorrhage 10/21/2021    Anxiety     Aortic aneurysm 11/04/2020    w/o rupture    Cervical disc disorder     Chest pain in adult 10/21/2021    Depression     History of transient ischemic attack (TIA) 11/04/2020    Hyperlipidemia     Hypertension     Stroke     Thoracic aortic aneurysm     Vitamin deficiency      Past Surgical History:   Procedure Laterality Date    CARPAL TUNNEL RELEASE  12/21/2017    Dr. Moran with cyst removed from left middle finger    CHOLECYSTECTOMY      TOTAL REDUCTION MAMMOPLASTY       Family History   Problem Relation Age of Onset    Hyperlipidemia Mother     Hypertension Mother     Glaucoma Mother     Diabetes Mother     Cervical cancer Mother     Pancreatic cancer Mother     Hypertension Father     Dementia Father      Social History     Tobacco Use     Smoking status: Never    Smokeless tobacco: Never   Substance Use Topics    Alcohol use: Never    Drug use: Never     Review of Systems   Constitutional:  Positive for fever.   HENT:  Positive for congestion, ear pain, postnasal drip, rhinorrhea, sinus pressure, sinus pain and sore throat.    Respiratory:  Positive for cough.    All other systems reviewed and are negative.    Physical Exam     Initial Vitals   BP Pulse Resp Temp SpO2   11/19/22 0022 11/18/22 2353 11/18/22 2353 11/18/22 2353 11/19/22 0022   (!) 141/73 69 18 98.6 °F (37 °C) 97 %      MAP       --                Physical Exam    Vitals reviewed.  Constitutional: She appears well-developed and well-nourished.   HENT:   Head: Normocephalic.   Right Ear: External ear normal.   Left Ear: External ear normal.   Mouth/Throat: Oropharynx is clear and moist. No oropharyngeal exudate.   Eyes: Pupils are equal, round, and reactive to light.   Cardiovascular:  Normal rate, regular rhythm and normal heart sounds.           No murmur heard.  Pulmonary/Chest: Breath sounds normal. No respiratory distress. She has no wheezes.   Abdominal: Abdomen is soft. Bowel sounds are normal. She exhibits no distension. There is no abdominal tenderness.     Neurological: She is alert and oriented to person, place, and time.   Psychiatric: She has a normal mood and affect.       Medical Screening Exam   See Full Note    ED Course   Procedures  Labs Reviewed   RAPID INFLUENZA A/B - Abnormal; Notable for the following components:       Result Value    Influenza A Positive (*)     All other components within normal limits   COMPREHENSIVE METABOLIC PANEL - Abnormal; Notable for the following components:    Potassium 3.1 (*)     Calcium 8.1 (*)     Albumin 3.4 (*)     All other components within normal limits   CBC WITH DIFFERENTIAL - Abnormal; Notable for the following components:    MCV 97.7 (*)     MCHC 30.8 (*)     Neutrophils % 66.7 (*)     Lymphocytes % 18.3 (*)     Monocytes %  11.3 (*)     Immature Granulocytes % 0.5 (*)     All other components within normal limits   CBC W/ AUTO DIFFERENTIAL    Narrative:     The following orders were created for panel order CBC auto differential.  Procedure                               Abnormality         Status                     ---------                               -----------         ------                     CBC with Differential[[id number]]        Abnormal            Final result                 Please view results for these tests on the individual orders.          Imaging Results    None          Medications   ondansetron injection 4 mg (4 mg Intravenous Given 11/19/22 0011)   sodium chloride 0.9% bolus 1,000 mL (0 mLs Intravenous Stopped 11/19/22 0112)                       Clinical Impression:   Final diagnoses:  [J11.1] Influenza (Primary)      ED Disposition Condition    Discharge Stable          ED Prescriptions       Medication Sig Dispense Start Date End Date Auth. Provider    oseltamivir (TAMIFLU) 75 MG capsule Take 1 capsule (75 mg total) by mouth 2 (two) times daily. for 5 days 10 capsule 11/19/2022 11/24/2022 Isatu Waite MD    ondansetron (ZOFRAN-ODT) 4 MG TbDL Take 1 tablet (4 mg total) by mouth every 8 (eight) hours as needed (nausea). 20 tablet 11/19/2022 -- Istau Waite MD          Follow-up Information    None          Isatu Waite MD  11/19/22 0205

## 2022-11-19 NOTE — DISCHARGE INSTRUCTIONS
Take your medications as prescribed. Drink plenty of fluids. You can take Tylenol as needed for fever. Follow up with your primary care provider if your symptoms to not improve in 7-10 days, or return to the ED for new or worsening symptoms.

## 2022-11-28 RX ORDER — LISINOPRIL 5 MG/1
TABLET ORAL
Qty: 30 TABLET | Refills: 1 | OUTPATIENT
Start: 2022-11-28

## 2022-12-02 ENCOUNTER — CLINICAL SUPPORT (OUTPATIENT)
Dept: REHABILITATION | Facility: HOSPITAL | Age: 57
End: 2022-12-02
Payer: COMMERCIAL

## 2022-12-02 DIAGNOSIS — M47.812 CERVICAL FACET SYNDROME: Primary | ICD-10-CM

## 2022-12-02 DIAGNOSIS — R29.898 DECREASED RANGE OF MOTION OF NECK: ICD-10-CM

## 2022-12-02 PROCEDURE — 97110 THERAPEUTIC EXERCISES: CPT | Mod: PN,CQ

## 2022-12-02 PROCEDURE — 97035 APP MDLTY 1+ULTRASOUND EA 15: CPT | Mod: PN,CQ

## 2022-12-02 NOTE — PROGRESS NOTES
Physical Therapy Treatment Note     Name: Mally Woods  Clinic Number: [id number]    Therapy Diagnosis:   Encounter Diagnoses   Name Primary?    Cervical facet syndrome Yes    Decreased range of motion of neck      Physician: Rebeca Walter FNP    Visit Date: 12/2/2022    Physician Orders: PT Eval and Treat neck pain  Medical Diagnosis from Referral:M47.812 (ICD-10-CM) - Cervical facet syndrome   Evaluation Date: 8/19/2022  Authorization Period Expiration: 12/30/2022  Plan of Care Expiration: 11/20/2022  Visit # / Visits authorized: 12/ 20  PTA Visit #: 1    Time In: 245  Time Out: 323  Total Billable Time:38 minutes    Precautions: Standard    Subjective     Pt reports: I've been sick with the flu, my shoulder and neck are better. I think my purse was the problem.  She was compliant with home exercise program.  Response to previous treatment: no c/o    Pain: 2/10  Location: bilateral neck      Objective     Mally received therapeutic exercises to develop strength, ROM, flexibility, and posture for 30 minutes including:    UBE x 5 minutes   Pulleys x 4  minutes   Doorway pec stretching with bilateral rotation x 10 second hold   Bilateral side lying head lifts x 10  Supine chin tuck head lifts x 10   Sitting kathols red theraband x 10  Cervical stretches: rotation, lateral flexion, levator scapulae bilaterally at 10 second hold x 6 repetitions each  Bilateral sternocleidomastoid stretch x 10 repetitions     Cervical range of motion measures:    Rotation Right 58 degrees  Left 55 degrees    Lateral tilt Right 38 degrees Left 38 degrees      Left shoulder flexion 155                         Abduction 140    Mally received the following direct contact modalities after being cleared for contraindications: Ultrasound:  Mally received ultrasound to manage pain and inflammation at 100 % duty cycle applied to the bilateral cervical paraspinals at an intensity of  1.8 W/cm2/ 1 mhz  for a duration of 8 minutes. Patient  tolerated treatment well without adverse effects. Therapist was in attendance throughout intervention.    Pt received ifc to middle back and neck at an intensity of x min with mhp .     Home Exercises Provided and Patient Education Provided     Education provided: continue current home exercise program    Written Home Exercises Provided: Patient instructed to cont prior HEP.  Exercises were reviewed and Mally was able to demonstrate them prior to the end of the session.  Mally demonstrated good  understanding of the education provided.     See EMR under Patient Instructions for exercises provided prior visit.    Assessment   Patient fatigued with including chin tuck to head lifts, patient had improved shoulder range of motion.  Mally Is progressing well towards her goals.   Pt prognosis is Good.     Pt will continue to benefit from skilled outpatient physical therapy to address the deficits listed in the problem list box on initial evaluation, provide pt/family education and to maximize pt's level of independence in the home and community environment.     Anticipated barriers to physical therapy: home exercise program compliance    Goals:  1. Patient will be independent with home exercise program-met  2. Patient will report decrease in pain at worse to 3/10 to improve quality of life-met  3. Patient will hold correct posture independently for 1 minute to improve ergonomic positioning at work    Plan     Plan of care Certification: 8/19/2022 to 11/20/2022.     Outpatient Physical Therapy 2 times weekly for 8 weeks to include the following interventions: Cervical/Lumbar Traction, Electrical Stimulation ifc/premod, Manual Therapy, Moist Heat/ Ice, Neuromuscular Re-ed, Patient Education, Therapeutic Activities, Therapeutic Exercise and Ultrasound.     Continue per Plan of Care   Rosalinda Segura, PTA  12/2/2022

## 2022-12-09 RX ORDER — LISINOPRIL 5 MG/1
TABLET ORAL
Qty: 30 TABLET | Refills: 2 | Status: SHIPPED | OUTPATIENT
Start: 2022-12-09 | End: 2022-12-22 | Stop reason: SDUPTHER

## 2022-12-22 ENCOUNTER — OFFICE VISIT (OUTPATIENT)
Dept: FAMILY MEDICINE | Facility: CLINIC | Age: 57
End: 2022-12-22
Payer: COMMERCIAL

## 2022-12-22 VITALS
OXYGEN SATURATION: 99 % | WEIGHT: 256 LBS | TEMPERATURE: 98 F | HEIGHT: 65 IN | SYSTOLIC BLOOD PRESSURE: 130 MMHG | DIASTOLIC BLOOD PRESSURE: 80 MMHG | BODY MASS INDEX: 42.65 KG/M2 | HEART RATE: 62 BPM | RESPIRATION RATE: 18 BRPM

## 2022-12-22 DIAGNOSIS — K21.9 GASTROESOPHAGEAL REFLUX DISEASE, UNSPECIFIED WHETHER ESOPHAGITIS PRESENT: ICD-10-CM

## 2022-12-22 DIAGNOSIS — E55.9 VITAMIN D DEFICIENCY: ICD-10-CM

## 2022-12-22 DIAGNOSIS — K76.0 FATTY LIVER: ICD-10-CM

## 2022-12-22 DIAGNOSIS — R73.09 ELEVATED RANDOM BLOOD GLUCOSE LEVEL: Primary | ICD-10-CM

## 2022-12-22 DIAGNOSIS — I10 PRIMARY HYPERTENSION: ICD-10-CM

## 2022-12-22 PROBLEM — R73.9 ELEVATED RANDOM BLOOD GLUCOSE LEVEL: Status: ACTIVE | Noted: 2022-12-22

## 2022-12-22 PROBLEM — B34.9 VIRAL ILLNESS: Status: RESOLVED | Noted: 2022-11-17 | Resolved: 2022-12-22

## 2022-12-22 LAB
ALBUMIN SERPL BCP-MCNC: 3.8 G/DL (ref 3.5–5)
ALBUMIN/GLOB SERPL: 1 {RATIO}
ALP SERPL-CCNC: 82 U/L (ref 46–118)
ALT SERPL W P-5'-P-CCNC: 26 U/L (ref 13–56)
ANION GAP SERPL CALCULATED.3IONS-SCNC: 12 MMOL/L (ref 7–16)
AST SERPL W P-5'-P-CCNC: 14 U/L (ref 15–37)
BASOPHILS # BLD AUTO: 0.03 K/UL (ref 0–0.2)
BASOPHILS NFR BLD AUTO: 0.2 % (ref 0–1)
BILIRUB SERPL-MCNC: 0.2 MG/DL (ref ?–1.2)
BUN SERPL-MCNC: 18 MG/DL (ref 7–18)
BUN/CREAT SERPL: 21 (ref 6–20)
CALCIUM SERPL-MCNC: 9.7 MG/DL (ref 8.5–10.1)
CHLORIDE SERPL-SCNC: 106 MMOL/L (ref 98–107)
CO2 SERPL-SCNC: 26 MMOL/L (ref 21–32)
CREAT SERPL-MCNC: 0.87 MG/DL (ref 0.55–1.02)
DIFFERENTIAL METHOD BLD: ABNORMAL
EGFR (NO RACE VARIABLE) (RUSH/TITUS): 77 ML/MIN/1.73M²
EOSINOPHIL # BLD AUTO: 0.01 K/UL (ref 0–0.5)
EOSINOPHIL NFR BLD AUTO: 0.1 % (ref 1–4)
ERYTHROCYTE [DISTWIDTH] IN BLOOD BY AUTOMATED COUNT: 12.6 % (ref 11.5–14.5)
EST. AVERAGE GLUCOSE BLD GHB EST-MCNC: 97 MG/DL
GLOBULIN SER-MCNC: 3.7 G/DL (ref 2–4)
GLUCOSE SERPL-MCNC: 89 MG/DL (ref 74–106)
HBA1C MFR BLD HPLC: 5.5 % (ref 4.5–6.6)
HCT VFR BLD AUTO: 39.6 % (ref 38–47)
HGB BLD-MCNC: 13 G/DL (ref 12–16)
IMM GRANULOCYTES # BLD AUTO: 0.08 K/UL (ref 0–0.04)
IMM GRANULOCYTES NFR BLD: 0.5 % (ref 0–0.4)
LYMPHOCYTES # BLD AUTO: 1.76 K/UL (ref 1–4.8)
LYMPHOCYTES NFR BLD AUTO: 11.8 % (ref 27–41)
MCH RBC QN AUTO: 29.2 PG (ref 27–31)
MCHC RBC AUTO-ENTMCNC: 32.8 G/DL (ref 32–36)
MCV RBC AUTO: 89 FL (ref 80–96)
MONOCYTES # BLD AUTO: 0.8 K/UL (ref 0–0.8)
MONOCYTES NFR BLD AUTO: 5.4 % (ref 2–6)
MPC BLD CALC-MCNC: 11.1 FL (ref 9.4–12.4)
NEUTROPHILS # BLD AUTO: 12.18 K/UL (ref 1.8–7.7)
NEUTROPHILS NFR BLD AUTO: 82 % (ref 53–65)
NRBC # BLD AUTO: 0 X10E3/UL
NRBC, AUTO (.00): 0 %
PLATELET # BLD AUTO: 326 K/UL (ref 150–400)
POTASSIUM SERPL-SCNC: 4 MMOL/L (ref 3.5–5.1)
PROT SERPL-MCNC: 7.5 G/DL (ref 6.4–8.2)
RBC # BLD AUTO: 4.45 M/UL (ref 4.2–5.4)
SODIUM SERPL-SCNC: 140 MMOL/L (ref 136–145)
WBC # BLD AUTO: 14.86 K/UL (ref 4.5–11)

## 2022-12-22 PROCEDURE — 4010F ACE/ARB THERAPY RXD/TAKEN: CPT | Mod: CPTII,,, | Performed by: NURSE PRACTITIONER

## 2022-12-22 PROCEDURE — 3008F BODY MASS INDEX DOCD: CPT | Mod: CPTII,,, | Performed by: NURSE PRACTITIONER

## 2022-12-22 PROCEDURE — 80053 COMPREHENSIVE METABOLIC PANEL: ICD-10-PCS | Mod: ,,, | Performed by: CLINICAL MEDICAL LABORATORY

## 2022-12-22 PROCEDURE — 3008F PR BODY MASS INDEX (BMI) DOCUMENTED: ICD-10-PCS | Mod: CPTII,,, | Performed by: NURSE PRACTITIONER

## 2022-12-22 PROCEDURE — 83036 HEMOGLOBIN GLYCOSYLATED A1C: CPT | Mod: ,,, | Performed by: CLINICAL MEDICAL LABORATORY

## 2022-12-22 PROCEDURE — 85025 CBC WITH DIFFERENTIAL: ICD-10-PCS | Mod: ,,, | Performed by: CLINICAL MEDICAL LABORATORY

## 2022-12-22 PROCEDURE — 1159F MED LIST DOCD IN RCRD: CPT | Mod: CPTII,,, | Performed by: NURSE PRACTITIONER

## 2022-12-22 PROCEDURE — 99213 OFFICE O/P EST LOW 20 MIN: CPT | Mod: ,,, | Performed by: NURSE PRACTITIONER

## 2022-12-22 PROCEDURE — 1159F PR MEDICATION LIST DOCUMENTED IN MEDICAL RECORD: ICD-10-PCS | Mod: CPTII,,, | Performed by: NURSE PRACTITIONER

## 2022-12-22 PROCEDURE — 85025 COMPLETE CBC W/AUTO DIFF WBC: CPT | Mod: ,,, | Performed by: CLINICAL MEDICAL LABORATORY

## 2022-12-22 PROCEDURE — 3079F PR MOST RECENT DIASTOLIC BLOOD PRESSURE 80-89 MM HG: ICD-10-PCS | Mod: CPTII,,, | Performed by: NURSE PRACTITIONER

## 2022-12-22 PROCEDURE — 3075F SYST BP GE 130 - 139MM HG: CPT | Mod: CPTII,,, | Performed by: NURSE PRACTITIONER

## 2022-12-22 PROCEDURE — 3079F DIAST BP 80-89 MM HG: CPT | Mod: CPTII,,, | Performed by: NURSE PRACTITIONER

## 2022-12-22 PROCEDURE — 80053 COMPREHEN METABOLIC PANEL: CPT | Mod: ,,, | Performed by: CLINICAL MEDICAL LABORATORY

## 2022-12-22 PROCEDURE — 99213 PR OFFICE/OUTPT VISIT, EST, LEVL III, 20-29 MIN: ICD-10-PCS | Mod: ,,, | Performed by: NURSE PRACTITIONER

## 2022-12-22 PROCEDURE — 4010F PR ACE/ARB THEARPY RXD/TAKEN: ICD-10-PCS | Mod: CPTII,,, | Performed by: NURSE PRACTITIONER

## 2022-12-22 PROCEDURE — 83036 HEMOGLOBIN A1C: ICD-10-PCS | Mod: ,,, | Performed by: CLINICAL MEDICAL LABORATORY

## 2022-12-22 PROCEDURE — 3075F PR MOST RECENT SYSTOLIC BLOOD PRESS GE 130-139MM HG: ICD-10-PCS | Mod: CPTII,,, | Performed by: NURSE PRACTITIONER

## 2022-12-22 PROCEDURE — 1160F RVW MEDS BY RX/DR IN RCRD: CPT | Mod: CPTII,,, | Performed by: NURSE PRACTITIONER

## 2022-12-22 PROCEDURE — 1160F PR REVIEW ALL MEDS BY PRESCRIBER/CLIN PHARMACIST DOCUMENTED: ICD-10-PCS | Mod: CPTII,,, | Performed by: NURSE PRACTITIONER

## 2022-12-22 RX ORDER — PANTOPRAZOLE SODIUM 40 MG/1
40 TABLET, DELAYED RELEASE ORAL EVERY MORNING
Qty: 90 TABLET | Refills: 0 | Status: SHIPPED | OUTPATIENT
Start: 2022-12-22 | End: 2023-03-22 | Stop reason: SDUPTHER

## 2022-12-22 RX ORDER — LISINOPRIL 2.5 MG/1
2.5 TABLET ORAL 2 TIMES DAILY
COMMUNITY
Start: 2022-09-19 | End: 2022-12-22 | Stop reason: SDUPTHER

## 2022-12-22 RX ORDER — LISINOPRIL 2.5 MG/1
2.5 TABLET ORAL 2 TIMES DAILY
Qty: 90 TABLET | Refills: 0 | Status: SHIPPED | OUTPATIENT
Start: 2022-12-22 | End: 2023-03-22 | Stop reason: SDUPTHER

## 2022-12-22 RX ORDER — LANOLIN ALCOHOL/MO/W.PET/CERES
1 CREAM (GRAM) TOPICAL
COMMUNITY

## 2022-12-22 RX ORDER — ERGOCALCIFEROL 1.25 MG/1
CAPSULE ORAL
Qty: 14 CAPSULE | Refills: 0 | Status: SHIPPED | OUTPATIENT
Start: 2022-12-22 | End: 2024-03-05 | Stop reason: SDUPTHER

## 2022-12-22 RX ORDER — CEPHALEXIN 250 MG
CAPSULE ORAL
COMMUNITY

## 2022-12-22 RX ORDER — LISINOPRIL 5 MG/1
TABLET ORAL
Qty: 90 TABLET | Refills: 0 | Status: SHIPPED | OUTPATIENT
Start: 2022-12-22 | End: 2023-03-22 | Stop reason: SDUPTHER

## 2022-12-22 NOTE — ASSESSMENT & PLAN NOTE
Blood pressure well controlled. Continue current medications. Follow up in 3 months or as needed.

## 2022-12-22 NOTE — ASSESSMENT & PLAN NOTE
Continue ergocalciferol. Follow up in 3 months or as needed. Will need to recheck vit D in 3 months.

## 2022-12-22 NOTE — PROGRESS NOTES
Zahida Blanchard NP   [facility]  [address]  [place]  [address]      PATIENT NAME: Mally Woods  : 1965  DATE: 22  MRN: [id number]      Billing Provider: Zahida Blanchard NP  Level of Service: [place] OFFICE/OUTPT VISIT, EST, LEVL III, 20-29 MIN  Patient PCP Information       Provider PCP Type    [facility]            Reason for Visit / Chief Complaint: Hyperglycemia (Pt states her mother and sister was diagnosis with DM so she started checking her sugar in the morning. She states her fasting glucose the past two mornings was 126 and 130. Pt requesting an A1C)       Update PCP  Update Chief Complaint         History of Present Illness / Problem Focused Workflow     Mally Woods presents to the clinic with Hyperglycemia (Pt states her mother and sister was diagnosis with DM so she started checking her sugar in the morning. She states her fasting glucose the past two mornings was 126 and 130. Pt requesting an A1C)     57 year old female presents to clinic with complaints of elevated glucose. Patient states she has been checking her glucose randomly at home. She states the past two mornings it has been 126 and 130 fasting. She states her mother and her sister are diabetic. She denies any polyuria, polydipsia, or slow healing wounds.  She is requesting to have her A1C checked for screening purposes. She is requesting refill on her routine medications.       Review of Systems     @Review of Systems   Constitutional:  Negative for activity change, appetite change, fatigue and fever.   HENT:  Negative for nasal congestion, ear pain, rhinorrhea, sinus pressure/congestion and sore throat.    Eyes:  Negative for pain, redness, visual disturbance and eye dryness.   Respiratory:  Negative for cough and shortness of breath.    Cardiovascular:  Negative for chest pain and leg swelling.   Gastrointestinal:  Negative for abdominal distention, abdominal pain, constipation and  diarrhea.   Endocrine: Negative for cold intolerance, heat intolerance and polyuria.   Genitourinary:  Negative for bladder incontinence, dysuria, frequency and urgency.   Musculoskeletal:  Negative for arthralgias, gait problem and myalgias.   Integumentary:  Negative for color change, rash and wound.   Allergic/Immunologic: Negative for environmental allergies and food allergies.   Neurological:  Negative for dizziness, weakness, light-headedness and headaches.   Psychiatric/Behavioral:  Negative for behavioral problems and sleep disturbance.      Medical / Social / Family History     Past Medical History:   Diagnosis Date    Acute superficial gastritis without hemorrhage 10/21/2021    Anxiety     Aortic aneurysm 11/04/2020    w/o rupture    Cervical disc disorder     Chest pain in adult 10/21/2021    Depression     History of transient ischemic attack (TIA) 11/04/2020    Hyperlipidemia     Hypertension     Stroke     Thoracic aortic aneurysm     Vitamin deficiency        Past Surgical History:   Procedure Laterality Date    CARPAL TUNNEL RELEASE  12/21/2017    Dr. Moran with cyst removed from left middle finger    CHOLECYSTECTOMY      TOTAL REDUCTION MAMMOPLASTY         Social History  Ms.  reports that she has never smoked. She has been exposed to tobacco smoke. She has never used smokeless tobacco. She reports that she does not drink alcohol and does not use drugs.    Family History  Ms.'s family history includes Cervical cancer in her mother; Dementia in her father; Diabetes in her mother; Glaucoma in her mother; Hyperlipidemia in her mother; Hypertension in her father and mother; Pancreatic cancer in her mother.    Medications and Allergies     Medications  Outpatient Medications Marked as Taking for the 12/22/22 encounter (Office Visit) with Zahida Blanchard NP   Medication Sig Dispense Refill    cyanocobalamin (VITAMIN B-12) 1000 MCG tablet 1 tablet.      mecobalamin, vitamin B12, 1,000 mcg TbDL DISSOLVE 1  TABLET on the tongue ONCE DAILY 30 tablet 0    ondansetron (ZOFRAN-ODT) 4 MG TbDL Take 1 tablet (4 mg total) by mouth every 8 (eight) hours as needed (nausea). 20 tablet 0    [DISCONTINUED] ergocalciferol (ERGOCALCIFEROL) 50,000 unit Cap TAKE 1 CAPSULE BY MOUTH TWICE WEEKLY AS DIRECTED 14 capsule 0    [DISCONTINUED] lisinopriL (PRINIVIL,ZESTRIL) 2.5 MG tablet Take 2.5 mg by mouth 2 (two) times daily.      [DISCONTINUED] lisinopriL (PRINIVIL,ZESTRIL) 5 MG tablet TAKE ONE TABLET BY MOUTH ONCE DAILY FOR high blood PRESSURE 30 tablet 2    [DISCONTINUED] pantoprazole (PROTONIX) 40 MG tablet TAKE ONE TABLET BY MOUTH EVERY MORNING on an empty stomach 30 tablet 1       Allergies  Review of patient's allergies indicates:   Allergen Reactions    Betamethasone Other (See Comments)     Flushing    Fentanyl Other (See Comments)     Extreme sedation    Hydrochlorothiazide Other (See Comments)     Reaction unknown    Penicillins Hives     Can take rocephin    Promethazine hcl Other (See Comments)     Hallucinations    Sulfa (sulfonamide antibiotics) Itching    Hydromorphone hcl Palpitations    Latex Rash    Morphine Palpitations       Physical Examination     Vitals:    12/22/22 1314   BP: 130/80   Pulse: 62   Resp: 18   Temp: 98 °F (36.7 °C)     Physical Exam  Vitals and nursing note reviewed.   HENT:      Head: Normocephalic.      Right Ear: Tympanic membrane normal.      Left Ear: Tympanic membrane normal.      Nose: Nose normal.      Mouth/Throat:      Mouth: Mucous membranes are moist.      Pharynx: Oropharynx is clear. No posterior oropharyngeal erythema.   Eyes:      Conjunctiva/sclera: Conjunctivae normal.   Cardiovascular:      Rate and Rhythm: Normal rate and regular rhythm.      Pulses: Normal pulses.      Heart sounds: Normal heart sounds.   Pulmonary:      Effort: Pulmonary effort is normal.      Breath sounds: Normal breath sounds.   Abdominal:      General: Abdomen is flat. Bowel sounds are normal. There is no  distension.      Palpations: Abdomen is soft.   Musculoskeletal:         General: No swelling or tenderness. Normal range of motion.      Cervical back: Normal range of motion.      Right lower leg: No edema.      Left lower leg: No edema.   Skin:     General: Skin is warm and dry.      Capillary Refill: Capillary refill takes less than 2 seconds.   Neurological:      Mental Status: She is alert. Mental status is at baseline.   Psychiatric:         Mood and Affect: Mood normal.         Behavior: Behavior normal.             Lab Results   Component Value Date    WBC 6.54 11/19/2022    HGB 13.2 11/19/2022    HCT 42.9 11/19/2022    MCV 97.7 (H) 11/19/2022     11/19/2022          Sodium   Date Value Ref Range Status   11/19/2022 137 136 - 145 mmol/L Final     Potassium   Date Value Ref Range Status   11/19/2022 3.1 (L) 3.5 - 5.1 mmol/L Final     Chloride   Date Value Ref Range Status   11/19/2022 103 98 - 107 mmol/L Final     CO2   Date Value Ref Range Status   11/19/2022 29 21 - 32 mmol/L Final     Glucose   Date Value Ref Range Status   11/19/2022 103 74 - 106 mg/dL Final     BUN   Date Value Ref Range Status   11/19/2022 14 7 - 18 mg/dL Final     Creatinine   Date Value Ref Range Status   11/19/2022 1.00 0.55 - 1.02 mg/dL Final     Calcium   Date Value Ref Range Status   11/19/2022 8.1 (L) 8.5 - 10.1 mg/dL Final     Total Protein   Date Value Ref Range Status   11/19/2022 6.7 6.4 - 8.2 g/dL Final     Albumin   Date Value Ref Range Status   11/19/2022 3.4 (L) 3.5 - 5.0 g/dL Final     Bilirubin, Total   Date Value Ref Range Status   11/19/2022 0.3 >0.0 - 1.2 mg/dL Final     Alk Phos   Date Value Ref Range Status   11/19/2022 94 46 - 118 U/L Final     AST   Date Value Ref Range Status   11/19/2022 31 15 - 37 U/L Final     ALT   Date Value Ref Range Status   11/19/2022 46 13 - 56 U/L Final     Anion Gap   Date Value Ref Range Status   11/19/2022 8 7 - 16 mmol/L Final     eGFR    Date Value Ref  Range Status   03/23/2021 85  Final     eGFR   Date Value Ref Range Status   05/23/2022 81 >=60 mL/min/1.73m² Final      CT Chest Without Contrast  Narrative: EXAMINATION:  CT CHEST WITHOUT CONTRAST    CLINICAL HISTORY:  Aortic aneurysm, known or suspected;    TECHNIQUE:  Spiral CT sections were obtained through the lungs without contrast.  Sagittal and coronal multiplanar reconstruction images are also examined.    The CT examination was performed using one or more of the following dose reduction techniques: Automated exposure control, adjustment of the mA and kV according to patient's size, use of acute or iterative reconstruction techniques.    COMPARISON:  No previous similar chest CT available    FINDINGS:  There is some fusiform aneurysmal dilatation of the ascending aorta which measures 4.5 cm maximum diameter.  The aorta measures 4.2 cm at the proximal aortic arch level and 3.1 cm at the proximal descending thoracic aorta level.    There is no mediastinal mass or mediastinal lymphadenopathy.    There is no pleural or pericardial effusion.    Lungs are well expanded and clear.  Central airway is clear.    There is no definite acute process in the partially visualized upper abdomen.  Prior cholecystectomy.    There is no acute osseous abnormality.  There is mild thoracic spondylosis.  Impression: Mild aneurysmal dilatation of the ascending aorta at 4.5 cm maximum diameter.    No definite acute process    Electronically signed by: Christiano Lopez  Date:    11/04/2022  Time:    13:53  X-Ray Chest AP Portable  Narrative: EXAMINATION:  XR CHEST AP PORTABLE    CLINICAL HISTORY:  Chest Pain;    COMPARISON:  Chest x-ray September 11, 2022    TECHNIQUE:  Frontal view/views of the chest.    FINDINGS:  Continued cardiomegaly.  No focal consolidation, pleural effusion, or pneumothorax.  Visualized osseous and surrounding soft tissue structures appear grossly unchanged.  Impression: Continued cardiomegaly without evelyn  pulmonary edema or focal consolidating pneumonia.    Point of Service: [facility]    Electronically signed by: Pedro Angeles  Date:    11/04/2022  Time:    12:39     Procedures   Assessment and Plan (including Health Maintenance)      Problem List  Smart Sets  Document Outside HM   :    Plan:           Problem List Items Addressed This Visit          Cardiac/Vascular    Hypertension    Current Assessment & Plan     Blood pressure well controlled. Continue current medications. Follow up in 3 months or as needed.            Relevant Medications    lisinopriL (PRINIVIL,ZESTRIL) 5 MG tablet    lisinopriL (PRINIVIL,ZESTRIL) 2.5 MG tablet       Endocrine    Elevated random blood glucose level - Primary    Current Assessment & Plan     Will obtain HgbA1C today for screening purposes per patient's request. Will follow up with results.          Relevant Orders    Hemoglobin A1C    Vitamin D deficiency    Current Assessment & Plan     Continue ergocalciferol. Follow up in 3 months or as needed. Will need to recheck vit D in 3 months.          Relevant Medications    ergocalciferol (ERGOCALCIFEROL) 50,000 unit Cap       GI    Gastroesophageal reflux disease    Current Assessment & Plan     Well controlled on current med, continue as ordered. Follow up in 3 months or as needed.         Relevant Medications    pantoprazole (PROTONIX) 40 MG tablet    Fatty liver       Health Maintenance Topics with due status: Not Due       Topic Last Completion Date    Mammogram 03/24/2022    Lipid Panel 09/02/2022       Future Appointments   Date Time Provider Department Center   1/17/2023  8:00 AM Ilan Perez MD OB ORTHO [facility]   3/20/2023  9:00 AM [facility] US1 [facility] USIC [facility]   3/20/2023  9:45 AM MARY Fuller [facility] GASTR [facility]   5[facility]4/2023  [facility]        Health Maintenance Due   Topic Date Due    Cervical Cancer Screening  Never done    Pneumococcal Vaccines  (Age 0-64) (1 - PCV) Never done    TETANUS VACCINE  Never done    Hemoglobin A1c (Diabetic Prevention Screening)  Never done    Colorectal Cancer Screening  Never done    Influenza Vaccine (1) Never done        No follow-ups on file.     Signature:  Zahida Blanchard NP  [address]  [address]    Date of encounter: 12/22/22

## 2022-12-22 NOTE — ASSESSMENT & PLAN NOTE
Will obtain HgbA1C today for screening purposes per patient's request. Will follow up with results.

## 2022-12-23 NOTE — PROGRESS NOTES
Labs reviewed: Hgb A1C 5.5 which is normal and indicates a 3 month estimated average glucose of 97. Encourage low carb diet, increased activity to help lose weight.

## 2023-01-11 DIAGNOSIS — M25.512 LEFT SHOULDER PAIN, UNSPECIFIED CHRONICITY: Primary | ICD-10-CM

## 2023-01-17 ENCOUNTER — OFFICE VISIT (OUTPATIENT)
Dept: ORTHOPEDICS | Facility: CLINIC | Age: 58
End: 2023-01-17
Payer: COMMERCIAL

## 2023-01-17 ENCOUNTER — HOSPITAL ENCOUNTER (OUTPATIENT)
Dept: RADIOLOGY | Facility: HOSPITAL | Age: 58
Discharge: HOME | End: 2023-01-17
Attending: ORTHOPAEDIC SURGERY
Payer: COMMERCIAL

## 2023-01-17 VITALS — WEIGHT: 256 LBS | HEIGHT: 65 IN | BODY MASS INDEX: 42.65 KG/M2

## 2023-01-17 DIAGNOSIS — M25.512 LEFT SHOULDER PAIN, UNSPECIFIED CHRONICITY: ICD-10-CM

## 2023-01-17 DIAGNOSIS — M75.40 IMPINGEMENT SYNDROME OF SHOULDER REGION, UNSPECIFIED LATERALITY: ICD-10-CM

## 2023-01-17 DIAGNOSIS — M75.100 TEAR OF ROTATOR CUFF, UNSPECIFIED LATERALITY, UNSPECIFIED TEAR EXTENT, UNSPECIFIED WHETHER TRAUMATIC: Primary | ICD-10-CM

## 2023-01-17 PROCEDURE — 73030 X-RAY EXAM OF SHOULDER: CPT | Mod: 26,LT,, | Performed by: ORTHOPAEDIC SURGERY

## 2023-01-17 PROCEDURE — 73030 XR SHOULDER COMPLETE 2 OR MORE VIEWS LEFT: ICD-10-PCS | Mod: 26,LT,, | Performed by: ORTHOPAEDIC SURGERY

## 2023-01-17 PROCEDURE — 3008F BODY MASS INDEX DOCD: CPT | Mod: CPTII,,, | Performed by: ORTHOPAEDIC SURGERY

## 2023-01-17 PROCEDURE — 99204 PR OFFICE/OUTPT VISIT, NEW, LEVL IV, 45-59 MIN: ICD-10-PCS | Mod: S$PBB,,, | Performed by: ORTHOPAEDIC SURGERY

## 2023-01-17 PROCEDURE — 3008F PR BODY MASS INDEX (BMI) DOCUMENTED: ICD-10-PCS | Mod: CPTII,,, | Performed by: ORTHOPAEDIC SURGERY

## 2023-01-17 PROCEDURE — 4010F ACE/ARB THERAPY RXD/TAKEN: CPT | Mod: CPTII,,, | Performed by: ORTHOPAEDIC SURGERY

## 2023-01-17 PROCEDURE — 99204 OFFICE O/P NEW MOD 45 MIN: CPT | Mod: S$PBB,,, | Performed by: ORTHOPAEDIC SURGERY

## 2023-01-17 PROCEDURE — 99213 OFFICE O/P EST LOW 20 MIN: CPT | Mod: PBBFAC | Performed by: ORTHOPAEDIC SURGERY

## 2023-01-17 PROCEDURE — 73030 X-RAY EXAM OF SHOULDER: CPT | Mod: TC,LT

## 2023-01-17 PROCEDURE — 4010F PR ACE/ARB THEARPY RXD/TAKEN: ICD-10-PCS | Mod: CPTII,,, | Performed by: ORTHOPAEDIC SURGERY

## 2023-01-17 NOTE — PROGRESS NOTES
HPI:   Mally Woods is a pleasant 57 y.o. patient who reports to clinic for evaluation of left shoulder. Pt states she has been having neck painfor about 8 years.  Her left shoulder pain is new.   Injury onset and description: NONE  Patient's occupation: Accounting  This is not a work related injury.   she has not had formal physical therapy  she has not had previous shoulder injections.   she has had advanced imaging such as MRI.   The shoulder pain worsens with activity and overhead motion. Pain is disruptive to sleep at night. The pain is better with rest. Treatment thus far has included rest and activity modification. Here today to discuss diagnosis and treatment options.   VAS Pain Scale:  3  SANE Score:     PAST MEDICAL HISTORY:   Past Medical History:   Diagnosis Date    Acute superficial gastritis without hemorrhage 10/21/2021    Anxiety     Aortic aneurysm 11/04/2020    w/o rupture    Cervical disc disorder     Chest pain in adult 10/21/2021    Depression     GERD (gastroesophageal reflux disease)     History of transient ischemic attack (TIA) 11/04/2020    Hyperlipidemia     Hypertension     Stroke     Thoracic aortic aneurysm     Vitamin deficiency      PAST SURGICAL HISTORY:   Past Surgical History:   Procedure Laterality Date    CARDIAC CATHETERIZATION Left 07/31/2008    Performed by Dr. Bruce Cramer    CARPAL TUNNEL RELEASE  12/21/2017    Dr. Moran with cyst removed from left middle finger    CHOLECYSTECTOMY      COLONOSCOPY      DIAGNOSTIC LAPAROSCOPY      DILATION AND CURETTAGE OF UTERUS      EGD, WITH BALLOON DILATION  2018    Performed by Dr. Marc Lomax    TOTAL REDUCTION MAMMOPLASTY       MEDICATIONS:    Current Outpatient Medications:     cyanocobalamin (VITAMIN B-12) 1000 MCG tablet, 1 tablet., Disp: , Rfl:     ergocalciferol (ERGOCALCIFEROL) 50,000 unit Cap, TAKE 1 CAPSULE BY MOUTH TWICE WEEKLY AS DIRECTED, Disp: 14 capsule, Rfl: 0    lisinopriL (PRINIVIL,ZESTRIL) 2.5 MG tablet,  "Take 1 tablet (2.5 mg total) by mouth 2 (two) times daily., Disp: 90 tablet, Rfl: 0    lisinopriL (PRINIVIL,ZESTRIL) 5 MG tablet, TAKE ONE TABLET BY MOUTH ONCE DAILY FOR high blood PRESSURE, Disp: 90 tablet, Rfl: 0    mecobalamin, vitamin B12, 1,000 mcg TbDL, DISSOLVE 1 TABLET on the tongue ONCE DAILY, Disp: 30 tablet, Rfl: 0    ondansetron (ZOFRAN-ODT) 4 MG TbDL, Take 1 tablet (4 mg total) by mouth every 8 (eight) hours as needed (nausea)., Disp: 20 tablet, Rfl: 0    pantoprazole (PROTONIX) 40 MG tablet, Take 1 tablet (40 mg total) by mouth every morning., Disp: 90 tablet, Rfl: 0    vit c-ascorbate Ca-ascorb sod (VITAMIN C) 500 mg/15 mL Liqd, as directed, Disp: , Rfl:   ALLERGIES:   Review of patient's allergies indicates:   Allergen Reactions    Betamethasone Other (See Comments)     Flushing    Fentanyl Other (See Comments)     Extreme sedation    Hydrochlorothiazide Other (See Comments)     Reaction unknown    Penicillins Hives     Can take rocephin    Promethazine hcl Other (See Comments)     Hallucinations    Sulfa (sulfonamide antibiotics) Itching    Hydromorphone hcl Palpitations    Latex Rash    Morphine Palpitations     REVIEW OF SYSTEMS:  Constitution: Negative. Negative for chills, fever and night sweats. HENT: Negative for congestion and headaches.  Eyes: Negative for blurred vision, left vision loss and right vision loss. Cardiovascular: Negative for chest pain and syncope. Respiratory: Negative for cough and shortness of breath.       PHYSICAL EXAM:  VITAL SIGNS: Ht 5' 5" (1.651 m)   Wt 116.1 kg (256 lb)   LMP  (LMP Unknown)   BMI 42.60 kg/m²   General: Well-developed well-nourished 57 y.o. femalein no acute distress;Cardiovascular: Regular rhythm by palpation of distal pulse, normal color and temperature, no concerning varicosities on symptomatic side Lungs: No labored breathing or wheezing appreciated Neuro: Alert and oriented ×3 Psychiatric: well oriented to person, place and time, demonstrates " normal mood and affect Skin: No rashes, lesions or ulcers, normal temperature, turgor, and texture on uninvolved extremity    General    Constitutional: She is oriented to person, place, and time. She appears well-developed and well-nourished.   HENT:   Head: Normocephalic and atraumatic.   Eyes: Pupils are equal, round, and reactive to light.   Neck: Neck supple.   Cardiovascular:  Normal rate, regular rhythm and intact distal pulses.            Pulmonary/Chest: Effort normal. No respiratory distress. She exhibits no tenderness.   Abdominal: Soft. She exhibits no distension. There is no abdominal tenderness.   Neurological: She is alert and oriented to person, place, and time. She has normal reflexes. She displays normal reflexes. She exhibits normal muscle tone. Coordination normal.   Psychiatric: She has a normal mood and affect. Her behavior is normal. Judgment and thought content normal.         Right Shoulder Exam   Right shoulder exam is normal.    Left Shoulder Exam     Inspection/Observation   Swelling: present  Scapular Dyskinesia: positive    Tenderness   The patient is tender to palpation of the acromioclavicular joint and biceps tendon.    Crepitus   The patient has crepitus of the AC joint and greater tuberosity    Range of Motion   Active abduction:  abnormal   Passive abduction:  abnormal   Forward Flexion:  abnormal   Forward Elevation: abnormal    Tests & Signs   Cross arm: positive  Drop arm: negative  Lassiter test: positive  Impingement: positive  Sulcus: absent  Rotator Cuff Painful Arc/Range: mild  Anterosuperior Escape: negative  Lag Sign 0 degrees: negative  Lag Sign 90 degrees: negative  Lift Off Sign: positive  Belly Press: positive  Active Compression Test (Switzer's Sign): positive  Anterior Drawer Test: 0  Posterior Drawer Test: 0  Bear Hug: positive  Jerk Test: negative     Comments:  + Dynamic Labral Shear test  + Whipple Test that does not correct with scapular  stabilization      Muscle Strength   Left Upper Extremity  Shoulder Internal Rotation: 4/5   Shoulder External Rotation: 4/5   Supraspinatus: 4/5   Subscapularis: 4/5   Biceps: 4/5       IMAGING:  X-Ray Shoulder 2 or More Views Left    Result Date: 1/17/2023  See Procedure Notes for results. IMPRESSION: Please see Ortho procedure notes for report.  This procedure was auto-finalized by: Virtual Radiologist  Moderate acromioclavicular joint osteoarthritis.  Glenohumeral joint appears to be well-maintained.  MRI was reviewed there is a high-grade partial-thickness tear of the supraspinatus tendon measuring nearly 75% of the tendon thickness.  There was a high-grade SLAP tear also visualized with abundant fluid surrounding the biceps tendon.  Moderate acromioclavicular joint osteoarthritis and a type 2 acromion seen    ASSESSMENT:    No diagnosis found.    PLAN:     -Findings and treatment options were discussed with the patient  -All questions answered  Has hx of Thoracic Aneurysm-- it has been stable  Will need clearance  Severe allergy to Morphine reported    MRI findings were reviewed with the patient.  The patient has a symptomatic near full-thickness rotator cuff tear with associated impingement findings.  Muscle quality is well maintained.  Treatment options were reviewed to include both operative and nonoperative measures.  The patient has failed an initial course of conservative treatment.  Given the extent and duration of the patient's complaints, operative intervention is certainly reasonable at this point.  The details of arthroscopic rotator cuff repair with subacromial decompression, biceps tenotomy versus tenodesis, labral debridement, distal clavicle excision, and possible Regeneten patch augmentation were discussed.    The patient understands the likely convalescence after surgery, in particular the expected postop rehab and recovery course. Alternatives both operative and non-operative with associated  risks and benefits discussed. The outlined risks and potential complications of the proposed procedure include but are not limited to: infection, poor wound healing, scarring, stiffness, swelling, continued or recurrent pain, instability, hardware or prosthetic failure, symptomatic hardware requiring removal, weakness, neurovascular injury, numbness, chronic regional pain disorder, tissue nonhealing/irreparability/retear, contralateral ligament injury, chondral injury, arthritis, fracture, blood clot formation, inability to return to previous level of activity, anesthetic or regional block complication up to death, need for additional procedure as indicated, and potential need for further surgery.     she was also informed and understands the risks of surgery are greater for patients with a current condition or history of heart disease, obesity, clotting disorders, recurrent infections, steroid use, current or past smoking, and factors such as sedentary lifestyle and noncompliance with medications, therapy or follow-up. The degree of the increased risk is hard to estimate with any degree of precision.    All questions were answered. The patient has verbalized understanding of these issues and wishes to proceed as discussed. The patient understands that recovery time can be up to 6-12 months.  She wishes to do the surgery probably in April.  She is going to call us back if she wishes to change and move this date up.  All questions were answered.    There are no Patient Instructions on file for this visit.  No orders of the defined types were placed in this encounter.    Procedures

## 2023-01-23 NOTE — PLAN OF CARE
Physical Therapy Treatment Note      Name: Mally Woods  Clinic Number: [id number]     Therapy Diagnosis:        Encounter Diagnoses   Name Primary?    Cervical facet syndrome Yes    Decreased range of motion of neck        Physician: Rebeca Walter FNP     Visit Date: 12/2/2022     Physician Orders: PT Eval and Treat neck pain  Medical Diagnosis from Referral:M47.812 (ICD-10-CM) - Cervical facet syndrome   Evaluation Date: 8/19/2022  Authorization Period Expiration: 12/30/2022  Plan of Care Expiration: 11/20/2022  Visit # / Visits authorized: 12/ 20  PTA Visit #: 1     Time In: 245  Time Out: 323  Total Billable Time:38 minutes     Precautions: Standard     Subjective      Pt reports: I've been sick with the flu, my shoulder and neck are better. I think my purse was the problem.  She was compliant with home exercise program.  Response to previous treatment: no c/o     Pain: 2/10  Location: bilateral neck       Objective      Mally received therapeutic exercises to develop strength, ROM, flexibility, and posture for 30 minutes including:     UBE x 5 minutes   Pulleys x 4  minutes   Doorway pec stretching with bilateral rotation x 10 second hold   Bilateral side lying head lifts x 10  Supine chin tuck head lifts x 10   Sitting kathols red theraband x 10  Cervical stretches: rotation, lateral flexion, levator scapulae bilaterally at 10 second hold x 6 repetitions each  Bilateral sternocleidomastoid stretch x 10 repetitions      Cervical range of motion measures:    Rotation           Right 58 degrees        Left 55 degrees             Lateral tilt        Right 38 degrees        Left 38 degrees       Left shoulder flexion 155                         Abduction 140     Mally received the following direct contact modalities after being cleared for contraindications: Ultrasound:  Mally received ultrasound to manage pain and inflammation at 100 % duty cycle applied to the bilateral cervical paraspinals at an intensity  of  1.8 W/cm2/ 1 mhz  for a duration of 8 minutes. Patient tolerated treatment well without adverse effects. Therapist was in attendance throughout intervention.     Pt received ifc to middle back and neck at an intensity of x min with mhp .      Home Exercises Provided and Patient Education Provided      Education provided: continue current home exercise program     Written Home Exercises Provided: Patient instructed to cont prior HEP.  Exercises were reviewed and Mally was able to demonstrate them prior to the end of the session.  Mally demonstrated good  understanding of the education provided.      See EMR under Patient Instructions for exercises provided prior visit.     Assessment   Patient fatigued with including chin tuck to head lifts, patient had improved shoulder range of motion.  Mally Is progressing well towards her goals.   Pt prognosis is Good.      Pt will continue to benefit from skilled outpatient physical therapy to address the deficits listed in the problem list box on initial evaluation, provide pt/family education and to maximize pt's level of independence in the home and community environment.      Anticipated barriers to physical therapy: home exercise program compliance     Goals:  1. Patient will be independent with home exercise program-met  2. Patient will report decrease in pain at worse to 3/10 to improve quality of life-met  3. Patient will hold correct posture independently for 1 minute to improve ergonomic positioning at work     Plan     Outpatient Therapy Discharge Summary     Name: Mally Woods  Clinic Number: [id number]    Therapy Diagnosis:   Encounter Diagnoses   Name Primary?    Cervical facet syndrome Yes    Decreased range of motion of neck      Physician: Rebeca Walter FNP        Assessment    Goals:  Pt met goals     Discharge reason: Patient has met all of his/her goals    Plan   This patient is discharged from Physical Therapy.    Po Diaz, PT

## 2023-01-26 ENCOUNTER — PATIENT OUTREACH (OUTPATIENT)
Dept: ADMINISTRATIVE | Facility: HOSPITAL | Age: 58
End: 2023-01-26
Payer: COMMERCIAL

## 2023-02-28 ENCOUNTER — HOSPITAL ENCOUNTER (EMERGENCY)
Facility: HOSPITAL | Age: 58
Discharge: HOME | End: 2023-02-28
Attending: EMERGENCY MEDICINE
Payer: COMMERCIAL

## 2023-02-28 VITALS
DIASTOLIC BLOOD PRESSURE: 71 MMHG | TEMPERATURE: 99 F | OXYGEN SATURATION: 95 % | RESPIRATION RATE: 12 BRPM | SYSTOLIC BLOOD PRESSURE: 134 MMHG | HEART RATE: 67 BPM | BODY MASS INDEX: 43.94 KG/M2 | HEIGHT: 64 IN

## 2023-02-28 DIAGNOSIS — R07.9 CHEST PAIN: Primary | ICD-10-CM

## 2023-02-28 LAB
ALBUMIN SERPL BCP-MCNC: 3.6 G/DL (ref 3.5–5)
ALBUMIN/GLOB SERPL: 1.1 {RATIO}
ALP SERPL-CCNC: 92 U/L (ref 46–118)
ALT SERPL W P-5'-P-CCNC: 34 U/L (ref 13–56)
ANION GAP SERPL CALCULATED.3IONS-SCNC: 14 MMOL/L (ref 7–16)
APTT PPP: 27.2 SECONDS (ref 25.2–37.3)
AST SERPL W P-5'-P-CCNC: 24 U/L (ref 15–37)
BASOPHILS # BLD AUTO: 0.04 K/UL (ref 0–0.2)
BASOPHILS NFR BLD AUTO: 0.6 % (ref 0–1)
BILIRUB SERPL-MCNC: 0.4 MG/DL (ref ?–1.2)
BUN SERPL-MCNC: 13 MG/DL (ref 7–18)
BUN/CREAT SERPL: 17 (ref 6–20)
CALCIUM SERPL-MCNC: 9 MG/DL (ref 8.5–10.1)
CHLORIDE SERPL-SCNC: 105 MMOL/L (ref 98–107)
CO2 SERPL-SCNC: 24 MMOL/L (ref 21–32)
CREAT SERPL-MCNC: 0.78 MG/DL (ref 0.55–1.02)
DIFFERENTIAL METHOD BLD: ABNORMAL
EGFR (NO RACE VARIABLE) (RUSH/TITUS): 88 ML/MIN/1.73M²
EOSINOPHIL # BLD AUTO: 0.12 K/UL (ref 0–0.5)
EOSINOPHIL NFR BLD AUTO: 1.9 % (ref 1–4)
ERYTHROCYTE [DISTWIDTH] IN BLOOD BY AUTOMATED COUNT: 12.9 % (ref 11.5–14.5)
GLOBULIN SER-MCNC: 3.4 G/DL (ref 2–4)
GLUCOSE SERPL-MCNC: 102 MG/DL (ref 74–106)
HCT VFR BLD AUTO: 38.1 % (ref 38–47)
HGB BLD-MCNC: 12.5 G/DL (ref 12–16)
IMM GRANULOCYTES # BLD AUTO: 0.01 K/UL (ref 0–0.04)
IMM GRANULOCYTES NFR BLD: 0.2 % (ref 0–0.4)
INR BLD: 1
LYMPHOCYTES # BLD AUTO: 1.97 K/UL (ref 1–4.8)
LYMPHOCYTES NFR BLD AUTO: 30.6 % (ref 27–41)
MAGNESIUM SERPL-MCNC: 2 MG/DL (ref 1.7–2.3)
MCH RBC QN AUTO: 29.6 PG (ref 27–31)
MCHC RBC AUTO-ENTMCNC: 32.8 G/DL (ref 32–36)
MCV RBC AUTO: 90.1 FL (ref 80–96)
MONOCYTES # BLD AUTO: 0.44 K/UL (ref 0–0.8)
MONOCYTES NFR BLD AUTO: 6.8 % (ref 2–6)
MPC BLD CALC-MCNC: 10.3 FL (ref 9.4–12.4)
NEUTROPHILS # BLD AUTO: 3.86 K/UL (ref 1.8–7.7)
NEUTROPHILS NFR BLD AUTO: 59.9 % (ref 53–65)
NRBC # BLD AUTO: 0 X10E3/UL
NRBC, AUTO (.00): 0 %
PLATELET # BLD AUTO: 268 K/UL (ref 150–400)
POTASSIUM SERPL-SCNC: 4.2 MMOL/L (ref 3.5–5.1)
PROT SERPL-MCNC: 7 G/DL (ref 6.4–8.2)
PROTHROMBIN TIME: 12.8 SECONDS (ref 11.7–14.7)
RBC # BLD AUTO: 4.23 M/UL (ref 4.2–5.4)
SODIUM SERPL-SCNC: 139 MMOL/L (ref 136–145)
TROPONIN I SERPL HS-MCNC: 4.1 PG/ML
TROPONIN I SERPL HS-MCNC: <4 PG/ML
WBC # BLD AUTO: 6.44 K/UL (ref 4.5–11)

## 2023-02-28 PROCEDURE — 99285 EMERGENCY DEPT VISIT HI MDM: CPT | Mod: 25

## 2023-02-28 PROCEDURE — 85730 THROMBOPLASTIN TIME PARTIAL: CPT | Performed by: EMERGENCY MEDICINE

## 2023-02-28 PROCEDURE — 99284 PR EMERGENCY DEPT VISIT,LEVEL IV: ICD-10-PCS | Mod: ,,, | Performed by: EMERGENCY MEDICINE

## 2023-02-28 PROCEDURE — 99284 EMERGENCY DEPT VISIT MOD MDM: CPT | Mod: ,,, | Performed by: EMERGENCY MEDICINE

## 2023-02-28 PROCEDURE — 85610 PROTHROMBIN TIME: CPT | Performed by: EMERGENCY MEDICINE

## 2023-02-28 PROCEDURE — 84484 ASSAY OF TROPONIN QUANT: CPT | Performed by: EMERGENCY MEDICINE

## 2023-02-28 PROCEDURE — 83735 ASSAY OF MAGNESIUM: CPT | Performed by: EMERGENCY MEDICINE

## 2023-02-28 PROCEDURE — 93010 ELECTROCARDIOGRAM REPORT: CPT | Mod: ,,, | Performed by: HOSPITALIST

## 2023-02-28 PROCEDURE — 93010 EKG 12-LEAD: ICD-10-PCS | Mod: ,,, | Performed by: HOSPITALIST

## 2023-02-28 PROCEDURE — 85025 COMPLETE CBC W/AUTO DIFF WBC: CPT | Performed by: EMERGENCY MEDICINE

## 2023-02-28 PROCEDURE — 25000003 PHARM REV CODE 250: Performed by: EMERGENCY MEDICINE

## 2023-02-28 PROCEDURE — 80053 COMPREHEN METABOLIC PANEL: CPT | Performed by: EMERGENCY MEDICINE

## 2023-02-28 PROCEDURE — 93005 ELECTROCARDIOGRAM TRACING: CPT

## 2023-02-28 RX ORDER — LIDOCAINE HYDROCHLORIDE 20 MG/ML
15 SOLUTION OROPHARYNGEAL ONCE
Status: COMPLETED | OUTPATIENT
Start: 2023-02-28 | End: 2023-02-28

## 2023-02-28 RX ORDER — MAG HYDROX/ALUMINUM HYD/SIMETH 200-200-20
60 SUSPENSION, ORAL (FINAL DOSE FORM) ORAL ONCE
Status: COMPLETED | OUTPATIENT
Start: 2023-02-28 | End: 2023-02-28

## 2023-02-28 RX ADMIN — LIDOCAINE HYDROCHLORIDE 15 ML: 20 SOLUTION ORAL; TOPICAL at 01:02

## 2023-02-28 RX ADMIN — ALUMINUM HYDROXIDE, MAGNESIUM HYDROXIDE, AND SIMETHICONE 60 ML: 200; 200; 20 SUSPENSION ORAL at 01:02

## 2023-02-28 NOTE — DISCHARGE INSTRUCTIONS
MAKE SURE YOU ARE TAKING YOUR PROTONIX DAILY.  DRINK PLENTY OF FLUIDS.  AVOID NICOTINE, ALCOHOL, AND CAFFEINE.  FOLLOW UP WITH YOUR PRIMARY CARE PROVIDER OR RETURN TO THE EMERGENCY DEPARTMENT IF SYMPTOMS PERSIST OR WORSEN OR OTHERWISE AS NEEDED.

## 2023-02-28 NOTE — ED PROVIDER NOTES
Encounter Date: 2/28/2023    SCRIBE #1 NOTE: I, Jenni Rodriguez, am scribing for, and in the presence of,  Ruel Hanks MD. I have scribed the entire note.     History     Chief Complaint   Patient presents with    Nausea    Chest Pain     This is a 56 y/o white female, who presents to the ED with complaints of CP which started yesterday and has been constant but resolved 20 minutes after presenting to the ED. . She localizes the chest pain to the upper chest area. There is no radiation of the chest pain. She denies any worsening or alleviating factors. She states slight nausea but no vomiting. She denies any shortness of breath. Pt reports a known hx of aortic aneurysm. She notes she is followed by [facility]. Pt has a heart cath scheduled for March 23, 2023. There are no other complaints/pain in the ED at this time. Pt has a PMHX of HTN, hyperlipidemia, GERD, and stroke as well.     The history is provided by the patient. No  was used.   Review of patient's allergies indicates:   Allergen Reactions    Penicillins Hives and Rash     Can take rocephin  Other reaction(s): > 10 years ago  Can take rocephin      Betamethasone Other (See Comments)     Flushing  Flushing  Flushing    Celecoxib     Fentanyl Other (See Comments)     Extreme sedation  Other reaction(s): Other (See Comments)  Extreme sedation  Extreme sedation      Hydrochlorothiazide Other (See Comments)     Reaction unknown  Other reaction(s): Other (See Comments)  Reaction unknown  Reaction unknown    Promethazine hcl Other (See Comments)     Hallucinations    Hydromorphone hcl Palpitations    Latex Rash    Morphine Palpitations    Sulfa (sulfonamide antibiotics) Itching     Past Medical History:   Diagnosis Date    Acute superficial gastritis without hemorrhage 10/21/2021    Anxiety     Aortic aneurysm 11/04/2020    w/o rupture    Cervical disc disorder     Chest pain in adult 10/21/2021    Depression     GERD (gastroesophageal  reflux disease)     History of transient ischemic attack (TIA) 11/04/2020    Hyperlipidemia     Hypertension     Stroke     Thoracic aortic aneurysm     Vitamin deficiency      Past Surgical History:   Procedure Laterality Date    CARDIAC CATHETERIZATION Left 07/31/2008    Performed by Dr. Bruce Cramer    CARPAL TUNNEL RELEASE  12/21/2017    Dr. Moran with cyst removed from left middle finger    CHOLECYSTECTOMY      COLONOSCOPY      DIAGNOSTIC LAPAROSCOPY      DILATION AND CURETTAGE OF UTERUS      EGD, WITH BALLOON DILATION  2018    Performed by Dr. Marc Lomax    TOTAL REDUCTION MAMMOPLASTY       Family History   Problem Relation Age of Onset    Hyperlipidemia Mother     Hypertension Mother     Glaucoma Mother     Diabetes Mother     Cervical cancer Mother     Pancreatic cancer Mother     Hyperlipidemia Father     Hypertension Father     Dementia Father     No Known Problems Sister      Social History     Tobacco Use    Smoking status: Never     Passive exposure: Past    Smokeless tobacco: Never   Substance Use Topics    Alcohol use: Never    Drug use: Never     Review of Systems   Respiratory:  Negative for shortness of breath.    Cardiovascular:  Positive for chest pain (Resolved 20 minutes after arriving to the ED.).   Gastrointestinal:  Positive for nausea. Negative for vomiting.   All other systems reviewed and are negative.    Physical Exam     Initial Vitals [02/28/23 0834]   BP Pulse Resp Temp SpO2   135/73 65 18 99 °F (37.2 °C) 98 %      MAP       --         Physical Exam    Nursing note and vitals reviewed.  Constitutional: She appears well-developed and well-nourished.   HENT:   Head: Normocephalic and atraumatic.   Eyes: Conjunctivae and EOM are normal. Pupils are equal, round, and reactive to light.   Neck: Neck supple.   Normal range of motion.  Cardiovascular:  Normal rate, regular rhythm, normal heart sounds and intact distal pulses.           Pulmonary/Chest: Breath sounds normal.    Abdominal: Abdomen is soft. Bowel sounds are normal.   Musculoskeletal:         General: Normal range of motion.      Cervical back: Normal range of motion and neck supple.     Neurological: She is alert and oriented to person, place, and time. She has normal strength.   Skin: Skin is warm and dry. Capillary refill takes less than 2 seconds.   Psychiatric: She has a normal mood and affect. Thought content normal.       ED Course   Procedures  Labs Reviewed   CBC WITH DIFFERENTIAL - Abnormal; Notable for the following components:       Result Value    Monocytes % 6.8 (*)     All other components within normal limits   APTT - Normal   MAGNESIUM - Normal   PROTIME-INR - Normal   TROPONIN I - Normal   TROPONIN I - Normal   CBC W/ AUTO DIFFERENTIAL    Narrative:     The following orders were created for panel order CBC auto differential.  Procedure                               Abnormality         Status                     ---------                               -----------         ------                     CBC with Differential[[id number]]        Abnormal            Final result                 Please view results for these tests on the individual orders.   COMPREHENSIVE METABOLIC PANEL        ECG Results              EKG 12-lead (In process)  Result time 02/28/23 09:42:00      In process by Interface, Lab In [facility] (02/28/23 09:42:00)                   Narrative:    Test Reason : R07.9,    Vent. Rate : 062 BPM     Atrial Rate : 000 BPM     P-R Int : 180 ms          QRS Dur : 096 ms      QT Int : 400 ms       P-R-T Axes : 068 066 072 degrees     QTc Int : 404 ms    Sinus rhythm  Poor R wave progression  Septal T wave abnormality  is nonspecific  Borderline ECG      Referred By: [email or web address]   SELF           Confirmed By:                                   Imaging Results              X-Ray Chest AP Portable (Final result)  Result time 02/28/23 09:41:06      Final result by Christiano Lopez MD (02/28/23 09:41:06)                    Impression:      No acute cardiopulmonary process compared to the previous study      Electronically signed by: Christiano Lopez  Date:    02/28/2023  Time:    09:41               Narrative:    EXAMINATION:  XR CHEST AP PORTABLE    CLINICAL HISTORY:  .  Chest pain, unspecified    COMPARISON:  November 4, 2022    TECHNIQUE:  AP portable erect chest    FINDINGS:  There is mild cardiomegaly.  There is no mediastinal mass.  There is no pulmonary vascular engorgement.    Lungs and pleural spaces are clear.  There is no acute infiltrate.    There is no acute osseous abnormality                                    X-Rays:   Independently Interpreted Readings:   Chest X-Ray: Xray chest AP Portable:   No acute cardiopulmonary process compared to the previous study   Medications - No data to display  Medical Decision Making:   Initial Assessment:   56 Y/O FEMALE WITH ATYPICAL CHEST PAIN.  LEFT CHEST.  NO DYSPNEA.  NO NAUSEA.    Differential Diagnosis:   DDX:  CARDIAC CHEST PAIN VS GERD OR GASTRITIS VS OTHER  Clinical Tests:   Lab Tests: Ordered and Reviewed       <> Summary of Lab: REPEAT TROPONIN NORMAL.    Radiological Study: Ordered and Reviewed  Medical Tests: Ordered and Reviewed  ED Management:  DX: CHEST PAIN.  GERD MOST LIKELY.            Attending Attestation:           Physician Attestation for Scribe:  Physician Attestation Statement for Scribe #1: I, Ruel Hanks MD, reviewed documentation, as scribed by Jenni Rodriguez in my presence, and it is both accurate and complete.           ED Course as of 02/28/23 1315   Tue Feb 28, 2023   0945 Xray chest AP Portable:   No acute cardiopulmonary process compared to the previous study [BW]      ED Course User Index  [BW] Jenni Rodriguez                 Clinical Impression:   Final diagnoses:  [R07.9] Chest pain (Primary)        ED Disposition Condition    Discharge Stable          ED Prescriptions    None       Follow-up Information       Follow up With  Specialties Details Why Contact Info    Zahida Blanchard NP Family Medicine  As needed [address] [place] [address] [place] [address]  [phone]               Ruel Hanks MD  02/28/23 1313       Ruel Hanks MD  02/28/23 131

## 2023-03-01 ENCOUNTER — TELEPHONE (OUTPATIENT)
Dept: EMERGENCY MEDICINE | Facility: HOSPITAL | Age: 58
End: 2023-03-01
Payer: COMMERCIAL

## 2023-03-01 ENCOUNTER — TELEPHONE (OUTPATIENT)
Dept: GASTROENTEROLOGY | Facility: CLINIC | Age: 58
End: 2023-03-01
Payer: COMMERCIAL

## 2023-03-01 NOTE — TELEPHONE ENCOUNTER
Attempted to return call. Left message.          ----- Message from Yumiko Lara sent at 3/1/2023  8:28 AM CST -----  Pt called, went to ER with chest pain, it turned out to be really bad reflux, but she has question about the protonix doubling up on she seems to be getting worse with symptoms at night.

## 2023-03-02 ENCOUNTER — TELEPHONE (OUTPATIENT)
Dept: GASTROENTEROLOGY | Facility: CLINIC | Age: 58
End: 2023-03-02
Payer: COMMERCIAL

## 2023-03-02 NOTE — TELEPHONE ENCOUNTER
Patient called stating her reflux symptoms have been worse lately at night. Informed patient that after speaking with  Emily Mendoza NP she recommends adding pepcid complete at bedtime. Patient verbalized understanding.

## 2023-03-06 ENCOUNTER — HOSPITAL ENCOUNTER (OUTPATIENT)
Dept: RADIOLOGY | Facility: HOSPITAL | Age: 58
Discharge: HOME | End: 2023-03-06
Attending: OBSTETRICS & GYNECOLOGY
Payer: COMMERCIAL

## 2023-03-06 DIAGNOSIS — N93.9 ABNORMAL UTERINE BLEEDING (AUB): ICD-10-CM

## 2023-03-06 PROCEDURE — 76856 US PELVIS COMPLETE NON OB: ICD-10-PCS | Mod: 26,,, | Performed by: RADIOLOGY

## 2023-03-06 PROCEDURE — 76856 US EXAM PELVIC COMPLETE: CPT | Mod: 26,,, | Performed by: RADIOLOGY

## 2023-03-06 PROCEDURE — 76856 US EXAM PELVIC COMPLETE: CPT | Mod: TC

## 2023-03-08 ENCOUNTER — LAB VISIT (OUTPATIENT)
Dept: LAB | Facility: CLINIC | Age: 58
End: 2023-03-08
Payer: COMMERCIAL

## 2023-03-08 DIAGNOSIS — E78.5 HYPERLIPIDEMIA, UNSPECIFIED HYPERLIPIDEMIA TYPE: Primary | ICD-10-CM

## 2023-03-08 LAB
CHOLEST SERPL-MCNC: 190 MG/DL (ref 0–200)
CHOLEST/HDLC SERPL: 3.8 {RATIO}
HDLC SERPL-MCNC: 50 MG/DL (ref 40–60)
LDLC SERPL CALC-MCNC: 120 MG/DL
LDLC/HDLC SERPL: 2.4 {RATIO}
NONHDLC SERPL-MCNC: 140 MG/DL
TRIGL SERPL-MCNC: 98 MG/DL (ref 35–150)
VLDLC SERPL-MCNC: 20 MG/DL

## 2023-03-08 PROCEDURE — 80061 LIPID PANEL: CPT | Mod: ,,, | Performed by: CLINICAL MEDICAL LABORATORY

## 2023-03-08 PROCEDURE — 80061 LIPID PANEL: ICD-10-PCS | Mod: ,,, | Performed by: CLINICAL MEDICAL LABORATORY

## 2023-03-20 ENCOUNTER — HOSPITAL ENCOUNTER (OUTPATIENT)
Dept: RADIOLOGY | Facility: HOSPITAL | Age: 58
Discharge: HOME | End: 2023-03-20
Attending: NURSE PRACTITIONER
Payer: COMMERCIAL

## 2023-03-20 ENCOUNTER — OFFICE VISIT (OUTPATIENT)
Dept: GASTROENTEROLOGY | Facility: CLINIC | Age: 58
End: 2023-03-20
Payer: COMMERCIAL

## 2023-03-20 VITALS
HEART RATE: 65 BPM | HEIGHT: 64 IN | WEIGHT: 266.81 LBS | SYSTOLIC BLOOD PRESSURE: 129 MMHG | BODY MASS INDEX: 45.55 KG/M2 | OXYGEN SATURATION: 97 % | DIASTOLIC BLOOD PRESSURE: 52 MMHG

## 2023-03-20 DIAGNOSIS — K76.0 FATTY LIVER: ICD-10-CM

## 2023-03-20 DIAGNOSIS — K76.0 FATTY LIVER: Primary | ICD-10-CM

## 2023-03-20 DIAGNOSIS — K21.9 GASTROESOPHAGEAL REFLUX DISEASE, UNSPECIFIED WHETHER ESOPHAGITIS PRESENT: ICD-10-CM

## 2023-03-20 PROCEDURE — 1159F MED LIST DOCD IN RCRD: CPT | Mod: CPTII,,, | Performed by: NURSE PRACTITIONER

## 2023-03-20 PROCEDURE — 4010F PR ACE/ARB THEARPY RXD/TAKEN: ICD-10-PCS | Mod: CPTII,,, | Performed by: NURSE PRACTITIONER

## 2023-03-20 PROCEDURE — 3008F BODY MASS INDEX DOCD: CPT | Mod: CPTII,,, | Performed by: NURSE PRACTITIONER

## 2023-03-20 PROCEDURE — 76705 US ABDOMEN LIMITED: ICD-10-PCS | Mod: 26,,, | Performed by: RADIOLOGY

## 2023-03-20 PROCEDURE — 99214 PR OFFICE/OUTPT VISIT, EST, LEVL IV, 30-39 MIN: ICD-10-PCS | Mod: S$PBB,,, | Performed by: NURSE PRACTITIONER

## 2023-03-20 PROCEDURE — 1159F PR MEDICATION LIST DOCUMENTED IN MEDICAL RECORD: ICD-10-PCS | Mod: CPTII,,, | Performed by: NURSE PRACTITIONER

## 2023-03-20 PROCEDURE — 3078F DIAST BP <80 MM HG: CPT | Mod: CPTII,,, | Performed by: NURSE PRACTITIONER

## 2023-03-20 PROCEDURE — 3008F PR BODY MASS INDEX (BMI) DOCUMENTED: ICD-10-PCS | Mod: CPTII,,, | Performed by: NURSE PRACTITIONER

## 2023-03-20 PROCEDURE — 99214 OFFICE O/P EST MOD 30 MIN: CPT | Mod: S$PBB,,, | Performed by: NURSE PRACTITIONER

## 2023-03-20 PROCEDURE — 4010F ACE/ARB THERAPY RXD/TAKEN: CPT | Mod: CPTII,,, | Performed by: NURSE PRACTITIONER

## 2023-03-20 PROCEDURE — 3078F PR MOST RECENT DIASTOLIC BLOOD PRESSURE < 80 MM HG: ICD-10-PCS | Mod: CPTII,,, | Performed by: NURSE PRACTITIONER

## 2023-03-20 PROCEDURE — 76705 ECHO EXAM OF ABDOMEN: CPT | Mod: TC

## 2023-03-20 PROCEDURE — 76705 ECHO EXAM OF ABDOMEN: CPT | Mod: 26,,, | Performed by: RADIOLOGY

## 2023-03-20 PROCEDURE — 3074F SYST BP LT 130 MM HG: CPT | Mod: CPTII,,, | Performed by: NURSE PRACTITIONER

## 2023-03-20 PROCEDURE — 3074F PR MOST RECENT SYSTOLIC BLOOD PRESSURE < 130 MM HG: ICD-10-PCS | Mod: CPTII,,, | Performed by: NURSE PRACTITIONER

## 2023-03-20 PROCEDURE — 99214 OFFICE O/P EST MOD 30 MIN: CPT | Mod: PBBFAC,25 | Performed by: NURSE PRACTITIONER

## 2023-03-20 NOTE — PROGRESS NOTES
Mally Woods is a 57 y.o. female here for Follow-up        PCP: Zahida Blanchard  Referring Provider: No referring provider defined for this encounter.     HPI:  Presents for six-month follow-up due to fatty liver.  She did have a liver ultrasound this a.m..  Report reviewed.  Does show fatty liver.  No other hepatic lesions.  Last liver elastography June of 2022 report reviewed, F 0 to F1 Metavir score.  Lipid panel from 03/08/2022 reviewed, cholesterol is 198 which is improved, triglycerides are normal, HDL is 50, calculated LDL is 120. Patient was seen in the in the emergency room on 02/28/2023, due to atypical chest pain. Troponin was within normal limits, CBC did not show any anemia, and liver enzymes were within normal limits.  Hemoglobin A1c in December of 2022, 5.5.  The patient reports that she believes that the atypical chest pain was due to GERD she is currently taking Protonix 40 mg daily and also Pepcid complete as well.  Discussed repeat EGD with the patient but at this time she declines. Last EGD, 10/21/2021, report reviewed, minimal chronic gastritis, negative for H pylori.  Denies dysphagia. No hematochezia or melena. She is going to have a left heart catheterization at [facility] on this Thursday by Dr. Le. Last colonoscopy, 12/14/2015, report reviewed, no polyps were found at that time.  Recommendation for the patient to repeat in 10 years.  She will be due in 2025.    Follow-up  Associated symptoms include chest pain. Pertinent negatives include no abdominal pain, change in bowel habit, fatigue, fever, nausea or vomiting.       ROS:  Review of Systems   Constitutional:  Negative for appetite change, fatigue, fever and unexpected weight change.   HENT:  Negative for trouble swallowing.    Respiratory:  Negative for shortness of breath.    Cardiovascular:  Positive for chest pain.   Gastrointestinal:  Positive for reflux. Negative for abdominal pain, anal bleeding, blood in stool,  change in bowel habit, constipation, diarrhea, nausea, vomiting and change in bowel habit.   Musculoskeletal:  Negative for gait problem.   Integumentary:  Negative for pallor.   Neurological:  Negative for dizziness and light-headedness.   Hematological:  Does not bruise/bleed easily.   Psychiatric/Behavioral:  The patient is not nervous/anxious.         PMHX:  has a past medical history of Acute superficial gastritis without hemorrhage (10/21/2021), Anxiety, Aortic aneurysm (11/04/2020), Cervical disc disorder, Chest pain in adult (10/21/2021), Depression, GERD (gastroesophageal reflux disease), History of transient ischemic attack (TIA) (11/04/2020), Hyperlipidemia, Hypertension, Stroke, Thoracic aortic aneurysm, and Vitamin deficiency.    PSHX:  has a past surgical history that includes Cholecystectomy; Carpal tunnel release (12/21/2017); Total Reduction Mammoplasty; Cardiac catheterization (Left, 07/31/2008); Dilation and curettage of uterus; Colonoscopy; egd, with balloon dilation (2018); and Diagnostic laparoscopy.    PFHX: family history includes Cervical cancer in her mother; Dementia in her father; Diabetes in her mother; Glaucoma in her mother; Hyperlipidemia in her father and mother; Hypertension in her father and mother; No Known Problems in her sister; Pancreatic cancer in her mother.    PSlHX:  reports that she has never smoked. She has been exposed to tobacco smoke. She has never used smokeless tobacco. She reports that she does not drink alcohol and does not use drugs.        Review of patient's allergies indicates:   Allergen Reactions    Penicillins Hives and Rash     Can take rocephin  Other reaction(s): > 10 years ago  Can take rocephin      Betamethasone Other (See Comments)     Flushing  Flushing  Flushing    Celecoxib     Fentanyl Other (See Comments)     Extreme sedation  Other reaction(s): Other (See Comments)  Extreme sedation  Extreme sedation      Hydrochlorothiazide Other (See Comments)  "    Reaction unknown  Other reaction(s): Other (See Comments)  Reaction unknown  Reaction unknown    Promethazine hcl Other (See Comments)     Hallucinations    Hydromorphone hcl Palpitations    Latex Rash    Morphine Palpitations    Sulfa (sulfonamide antibiotics) Itching       Medication List with Changes/Refills   Current Medications    CYANOCOBALAMIN (VITAMIN B-12) 1000 MCG TABLET    1 tablet.    ERGOCALCIFEROL (ERGOCALCIFEROL) 50,000 UNIT CAP    TAKE 1 CAPSULE BY MOUTH TWICE WEEKLY AS DIRECTED    LISINOPRIL (PRINIVIL,ZESTRIL) 2.5 MG TABLET    Take 1 tablet (2.5 mg total) by mouth 2 (two) times daily.    LISINOPRIL (PRINIVIL,ZESTRIL) 5 MG TABLET    TAKE ONE TABLET BY MOUTH ONCE DAILY FOR high blood PRESSURE    MECOBALAMIN, VITAMIN B12, 1,000 MCG TBDL    DISSOLVE 1 TABLET on the tongue ONCE DAILY    ONDANSETRON (ZOFRAN-ODT) 4 MG TBDL    Take 1 tablet (4 mg total) by mouth every 8 (eight) hours as needed (nausea).    PANTOPRAZOLE (PROTONIX) 40 MG TABLET    Take 1 tablet (40 mg total) by mouth every morning.    VIT C-ASCORBATE CA-ASCORB SOD (VITAMIN C) 500 MG/15 ML LIQD    as directed        Objective Findings:  Vital Signs:  BP (!) 129/52   Pulse 65   Ht 5' 4" (1.626 m)   Wt 121 kg (266 lb 12.8 oz)   LMP  (LMP Unknown)   SpO2 97%   BMI 45.80 kg/m²  Body mass index is 45.8 kg/m².    Physical Exam:  Physical Exam  Vitals and nursing note reviewed.   Constitutional:       General: She is not in acute distress.     Appearance: Normal appearance.   HENT:      Mouth/Throat:      Mouth: Mucous membranes are moist.   Cardiovascular:      Rate and Rhythm: Normal rate.   Pulmonary:      Effort: Pulmonary effort is normal.      Breath sounds: No wheezing, rhonchi or rales.   Abdominal:      General: Bowel sounds are normal. There is no distension.      Palpations: Abdomen is soft. There is no mass.      Tenderness: There is no abdominal tenderness.   Skin:     General: Skin is warm and dry.      Coloration: Skin is not " jaundiced or pale.   Neurological:      Mental Status: She is alert and oriented to person, place, and time.   Psychiatric:         Mood and Affect: Mood normal.        Labs:  Lab Results   Component Value Date    WBC 6.44 02/28/2023    HGB 12.5 02/28/2023    HCT 38.1 02/28/2023    MCV 90.1 02/28/2023    RDW 12.9 02/28/2023     02/28/2023    LYMPH 30.6 02/28/2023    LYMPH 1.97 02/28/2023    MONO 6.8 (H) 02/28/2023    EOS 0.12 02/28/2023    BASO 0.04 02/28/2023     Lab Results   Component Value Date     02/28/2023    K 4.2 02/28/2023     02/28/2023    CO2 24 02/28/2023     02/28/2023    BUN 13 02/28/2023    CREATININE 0.78 02/28/2023    CALCIUM 9.0 02/28/2023    PROT 7.0 02/28/2023    ALBUMIN 3.6 02/28/2023    BILITOT 0.4 02/28/2023    ALKPHOS 92 02/28/2023    AST 24 02/28/2023    ALT 34 02/28/2023         Imaging: US Pelvis Complete Non OB    Result Date: 3/6/2023  EXAMINATION: US PELVIS COMPLETE NON OB CLINICAL HISTORY: Abnormal uterine and vaginal bleeding, unspecified TECHNIQUE: Grayscale and color Doppler imaging performed COMPARISON: None. FINDINGS: The uterine length is 8.4 cm. Endometrial thickness is 8.0 mm. No abnormal echogenicity is seen. The right ovary length is 2.6 cm. The left ovary length is 2.5 cm. No focal abnormality seen. No free fluid is identified     No abnormality demonstrated. Electronically signed by: Ronen Huerta Date:    03/06/2023 Time:    15:55    X-Ray Chest AP Portable    Result Date: 2/28/2023  EXAMINATION: XR CHEST AP PORTABLE CLINICAL HISTORY: .  Chest pain, unspecified COMPARISON: November 4, 2022 TECHNIQUE: AP portable erect chest FINDINGS: There is mild cardiomegaly.  There is no mediastinal mass.  There is no pulmonary vascular engorgement. Lungs and pleural spaces are clear.  There is no acute infiltrate. There is no acute osseous abnormality     No acute cardiopulmonary process compared to the previous study Electronically signed by: Christiano  Jessica Date:    02/28/2023 Time:    09:41    US Abdomen Limited    Result Date: 3/20/2023  EXAMINATION: US ABDOMEN LIMITED CLINICAL HISTORY: Fatty (change of) liver, not elsewhere classified TECHNIQUE: Limited ultrasound of the right upper quadrant of the abdomen  was performed.  Ultrasound images captured and stored. COMPARISON: None. FINDINGS: Liver: Normal in size, measuring 20.4 cm. Fatty liver infiltration. No focal hepatic lesions. Gallbladder: The gallbladder is surgically absent. Biliary system: The common duct is not dilated, measuring 6.8 mm.  No intrahepatic ductal dilatation. Right kidney: Normal in size and echotexture, measuring 12.3 cm.  Simple cyst lower pole. Miscellaneous: No upper abdominal ascites.     Fatty infiltration of the liver. Electronically signed by: Po Valente Date:    03/20/2023 Time:    09:35        Assessment:  Mally Woods is a 57 y.o. female here with:  1. Fatty liver    2. Gastroesophageal reflux disease, unspecified whether esophagitis present          Recommendations:  1. Exercise 150 minutes per week  Weight loss of 7-10%. Weight loss should be gradual  Diet low in saturated fats and carbohydrates  Good glucose and cholesterol control  2. Continue Protonix 40 mg daily and Pepcid Complete   3. Avoid spicy, greasy foods  Avoid caffeine, citric acid, chocolate, peppermint, and carbonated drinks  Do not lay down within 3 hours of eating  Exercise 150 minutes per week  Increase fluid to 64 ounces daily  Avoid antiinflammatory medications such as motrin, advil, aleve, ibuprofen, and BC powder  4. May may call back if she needs to schedule EGD due to ongoing reflux      Follow up in about 6 months (around 9/20/2023).      Order summary:  Orders Placed This Encounter    US Elastography Liver w/imaging       Thank you for allowing me to participate in the care of Mally Woods.      JOSE Melgar

## 2023-03-22 ENCOUNTER — OFFICE VISIT (OUTPATIENT)
Dept: FAMILY MEDICINE | Facility: CLINIC | Age: 58
End: 2023-03-22
Payer: COMMERCIAL

## 2023-03-22 VITALS
HEIGHT: 64 IN | HEART RATE: 73 BPM | BODY MASS INDEX: 45.62 KG/M2 | RESPIRATION RATE: 18 BRPM | SYSTOLIC BLOOD PRESSURE: 132 MMHG | DIASTOLIC BLOOD PRESSURE: 78 MMHG | OXYGEN SATURATION: 98 % | TEMPERATURE: 98 F | WEIGHT: 267.19 LBS

## 2023-03-22 DIAGNOSIS — J01.40 ACUTE NON-RECURRENT PANSINUSITIS: Primary | ICD-10-CM

## 2023-03-22 DIAGNOSIS — K21.9 GASTROESOPHAGEAL REFLUX DISEASE, UNSPECIFIED WHETHER ESOPHAGITIS PRESENT: ICD-10-CM

## 2023-03-22 DIAGNOSIS — I10 PRIMARY HYPERTENSION: ICD-10-CM

## 2023-03-22 PROCEDURE — 3078F PR MOST RECENT DIASTOLIC BLOOD PRESSURE < 80 MM HG: ICD-10-PCS | Mod: CPTII,,, | Performed by: NURSE PRACTITIONER

## 2023-03-22 PROCEDURE — 87428 POCT SARS-COV2 (COVID) WITH FLU ANTIGEN: ICD-10-PCS | Mod: QW,,, | Performed by: NURSE PRACTITIONER

## 2023-03-22 PROCEDURE — 96372 PR INJECTION,THERAP/PROPH/DIAG2ST, IM OR SUBCUT: ICD-10-PCS | Mod: ,,, | Performed by: NURSE PRACTITIONER

## 2023-03-22 PROCEDURE — 1159F PR MEDICATION LIST DOCUMENTED IN MEDICAL RECORD: ICD-10-PCS | Mod: CPTII,,, | Performed by: NURSE PRACTITIONER

## 2023-03-22 PROCEDURE — 3078F DIAST BP <80 MM HG: CPT | Mod: CPTII,,, | Performed by: NURSE PRACTITIONER

## 2023-03-22 PROCEDURE — 1159F MED LIST DOCD IN RCRD: CPT | Mod: CPTII,,, | Performed by: NURSE PRACTITIONER

## 2023-03-22 PROCEDURE — 4010F PR ACE/ARB THEARPY RXD/TAKEN: ICD-10-PCS | Mod: CPTII,,, | Performed by: NURSE PRACTITIONER

## 2023-03-22 PROCEDURE — 96372 THER/PROPH/DIAG INJ SC/IM: CPT | Mod: ,,, | Performed by: NURSE PRACTITIONER

## 2023-03-22 PROCEDURE — 99213 PR OFFICE/OUTPT VISIT, EST, LEVL III, 20-29 MIN: ICD-10-PCS | Mod: 25,,, | Performed by: NURSE PRACTITIONER

## 2023-03-22 PROCEDURE — 1160F PR REVIEW ALL MEDS BY PRESCRIBER/CLIN PHARMACIST DOCUMENTED: ICD-10-PCS | Mod: CPTII,,, | Performed by: NURSE PRACTITIONER

## 2023-03-22 PROCEDURE — 3075F SYST BP GE 130 - 139MM HG: CPT | Mod: CPTII,,, | Performed by: NURSE PRACTITIONER

## 2023-03-22 PROCEDURE — 3008F BODY MASS INDEX DOCD: CPT | Mod: CPTII,,, | Performed by: NURSE PRACTITIONER

## 2023-03-22 PROCEDURE — 4010F ACE/ARB THERAPY RXD/TAKEN: CPT | Mod: CPTII,,, | Performed by: NURSE PRACTITIONER

## 2023-03-22 PROCEDURE — 3075F PR MOST RECENT SYSTOLIC BLOOD PRESS GE 130-139MM HG: ICD-10-PCS | Mod: CPTII,,, | Performed by: NURSE PRACTITIONER

## 2023-03-22 PROCEDURE — 3008F PR BODY MASS INDEX (BMI) DOCUMENTED: ICD-10-PCS | Mod: CPTII,,, | Performed by: NURSE PRACTITIONER

## 2023-03-22 PROCEDURE — 99213 OFFICE O/P EST LOW 20 MIN: CPT | Mod: 25,,, | Performed by: NURSE PRACTITIONER

## 2023-03-22 PROCEDURE — 87428 SARSCOV & INF VIR A&B AG IA: CPT | Mod: QW,,, | Performed by: NURSE PRACTITIONER

## 2023-03-22 PROCEDURE — 1160F RVW MEDS BY RX/DR IN RCRD: CPT | Mod: CPTII,,, | Performed by: NURSE PRACTITIONER

## 2023-03-22 RX ORDER — ESOMEPRAZOLE MAGNESIUM 40 MG/1
1 CAPSULE, DELAYED RELEASE ORAL
COMMUNITY
End: 2023-05-19

## 2023-03-22 RX ORDER — LISINOPRIL 2.5 MG/1
2.5 TABLET ORAL 2 TIMES DAILY
Qty: 90 TABLET | Refills: 1 | Status: SHIPPED | OUTPATIENT
Start: 2023-03-22 | End: 2023-08-14 | Stop reason: SDUPTHER

## 2023-03-22 RX ORDER — ROSUVASTATIN CALCIUM 5 MG/1
1 TABLET, COATED ORAL
COMMUNITY
End: 2024-03-05

## 2023-03-22 RX ORDER — PANTOPRAZOLE SODIUM 40 MG/1
40 TABLET, DELAYED RELEASE ORAL EVERY MORNING
Qty: 90 TABLET | Refills: 1 | Status: SHIPPED | OUTPATIENT
Start: 2023-03-22 | End: 2023-05-26 | Stop reason: HOSPADM

## 2023-03-22 RX ORDER — CHLOPHEDIANOL HCL AND PYRILAMINE MALEATE 12.5; 12.5 MG/5ML; MG/5ML
10 SOLUTION ORAL EVERY 8 HOURS PRN
Qty: 240 ML | Refills: 0 | Status: SHIPPED | OUTPATIENT
Start: 2023-03-22 | End: 2024-03-05

## 2023-03-22 RX ORDER — CEFTRIAXONE 1 G/1
1 INJECTION, POWDER, FOR SOLUTION INTRAMUSCULAR; INTRAVENOUS
Status: COMPLETED | OUTPATIENT
Start: 2023-03-22 | End: 2023-03-22

## 2023-03-22 RX ORDER — LISINOPRIL 5 MG/1
TABLET ORAL
Qty: 90 TABLET | Refills: 1 | Status: SHIPPED | OUTPATIENT
Start: 2023-03-22 | End: 2023-08-14 | Stop reason: SDUPTHER

## 2023-03-22 RX ORDER — ESCITALOPRAM OXALATE 5 MG/1
2 TABLET ORAL
COMMUNITY
End: 2024-03-05

## 2023-03-22 RX ORDER — AZITHROMYCIN 250 MG/1
TABLET, FILM COATED ORAL
Qty: 6 TABLET | Refills: 0 | Status: SHIPPED | OUTPATIENT
Start: 2023-03-22 | End: 2023-03-27

## 2023-03-22 RX ORDER — METHYLPREDNISOLONE ACETATE 40 MG/ML
40 INJECTION, SUSPENSION INTRA-ARTICULAR; INTRALESIONAL; INTRAMUSCULAR; SOFT TISSUE
Status: COMPLETED | OUTPATIENT
Start: 2023-03-22 | End: 2023-03-22

## 2023-03-22 RX ADMIN — CEFTRIAXONE 1 G: 1 INJECTION, POWDER, FOR SOLUTION INTRAMUSCULAR; INTRAVENOUS at 11:03

## 2023-03-22 RX ADMIN — METHYLPREDNISOLONE ACETATE 40 MG: 40 INJECTION, SUSPENSION INTRA-ARTICULAR; INTRALESIONAL; INTRAMUSCULAR; SOFT TISSUE at 11:03

## 2023-03-22 NOTE — LETTER
March 22, 2023      [facility]  [address]  [place] [address]  Phone: [phone]  Fax: [phone]       Patient: Mally Woods   YOB: 1965  Date of Visit: 03/22/2023    To Whom It May Concern:    Moises Woods  was at [facility] on 03/22/2023. The patient may return to work/school on 3/23/23 with no restrictions. If you have any questions or concerns, or if I can be of further assistance, please do not hesitate to contact me.    Sincerely,    Zahida Blanchard NP

## 2023-03-23 ENCOUNTER — HOSPITAL ENCOUNTER (EMERGENCY)
Facility: HOSPITAL | Age: 58
Discharge: HOME | End: 2023-03-23
Attending: FAMILY MEDICINE
Payer: COMMERCIAL

## 2023-03-23 VITALS
DIASTOLIC BLOOD PRESSURE: 67 MMHG | TEMPERATURE: 99 F | BODY MASS INDEX: 43.36 KG/M2 | RESPIRATION RATE: 15 BRPM | SYSTOLIC BLOOD PRESSURE: 146 MMHG | HEIGHT: 64 IN | OXYGEN SATURATION: 97 % | HEART RATE: 67 BPM | WEIGHT: 254 LBS

## 2023-03-23 DIAGNOSIS — R07.89 ATYPICAL CHEST PAIN: Primary | ICD-10-CM

## 2023-03-23 DIAGNOSIS — R07.9 CHEST PAIN: ICD-10-CM

## 2023-03-23 DIAGNOSIS — R06.02 SHORTNESS OF BREATH: ICD-10-CM

## 2023-03-23 LAB
ALBUMIN SERPL BCP-MCNC: 3.9 G/DL (ref 3.5–5)
ALBUMIN/GLOB SERPL: 1.3 {RATIO}
ALP SERPL-CCNC: 122 U/L (ref 46–118)
ALT SERPL W P-5'-P-CCNC: 47 U/L (ref 13–56)
ANION GAP SERPL CALCULATED.3IONS-SCNC: 13 MMOL/L (ref 7–16)
AST SERPL W P-5'-P-CCNC: 30 U/L (ref 15–37)
BASOPHILS # BLD AUTO: 0.06 K/UL (ref 0–0.2)
BASOPHILS NFR BLD AUTO: 0.9 % (ref 0–1)
BILIRUB SERPL-MCNC: 0.2 MG/DL (ref ?–1.2)
BUN SERPL-MCNC: 15 MG/DL (ref 7–18)
BUN/CREAT SERPL: 20 (ref 6–20)
CALCIUM SERPL-MCNC: 9.6 MG/DL (ref 8.5–10.1)
CHLORIDE SERPL-SCNC: 104 MMOL/L (ref 98–107)
CO2 SERPL-SCNC: 29 MMOL/L (ref 21–32)
CREAT SERPL-MCNC: 0.76 MG/DL (ref 0.55–1.02)
DIFFERENTIAL METHOD BLD: ABNORMAL
EGFR (NO RACE VARIABLE) (RUSH/TITUS): 91 ML/MIN/1.73M²
EOSINOPHIL # BLD AUTO: 0.24 K/UL (ref 0–0.5)
EOSINOPHIL NFR BLD AUTO: 3.6 % (ref 1–4)
ERYTHROCYTE [DISTWIDTH] IN BLOOD BY AUTOMATED COUNT: 12.6 % (ref 11.5–14.5)
GLOBULIN SER-MCNC: 2.9 G/DL (ref 2–4)
GLUCOSE SERPL-MCNC: 122 MG/DL (ref 74–106)
HCT VFR BLD AUTO: 35.8 % (ref 38–47)
HGB BLD-MCNC: 11.9 G/DL (ref 12–16)
IMM GRANULOCYTES # BLD AUTO: 0.04 K/UL (ref 0–0.04)
IMM GRANULOCYTES NFR BLD: 0.6 % (ref 0–0.4)
INR BLD: 0.94
LYMPHOCYTES # BLD AUTO: 1.44 K/UL (ref 1–4.8)
LYMPHOCYTES NFR BLD AUTO: 21.9 % (ref 27–41)
MCH RBC QN AUTO: 29.3 PG (ref 27–31)
MCHC RBC AUTO-ENTMCNC: 33.2 G/DL (ref 32–36)
MCV RBC AUTO: 88.2 FL (ref 80–96)
MONOCYTES # BLD AUTO: 0.63 K/UL (ref 0–0.8)
MONOCYTES NFR BLD AUTO: 9.6 % (ref 2–6)
MPC BLD CALC-MCNC: 10.4 FL (ref 9.4–12.4)
NEUTROPHILS # BLD AUTO: 4.18 K/UL (ref 1.8–7.7)
NEUTROPHILS NFR BLD AUTO: 63.4 % (ref 53–65)
NRBC # BLD AUTO: 0 X10E3/UL
NRBC, AUTO (.00): 0 %
PLATELET # BLD AUTO: 271 K/UL (ref 150–400)
POTASSIUM SERPL-SCNC: 4.1 MMOL/L (ref 3.5–5.1)
PROT SERPL-MCNC: 6.8 G/DL (ref 6.4–8.2)
PROTHROMBIN TIME: 12.2 SECONDS (ref 11.7–14.7)
RBC # BLD AUTO: 4.06 M/UL (ref 4.2–5.4)
SODIUM SERPL-SCNC: 142 MMOL/L (ref 136–145)
TROPONIN I SERPL HS-MCNC: <4 PG/ML
WBC # BLD AUTO: 6.59 K/UL (ref 4.5–11)

## 2023-03-23 PROCEDURE — 93010 EKG 12-LEAD: ICD-10-PCS | Mod: ,,, | Performed by: HOSPITALIST

## 2023-03-23 PROCEDURE — 93005 ELECTROCARDIOGRAM TRACING: CPT

## 2023-03-23 PROCEDURE — 84484 ASSAY OF TROPONIN QUANT: CPT | Performed by: FAMILY MEDICINE

## 2023-03-23 PROCEDURE — 99283 PR EMERGENCY DEPT VISIT,LEVEL III: ICD-10-PCS | Mod: ,,, | Performed by: FAMILY MEDICINE

## 2023-03-23 PROCEDURE — 80053 COMPREHEN METABOLIC PANEL: CPT | Performed by: FAMILY MEDICINE

## 2023-03-23 PROCEDURE — 99285 EMERGENCY DEPT VISIT HI MDM: CPT | Mod: 25

## 2023-03-23 PROCEDURE — 85025 COMPLETE CBC W/AUTO DIFF WBC: CPT | Performed by: FAMILY MEDICINE

## 2023-03-23 PROCEDURE — 85610 PROTHROMBIN TIME: CPT | Performed by: FAMILY MEDICINE

## 2023-03-23 PROCEDURE — 25500020 PHARM REV CODE 255: Performed by: FAMILY MEDICINE

## 2023-03-23 PROCEDURE — 96372 THER/PROPH/DIAG INJ SC/IM: CPT | Performed by: FAMILY MEDICINE

## 2023-03-23 PROCEDURE — 93010 ELECTROCARDIOGRAM REPORT: CPT | Mod: ,,, | Performed by: HOSPITALIST

## 2023-03-23 PROCEDURE — 99283 EMERGENCY DEPT VISIT LOW MDM: CPT | Mod: ,,, | Performed by: FAMILY MEDICINE

## 2023-03-23 PROCEDURE — 63600175 PHARM REV CODE 636 W HCPCS: Performed by: FAMILY MEDICINE

## 2023-03-23 RX ORDER — ORPHENADRINE CITRATE 30 MG/ML
60 INJECTION INTRAMUSCULAR; INTRAVENOUS
Status: DISCONTINUED | OUTPATIENT
Start: 2023-03-23 | End: 2023-03-23 | Stop reason: HOSPADM

## 2023-03-23 RX ORDER — BENZONATATE 200 MG/1
200 CAPSULE ORAL 3 TIMES DAILY PRN
Qty: 30 CAPSULE | Refills: 1 | Status: SHIPPED | OUTPATIENT
Start: 2023-03-23 | End: 2023-04-22

## 2023-03-23 RX ADMIN — IOPAMIDOL 100 ML: 755 INJECTION, SOLUTION INTRAVENOUS at 04:03

## 2023-03-23 NOTE — ED TRIAGE NOTES
PATIENT PRESENTS TO ER WITH COMPLAINT OF CHEST PAIN S/P COUGHING. PATIENT HAS A HX OF THORACIC ANEURYSM

## 2023-03-23 NOTE — ED PROVIDER NOTES
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  But with the type of pain she is having chills will make sure it is okay.  No nausea vomiting or diarrhea      Review of patient's allergies indicates:   Allergen Reactions    Penicillins Hives and Rash     Can take rocephin  Other reaction(s): > 10 years ago  Can take rocephin      Celecoxib     Fentanyl Other (See Comments)     Extreme sedation  Other reaction(s): Other (See Comments)  Extreme sedation  Extreme sedation      Hydrochlorothiazide Other (See Comments)     Reaction unknown  Other reaction(s): Other (See Comments)  Reaction unknown  Reaction unknown    Promethazine hcl Other (See Comments)     Hallucinations    Betamethasone Other (See Comments) and Rash     Flushing  Flushing  Flushing    Hydromorphone hcl Palpitations    Latex Rash    Morphine Palpitations    Sulfa (sulfonamide antibiotics) Itching     Past Medical History:   Diagnosis Date    Acute superficial gastritis without hemorrhage 10/21/2021    Anxiety     Aortic aneurysm 11/04/2020    w/o rupture    Cervical disc disorder     Chest pain in adult 10/21/2021    Depression     GERD (gastroesophageal reflux disease)     History of transient ischemic attack (TIA) 11/04/2020    Hyperlipidemia     Hypertension     Stroke     Thoracic aortic aneurysm     Vitamin deficiency      Past Surgical History:   Procedure Laterality Date    CARDIAC CATHETERIZATION Left 07/31/2008    Performed by Dr. Bruce Cramer    CARPAL TUNNEL RELEASE  12/21/2017    Dr. Moran with cyst removed from left middle finger    CHOLECYSTECTOMY      COLONOSCOPY      DIAGNOSTIC LAPAROSCOPY      DILATION AND CURETTAGE OF UTERUS       EGD, WITH BALLOON DILATION  2018    Performed by Dr. Marc Lomax    TOTAL REDUCTION MAMMOPLASTY       Family History   Problem Relation Age of Onset    Hyperlipidemia Mother     Hypertension Mother     Glaucoma Mother     Diabetes Mother     Cervical cancer Mother     Pancreatic cancer Mother     Hyperlipidemia Father     Hypertension Father     Dementia Father     No Known Problems Sister      Social History     Tobacco Use    Smoking status: Never     Passive exposure: Past    Smokeless tobacco: Never   Substance Use Topics    Alcohol use: Never    Drug use: Never     Review of Systems   Constitutional: Negative.  Negative for fever.   HENT: Negative.  Negative for sore throat.    Eyes: Negative.    Respiratory: Negative.  Negative for shortness of breath.    Cardiovascular: Negative.  Negative for chest pain.   Gastrointestinal: Negative.  Negative for nausea.   Endocrine: Negative.    Genitourinary: Negative.  Negative for dysuria.   Musculoskeletal: Negative.  Negative for back pain.   Skin: Negative.  Negative for rash.   Allergic/Immunologic: Negative.    Neurological: Negative.  Negative for weakness.   Hematological: Negative.  Does not bruise/bleed easily.   Psychiatric/Behavioral: Negative.     All other systems reviewed and are negative.    Physical Exam     Initial Vitals [03/23/23 1528]   BP Pulse Resp Temp SpO2   (!) 180/81 85 18 99 °F (37.2 °C) 98 %      MAP       --         Physical Exam    Constitutional: She appears well-developed and well-nourished.   HENT:   Head: Normocephalic and atraumatic.   Right Ear: External ear normal.   Left Ear: External ear normal.   Nose: Nose normal.   Mouth/Throat: Oropharynx is clear and moist.   Eyes: Conjunctivae and EOM are normal. Pupils are equal, round, and reactive to light.   Neck: Neck supple.   Normal range of motion.  Cardiovascular:  Normal rate, regular rhythm, normal heart sounds and intact distal pulses.           Pulmonary/Chest: Breath sounds  normal.   Abdominal: Abdomen is soft. Bowel sounds are normal.   Genitourinary:    Vagina and uterus normal.     Musculoskeletal:         General: Normal range of motion.      Cervical back: Normal range of motion and neck supple.     Neurological: She is alert and oriented to person, place, and time. She has normal strength and normal reflexes.   Skin: Skin is warm. Capillary refill takes less than 2 seconds.   Psychiatric: She has a normal mood and affect. Her behavior is normal. Judgment and thought content normal.       Medical Screening Exam   See Full Note    ED Course   Procedures  Labs Reviewed   COMPREHENSIVE METABOLIC PANEL - Abnormal; Notable for the following components:       Result Value    Glucose 122 (*)     Alk Phos 122 (*)     All other components within normal limits   CBC WITH DIFFERENTIAL - Abnormal; Notable for the following components:    RBC 4.06 (*)     Hemoglobin 11.9 (*)     Hematocrit 35.8 (*)     Lymphocytes % 21.9 (*)     Monocytes % 9.6 (*)     Immature Granulocytes % 0.6 (*)     All other components within normal limits   TROPONIN I - Normal   PROTIME-INR - Normal   CBC W/ AUTO DIFFERENTIAL    Narrative:     The following orders were created for panel order CBC auto differential.  Procedure                               Abnormality         Status                     ---------                               -----------         ------                     CBC with Differential[[id number]]        Abnormal            Final result                 Please view results for these tests on the individual orders.        ECG Results              EKG 12-lead (Final result)  Result time 04/05/23 00:59:39      Final result by Interface, Lab In [facility] (04/05/23 00:59:39)                   Narrative:    Test Reason : R06.02,    Vent. Rate : 071 BPM     Atrial Rate : 000 BPM     P-R Int : 170 ms          QRS Dur : 096 ms      QT Int : 390 ms       P-R-T Axes : 063 071 071 degrees     QTc Int : 409  ms    Sinus rhythm  Normal ECG    Confirmed by Rad Cardozo MD ([id number]) on 4/5/2023 12:59:22 AM    Referred By: [email or web address]   SELF           Confirmed By:Rad Cardozo MD                                  Imaging Results              CTA Chest Aorta Non Coronary (Final result)  Result time 03/23/23 17:11:48   Procedure changed from CT Chest With Contrast     Final result by Po Valente MD (03/23/23 17:11:48)                   Impression:      No aortic aneurysm or aortic abnormality identified in the chest.    No acute abnormality in the chest.    Fatty infiltration of the liver.      Electronically signed by: Po Valente  Date:    03/23/2023  Time:    17:11               Narrative:    EXAMINATION:  CTA CHEST AORTA NON CORONARY    CLINICAL HISTORY:  Aortic aneurysm, known or suspected;    TECHNIQUE:  CT angiogram of the chest performed with intravenous contrast.  3D MIP reconstructions were created and reviewed.  100 mL Isovue 370 utilized.    COMPARISON:  Chest radiograph 03/23/2023.    FINDINGS:  There is no thoracic aortic aneurysm.  No dissection.  No significant atherosclerotic calcification of the thoracic aorta.  The heart is normal size.  No adenopathy in the chest.    The pulmonary arteries appear normal with no filling defects identified.    The lungs are clear bilaterally.  No pneumothorax.  No pleural effusion.    No fracture identified.    Diffuse fatty infiltration seen of the liver.  No acute abnormality of the upper abdomen.                                       X-Ray Chest PA And Lateral (Final result)  Result time 03/23/23 16:16:02      Final result by Pedro Angeles DO (03/23/23 16:16:02)                   Impression:      No acute cardiopulmonary process demonstrated.    Point of Service: [facility]      Electronically signed by: Pedro Angeles  Date:    03/23/2023  Time:    16:16               Narrative:    EXAMINATION:  XR CHEST PA AND LATERAL    CLINICAL HISTORY:  Chest  Pain;    COMPARISON:  Chest x-ray February 28, 2023    TECHNIQUE:  Frontal view/views of the chest.    FINDINGS:  The cardiomediastinal silhouette is stable in configuration.  Chronic change of the lungs without focal consolidation, pleural effusion, or pneumothorax.  Visualized osseous and surrounding soft tissue structures appear grossly unchanged.                                       Medications   iopamidoL (ISOVUE-370) injection 100 mL (100 mLs Intravenous Given 3/23/23 9010)     Medical Decision Making:   Initial Assessment:   Again patient comes in with severe midsternal chest pain right over where her AAA is in her thoracic region no nausea vomiting diarrhea  Differential Diagnosis:   1. Bronchitis.  2. Costochondritis.  The                 Clinical Impression:   Final diagnoses:  [R07.9] Chest pain  [R06.02] Shortness of breath  [R07.89] Atypical chest pain (Primary)        ED Disposition Condition    Discharge Stable          ED Prescriptions       Medication Sig Dispense Start Date End Date Auth. Provider    benzonatate (TESSALON) 200 MG capsule Take 1 capsule (200 mg total) by mouth 3 (three) times daily as needed for Cough. 30 capsule 3/23/2023 4/22/2023 Rigo Mansfield DO          Follow-up Information    None          Rigo Mansfield,   04/08/23 9217

## 2023-03-23 NOTE — ASSESSMENT & PLAN NOTE
Rocephin and Depomedrol given IM in clinic.   Zithromax as ordered and Ninjacof as needed.    Reviewed pathology of current symptoms, medication side effects/risk/benefits/directions on taking medications, and worsening or persistent symptoms that require follow up in next 2-3 days. Saline/steroid nasal sprays, antihistamine use, increase fluid intake, and multivitamin/elderberry/Zinc use were recommended. May take Tylenol or Motrin as needed for pain and/or fever. Patient was instructed to take antibiotic as directed, complete entire course to avoid antibiotic resistance, and take OTC probiotic with antibiotic to prevent GI upset. Patient verbalized understanding of treatment plan and denies any questions.

## 2023-05-04 ENCOUNTER — HOSPITAL ENCOUNTER (OUTPATIENT)
Dept: RADIOLOGY | Facility: HOSPITAL | Age: 58
Discharge: HOME | End: 2023-05-04
Attending: RADIOLOGY
Payer: COMMERCIAL

## 2023-05-04 DIAGNOSIS — Z12.31 VISIT FOR SCREENING MAMMOGRAM: ICD-10-CM

## 2023-05-04 PROCEDURE — 77067 MAMMO DIGITAL SCREENING BILAT: ICD-10-PCS | Mod: 26,,, | Performed by: RADIOLOGY

## 2023-05-04 PROCEDURE — 77067 SCR MAMMO BI INCL CAD: CPT | Mod: TC

## 2023-05-04 PROCEDURE — 77067 SCR MAMMO BI INCL CAD: CPT | Mod: 26,,, | Performed by: RADIOLOGY

## 2023-05-16 ENCOUNTER — OFFICE VISIT (OUTPATIENT)
Dept: FAMILY MEDICINE | Facility: CLINIC | Age: 58
End: 2023-05-16
Payer: COMMERCIAL

## 2023-05-16 VITALS
HEART RATE: 61 BPM | TEMPERATURE: 98 F | BODY MASS INDEX: 45.24 KG/M2 | OXYGEN SATURATION: 99 % | DIASTOLIC BLOOD PRESSURE: 88 MMHG | SYSTOLIC BLOOD PRESSURE: 126 MMHG | HEIGHT: 64 IN | RESPIRATION RATE: 18 BRPM | WEIGHT: 265 LBS

## 2023-05-16 DIAGNOSIS — M54.50 ACUTE MIDLINE LOW BACK PAIN WITHOUT SCIATICA: Primary | ICD-10-CM

## 2023-05-16 DIAGNOSIS — E66.01 MORBID (SEVERE) OBESITY DUE TO EXCESS CALORIES: ICD-10-CM

## 2023-05-16 LAB
BILIRUB SERPL-MCNC: NEGATIVE MG/DL
BLOOD URINE, POC: NEGATIVE
COLOR, POC UA: YELLOW
GLUCOSE UR QL STRIP: NEGATIVE
KETONES UR QL STRIP: NEGATIVE
LEUKOCYTE ESTERASE URINE, POC: NEGATIVE
NITRITE, POC UA: NEGATIVE
PH, POC UA: 5
PROTEIN, POC: NEGATIVE
SPECIFIC GRAVITY, POC UA: 1.01
UROBILINOGEN, POC UA: 0.2

## 2023-05-16 PROCEDURE — 3079F DIAST BP 80-89 MM HG: CPT | Mod: CPTII,,, | Performed by: NURSE PRACTITIONER

## 2023-05-16 PROCEDURE — 1160F PR REVIEW ALL MEDS BY PRESCRIBER/CLIN PHARMACIST DOCUMENTED: ICD-10-PCS | Mod: CPTII,,, | Performed by: NURSE PRACTITIONER

## 2023-05-16 PROCEDURE — 3079F PR MOST RECENT DIASTOLIC BLOOD PRESSURE 80-89 MM HG: ICD-10-PCS | Mod: CPTII,,, | Performed by: NURSE PRACTITIONER

## 2023-05-16 PROCEDURE — 3074F PR MOST RECENT SYSTOLIC BLOOD PRESSURE < 130 MM HG: ICD-10-PCS | Mod: CPTII,,, | Performed by: NURSE PRACTITIONER

## 2023-05-16 PROCEDURE — 3008F BODY MASS INDEX DOCD: CPT | Mod: CPTII,,, | Performed by: NURSE PRACTITIONER

## 2023-05-16 PROCEDURE — 1160F RVW MEDS BY RX/DR IN RCRD: CPT | Mod: CPTII,,, | Performed by: NURSE PRACTITIONER

## 2023-05-16 PROCEDURE — 3074F SYST BP LT 130 MM HG: CPT | Mod: CPTII,,, | Performed by: NURSE PRACTITIONER

## 2023-05-16 PROCEDURE — 81003 POCT URINALYSIS W/O SCOPE: ICD-10-PCS | Mod: QW,,, | Performed by: NURSE PRACTITIONER

## 2023-05-16 PROCEDURE — 3008F PR BODY MASS INDEX (BMI) DOCUMENTED: ICD-10-PCS | Mod: CPTII,,, | Performed by: NURSE PRACTITIONER

## 2023-05-16 PROCEDURE — 4010F ACE/ARB THERAPY RXD/TAKEN: CPT | Mod: CPTII,,, | Performed by: NURSE PRACTITIONER

## 2023-05-16 PROCEDURE — 4010F PR ACE/ARB THEARPY RXD/TAKEN: ICD-10-PCS | Mod: CPTII,,, | Performed by: NURSE PRACTITIONER

## 2023-05-16 PROCEDURE — 99213 PR OFFICE/OUTPT VISIT, EST, LEVL III, 20-29 MIN: ICD-10-PCS | Mod: ,,, | Performed by: NURSE PRACTITIONER

## 2023-05-16 PROCEDURE — 1159F PR MEDICATION LIST DOCUMENTED IN MEDICAL RECORD: ICD-10-PCS | Mod: CPTII,,, | Performed by: NURSE PRACTITIONER

## 2023-05-16 PROCEDURE — 81003 URINALYSIS AUTO W/O SCOPE: CPT | Mod: QW,,, | Performed by: NURSE PRACTITIONER

## 2023-05-16 PROCEDURE — 99213 OFFICE O/P EST LOW 20 MIN: CPT | Mod: ,,, | Performed by: NURSE PRACTITIONER

## 2023-05-16 PROCEDURE — 1159F MED LIST DOCD IN RCRD: CPT | Mod: CPTII,,, | Performed by: NURSE PRACTITIONER

## 2023-05-16 RX ORDER — BACLOFEN 10 MG/1
10 TABLET ORAL 3 TIMES DAILY
Qty: 90 TABLET | Refills: 1 | Status: SHIPPED | OUTPATIENT
Start: 2023-05-16 | End: 2023-06-12

## 2023-05-16 NOTE — ASSESSMENT & PLAN NOTE
UA clear. Patient reports she has history of degenerative disc disease and fibromyalgia. Pain may be related to this. She is seen by pain management and has received injections in past. She denies any sciatica. She does have some spasms in lwoer back. We will treat with Baclofen.   Patient was instructed to rest, take medications as directed, alternate ice/moist heat to area, and monitor for worsening symptoms that require immediate evaluation. Patient was instructed to follow up if symptoms persist past current treatment plan to discuss further options, such as physical therapy, referral to ortho or other diagnostic imaging. Medication side effects/risk/benefits/directions on taking medications were reviewed with patient. Patient verbalized understanding of treatment plan and denies any questions.

## 2023-05-16 NOTE — PROGRESS NOTES
Zahida Blanchard NP   [facility]  [address]  [place]  [address]      PATIENT NAME: Mally Woods  : 1965  DATE: 23  MRN: [id number]      Billing Provider: Zahida Blanchard NP  Level of Service:   Patient PCP Information       Provider PCP Type    Zahida Blanchard NP General            Reason for Visit / Chief Complaint: Low-back Pain (X5 days. Lower back pain. Has been drinking cranberry juice but it has not helped. ) and Nausea (Started today. )         History of Present Illness / Problem Focused Workflow     Mally Woods presents to the clinic with Low-back Pain (X5 days. Lower back pain. Has been drinking cranberry juice but it has not helped. ) and Nausea (Started today. )     57 year old female presents to clinic with complaints of low back pain x 5 days and nausea that started today. She states she wants to make sure it is not UTI. Denies urgency, frequency, or burning with urination.       Review of Systems     @Review of Systems   Constitutional:  Negative for activity change, appetite change, fatigue and fever.   HENT:  Negative for nasal congestion, ear pain, rhinorrhea, sinus pressure/congestion and sore throat.    Eyes:  Negative for pain, redness, visual disturbance and eye dryness.   Respiratory:  Negative for cough and shortness of breath.    Cardiovascular:  Negative for chest pain and leg swelling.   Gastrointestinal:  Negative for abdominal distention, abdominal pain, constipation and diarrhea.   Endocrine: Negative for cold intolerance, heat intolerance and polyuria.   Genitourinary:  Negative for bladder incontinence, frequency and urgency.   Musculoskeletal:  Positive for back pain. Negative for arthralgias, gait problem and myalgias.   Integumentary:  Negative for color change, rash and wound.   Allergic/Immunologic: Negative for environmental allergies and food allergies.   Neurological:  Negative for dizziness, weakness, light-headedness and  headaches.   Psychiatric/Behavioral:  Negative for behavioral problems and sleep disturbance.      Medical / Social / Family History     Past Medical History:   Diagnosis Date    Acute superficial gastritis without hemorrhage 10/21/2021    Anxiety     Aortic aneurysm 11/04/2020    w/o rupture    Cervical disc disorder     Chest pain in adult 10/21/2021    Depression     GERD (gastroesophageal reflux disease)     History of transient ischemic attack (TIA) 11/04/2020    Hyperlipidemia     Hypertension     Stroke     Thoracic aortic aneurysm     Vitamin deficiency        Past Surgical History:   Procedure Laterality Date    CARDIAC CATHETERIZATION Left 07/31/2008    Performed by Dr. Bruce Cramer    CARPAL TUNNEL RELEASE  12/21/2017    Dr. Moran with cyst removed from left middle finger    CHOLECYSTECTOMY      COLONOSCOPY      DIAGNOSTIC LAPAROSCOPY      DILATION AND CURETTAGE OF UTERUS      EGD, WITH BALLOON DILATION  2018    Performed by Dr. Marc Lomax    TOTAL REDUCTION MAMMOPLASTY         Social History  Ms.  reports that she has never smoked. She has been exposed to tobacco smoke. She has never used smokeless tobacco. She reports that she does not drink alcohol and does not use drugs.    Family History  Ms.'s family history includes Cervical cancer in her mother; Dementia in her father; Diabetes in her mother; Glaucoma in her mother; Hyperlipidemia in her father and mother; Hypertension in her father and mother; No Known Problems in her sister; Pancreatic cancer in her mother.    Medications and Allergies     Medications  Outpatient Medications Marked as Taking for the 5/16/23 encounter (Office Visit) with Zahida Blanchard NP   Medication Sig Dispense Refill    cyanocobalamin (VITAMIN B-12) 1000 MCG tablet 1 tablet.      ergocalciferol (ERGOCALCIFEROL) 50,000 unit Cap TAKE 1 CAPSULE BY MOUTH TWICE WEEKLY AS DIRECTED 14 capsule 0    lisinopriL (PRINIVIL,ZESTRIL) 2.5 MG tablet Take 1 tablet (2.5 mg total) by  mouth 2 (two) times daily. 90 tablet 1    lisinopriL (PRINIVIL,ZESTRIL) 5 MG tablet TAKE ONE TABLET BY MOUTH ONCE DAILY FOR high blood PRESSURE 90 tablet 1    mecobalamin, vitamin B12, 1,000 mcg TbDL DISSOLVE 1 TABLET on the tongue ONCE DAILY 30 tablet 0    ondansetron (ZOFRAN-ODT) 4 MG TbDL Take 1 tablet (4 mg total) by mouth every 8 (eight) hours as needed (nausea). 20 tablet 0    pantoprazole (PROTONIX) 40 MG tablet Take 1 tablet (40 mg total) by mouth every morning. 90 tablet 1    vit c-ascorbate Ca-ascorb sod (VITAMIN C) 500 mg/15 mL Liqd as directed         Allergies  Review of patient's allergies indicates:   Allergen Reactions    Penicillins Hives and Rash     Can take rocephin  Other reaction(s): > 10 years ago  Can take rocephin      Celecoxib     Ciprofloxacin      States she can not take them due to aneurism     Fentanyl Other (See Comments)     Extreme sedation  Other reaction(s): Other (See Comments)  Extreme sedation  Extreme sedation      Hydrochlorothiazide Other (See Comments)     Reaction unknown  Other reaction(s): Other (See Comments)  Reaction unknown  Reaction unknown    Promethazine hcl Other (See Comments)     Hallucinations    Betamethasone Other (See Comments) and Rash     Flushing  Flushing  Flushing    Hydromorphone hcl Palpitations    Latex Rash    Morphine Palpitations    Sulfa (sulfonamide antibiotics) Itching       Physical Examination     Vitals:    05/16/23 1350   BP: 126/88   Pulse: 61   Resp: 18   Temp: 98.2 °F (36.8 °C)     Physical Exam  Vitals and nursing note reviewed.   Constitutional:       Appearance: Normal appearance. She is obese.   HENT:      Head: Normocephalic.      Right Ear: Tympanic membrane normal.      Left Ear: Tympanic membrane normal.      Nose: Nose normal.      Mouth/Throat:      Lips: Pink.      Mouth: Mucous membranes are moist.      Pharynx: Oropharyngeal exudate: clear post nasal drainage..   Eyes:      Conjunctiva/sclera: Conjunctivae normal.    Cardiovascular:      Rate and Rhythm: Normal rate and regular rhythm.      Pulses: Normal pulses.      Heart sounds: Normal heart sounds.   Pulmonary:      Effort: Pulmonary effort is normal.      Breath sounds: Normal breath sounds. No wheezing or rhonchi.   Abdominal:      General: Abdomen is flat. Bowel sounds are normal. There is no distension.      Palpations: Abdomen is soft.      Tenderness: There is no abdominal tenderness.   Musculoskeletal:         General: Normal range of motion.      Cervical back: Normal range of motion.      Lumbar back: Spasms and bony tenderness present.   Skin:     General: Skin is warm and dry.      Capillary Refill: Capillary refill takes less than 2 seconds.   Neurological:      General: No focal deficit present.      Mental Status: She is alert and oriented to person, place, and time. Mental status is at baseline.   Psychiatric:         Mood and Affect: Mood normal.         Behavior: Behavior normal.             Lab Results   Component Value Date    WBC 6.59 03/23/2023    HGB 11.9 (L) 03/23/2023    HCT 35.8 (L) 03/23/2023    MCV 88.2 03/23/2023     03/23/2023        CMP  Sodium   Date Value Ref Range Status   03/23/2023 142 136 - 145 mmol/L Final     Potassium   Date Value Ref Range Status   03/23/2023 4.1 3.5 - 5.1 mmol/L Final     Chloride   Date Value Ref Range Status   03/23/2023 104 98 - 107 mmol/L Final     CO2   Date Value Ref Range Status   03/23/2023 29 21 - 32 mmol/L Final     Glucose   Date Value Ref Range Status   03/23/2023 122 (H) 74 - 106 mg/dL Final     BUN   Date Value Ref Range Status   03/23/2023 15 7 - 18 mg/dL Final     Creatinine   Date Value Ref Range Status   03/23/2023 0.76 0.55 - 1.02 mg/dL Final     Calcium   Date Value Ref Range Status   03/23/2023 9.6 8.5 - 10.1 mg/dL Final     Total Protein   Date Value Ref Range Status   03/23/2023 6.8 6.4 - 8.2 g/dL Final     Albumin   Date Value Ref Range Status   03/23/2023 3.9 3.5 - 5.0 g/dL Final      Bilirubin, Total   Date Value Ref Range Status   03/23/2023 0.2 >0.0 - 1.2 mg/dL Final     Alk Phos   Date Value Ref Range Status   03/23/2023 122 (H) 46 - 118 U/L Final     AST   Date Value Ref Range Status   03/23/2023 30 15 - 37 U/L Final     ALT   Date Value Ref Range Status   03/23/2023 47 13 - 56 U/L Final     Anion Gap   Date Value Ref Range Status   03/23/2023 13 7 - 16 mmol/L Final     eGFR   Date Value Ref Range Status   03/23/2023 91 >=60 mL/min/1.73m² Final     Procedures   Assessment and Plan (including Health Maintenance)   :    Plan:           Problem List Items Addressed This Visit    None  Visit Diagnoses       Low back pain, unspecified back pain laterality, unspecified chronicity, unspecified whether sciatica present    -  Primary    Relevant Orders    POCT URINALYSIS W/O SCOPE (Completed)            Health Maintenance Topics with due status: Not Due       Topic Last Completion Date    Colorectal Cancer Screening 12/14/2015    Hemoglobin A1c (Diabetic Prevention Screening) 12/22/2022    Lipid Panel 03/08/2023    Mammogram 05/04/2023       Future Appointments   Date Time Provider Department Center   9/20/2023  9:00 AM [facility]   9/20/2023 10:00 AM [facility] GASTR [facility]   7/17/2024  2:40 PM [facility] MAMMO1 [facility] MAMMO [facility]        Health Maintenance Due   Topic Date Due    Hepatitis C Screening  Never done    Cervical Cancer Screening  Never done    COVID-19 Vaccine (1) Never done    Pneumococcal Vaccines (Age 0-64) (1 - PCV) Never done    HIV Screening  Never done    TETANUS VACCINE  Never done    Shingles Vaccine (1 of 2) Never done        No follow-ups on file.     Signature:  Zahida Blanchard NP  [address] [place]  [address]    Date of encounter: 5/16/23

## 2023-05-16 NOTE — LETTER
May 16, 2023      [facility]  [address]  [place] [address]  Phone: [phone]  Fax: [phone]       Patient: Mally Woods   YOB: 1965  Date of Visit: 05/16/2023    To Whom It May Concern:    Moises Woods  was at [facility] on 05/16/2023. The patient may return to work/school on 05/17/2023 with no restrictions. If you have any questions or concerns, or if I can be of further assistance, please do not hesitate to contact me.    Sincerely,    Ammy Paul RN

## 2023-05-18 ENCOUNTER — HOSPITAL ENCOUNTER (EMERGENCY)
Facility: HOSPITAL | Age: 58
Discharge: HOME | End: 2023-05-18
Attending: EMERGENCY MEDICINE
Payer: COMMERCIAL

## 2023-05-18 VITALS
HEART RATE: 62 BPM | DIASTOLIC BLOOD PRESSURE: 70 MMHG | RESPIRATION RATE: 16 BRPM | BODY MASS INDEX: 44.73 KG/M2 | OXYGEN SATURATION: 97 % | TEMPERATURE: 98 F | SYSTOLIC BLOOD PRESSURE: 140 MMHG | WEIGHT: 262 LBS | HEIGHT: 64 IN

## 2023-05-18 DIAGNOSIS — R07.9 CHEST PAIN: ICD-10-CM

## 2023-05-18 DIAGNOSIS — K21.9 GASTROESOPHAGEAL REFLUX DISEASE, UNSPECIFIED WHETHER ESOPHAGITIS PRESENT: Primary | ICD-10-CM

## 2023-05-18 LAB
ALBUMIN SERPL BCP-MCNC: 4 G/DL (ref 3.5–5)
ALBUMIN/GLOB SERPL: 1.3 {RATIO}
ALP SERPL-CCNC: 105 U/L (ref 46–118)
ALT SERPL W P-5'-P-CCNC: 32 U/L (ref 13–56)
ANION GAP SERPL CALCULATED.3IONS-SCNC: 12 MMOL/L (ref 7–16)
AST SERPL W P-5'-P-CCNC: 14 U/L (ref 15–37)
BASOPHILS # BLD AUTO: 0.05 K/UL (ref 0–0.2)
BASOPHILS NFR BLD AUTO: 0.7 % (ref 0–1)
BILIRUB SERPL-MCNC: 0.3 MG/DL (ref ?–1.2)
BUN SERPL-MCNC: 8 MG/DL (ref 7–18)
BUN/CREAT SERPL: 12 (ref 6–20)
CALCIUM SERPL-MCNC: 9.4 MG/DL (ref 8.5–10.1)
CHLORIDE SERPL-SCNC: 106 MMOL/L (ref 98–107)
CO2 SERPL-SCNC: 30 MMOL/L (ref 21–32)
CREAT SERPL-MCNC: 0.65 MG/DL (ref 0.55–1.02)
DIFFERENTIAL METHOD BLD: ABNORMAL
EGFR (NO RACE VARIABLE) (RUSH/TITUS): 102 ML/MIN/1.73M2
EOSINOPHIL # BLD AUTO: 0.18 K/UL (ref 0–0.5)
EOSINOPHIL NFR BLD AUTO: 2.3 % (ref 1–4)
ERYTHROCYTE [DISTWIDTH] IN BLOOD BY AUTOMATED COUNT: 12.5 % (ref 11.5–14.5)
GLOBULIN SER-MCNC: 3.1 G/DL (ref 2–4)
GLUCOSE SERPL-MCNC: 93 MG/DL (ref 74–106)
HCT VFR BLD AUTO: 40.1 % (ref 38–47)
HGB BLD-MCNC: 13.3 G/DL (ref 12–16)
IMM GRANULOCYTES # BLD AUTO: 0.02 K/UL (ref 0–0.04)
IMM GRANULOCYTES NFR BLD: 0.3 % (ref 0–0.4)
LYMPHOCYTES # BLD AUTO: 2.06 K/UL (ref 1–4.8)
LYMPHOCYTES NFR BLD AUTO: 26.9 % (ref 27–41)
MCH RBC QN AUTO: 30 PG (ref 27–31)
MCHC RBC AUTO-ENTMCNC: 33.2 G/DL (ref 32–36)
MCV RBC AUTO: 90.3 FL (ref 80–96)
MONOCYTES # BLD AUTO: 0.48 K/UL (ref 0–0.8)
MONOCYTES NFR BLD AUTO: 6.3 % (ref 2–6)
MPC BLD CALC-MCNC: 10.4 FL (ref 9.4–12.4)
NEUTROPHILS # BLD AUTO: 4.88 K/UL (ref 1.8–7.7)
NEUTROPHILS NFR BLD AUTO: 63.5 % (ref 53–65)
NRBC # BLD AUTO: 0 X10E3/UL
NRBC, AUTO (.00): 0 %
NT-PROBNP SERPL-MCNC: 17 PG/ML (ref 1–125)
PLATELET # BLD AUTO: 292 K/UL (ref 150–400)
POTASSIUM SERPL-SCNC: 4 MMOL/L (ref 3.5–5.1)
PROT SERPL-MCNC: 7.1 G/DL (ref 6.4–8.2)
RBC # BLD AUTO: 4.44 M/UL (ref 4.2–5.4)
SODIUM SERPL-SCNC: 144 MMOL/L (ref 136–145)
TROPONIN I SERPL HS-MCNC: 4.4 PG/ML
TROPONIN I SERPL HS-MCNC: <4 PG/ML
WBC # BLD AUTO: 7.67 K/UL (ref 4.5–11)

## 2023-05-18 PROCEDURE — 99284 EMERGENCY DEPT VISIT MOD MDM: CPT | Mod: ,,, | Performed by: EMERGENCY MEDICINE

## 2023-05-18 PROCEDURE — 83880 ASSAY OF NATRIURETIC PEPTIDE: CPT | Performed by: EMERGENCY MEDICINE

## 2023-05-18 PROCEDURE — 80053 COMPREHEN METABOLIC PANEL: CPT | Performed by: EMERGENCY MEDICINE

## 2023-05-18 PROCEDURE — 93010 EKG 12-LEAD: ICD-10-PCS | Mod: ,,, | Performed by: INTERNAL MEDICINE

## 2023-05-18 PROCEDURE — 93005 ELECTROCARDIOGRAM TRACING: CPT

## 2023-05-18 PROCEDURE — 85025 COMPLETE CBC W/AUTO DIFF WBC: CPT | Performed by: EMERGENCY MEDICINE

## 2023-05-18 PROCEDURE — 99284 PR EMERGENCY DEPT VISIT,LEVEL IV: ICD-10-PCS | Mod: ,,, | Performed by: EMERGENCY MEDICINE

## 2023-05-18 PROCEDURE — 84484 ASSAY OF TROPONIN QUANT: CPT | Performed by: EMERGENCY MEDICINE

## 2023-05-18 PROCEDURE — 99285 EMERGENCY DEPT VISIT HI MDM: CPT | Mod: 25

## 2023-05-18 PROCEDURE — 25000003 PHARM REV CODE 250: Performed by: EMERGENCY MEDICINE

## 2023-05-18 PROCEDURE — 93010 ELECTROCARDIOGRAM REPORT: CPT | Mod: ,,, | Performed by: INTERNAL MEDICINE

## 2023-05-18 RX ORDER — MAG HYDROX/ALUMINUM HYD/SIMETH 200-200-20
30 SUSPENSION, ORAL (FINAL DOSE FORM) ORAL ONCE
Status: COMPLETED | OUTPATIENT
Start: 2023-05-18 | End: 2023-05-18

## 2023-05-18 RX ORDER — LIDOCAINE HYDROCHLORIDE 20 MG/ML
10 SOLUTION OROPHARYNGEAL ONCE
Status: COMPLETED | OUTPATIENT
Start: 2023-05-18 | End: 2023-05-18

## 2023-05-18 RX ADMIN — ALUMINUM HYDROXIDE, MAGNESIUM HYDROXIDE, AND DIMETHICONE 30 ML: 200; 20; 200 SUSPENSION ORAL at 11:05

## 2023-05-18 RX ADMIN — LIDOCAINE HYDROCHLORIDE 10 ML: 20 SOLUTION OROPHARYNGEAL at 11:05

## 2023-05-18 NOTE — DISCHARGE INSTRUCTIONS
Return to emergency department for any worsening or further problems.  Try using liquid Mylanta at home for recurrent symptoms.  Follow up in clinic with gastroenterology as well as Cardiology.

## 2023-05-18 NOTE — Clinical Note
"Mally"Martha Woods was seen and treated in our emergency department on 5/18/2023.  She may return to work on 05/19/2023.       If you have any questions or concerns, please don't hesitate to call.      Alka ESQUEDA    "

## 2023-05-18 NOTE — ED PROVIDER NOTES
Encounter Date: 5/18/2023    SCRIBE #1 NOTE: I, Carey Segura, am scribing for, and in the presence of,  Job Matos MD. I have scribed the entire note.     History     Chief Complaint   Patient presents with    Chest Pain     The patient is a 57 y.o. female who presents to the emergency department for chest pain. The patient states that her chest pain lasted all night, and the pain spread to her shoulders, arms, and jaw. She explains that these pains are typical for her acid reflux, but she wished to be examined at the ED to ensure that she was not having a heart attack. She also complains of diarrhea 2-3 times a day, lack of appetite, abdominal pain, nausea, and coughing. She denies shortness of breath, vomiting, diaphoresis, pain or swelling in her legs, and fever. She states that she has not taken any liquid medicine, but just takes her normal acid reflux medication (Protonix at night and Pepcid AC in the morning). The patient had a heart cath 7 years ago that showed no blockage, and she has not had a stress test. She is scheduled to have another heart cath in June 2023. She has a history of thoracic aortic aneurysm, and her CT scan last week was normal. There are no other complaints in the ED at this time.    The history is provided by the patient. No  was used.   Review of patient's allergies indicates:   Allergen Reactions    Penicillins Hives and Rash     Can take rocephin  Other reaction(s): > 10 years ago  Can take rocephin      Celecoxib     Ciprofloxacin      States she can not take them due to aneurism     Fentanyl Other (See Comments)     Extreme sedation  Other reaction(s): Other (See Comments)  Extreme sedation  Extreme sedation      Hydrochlorothiazide Other (See Comments)     Reaction unknown  Other reaction(s): Other (See Comments)  Reaction unknown  Reaction unknown    Promethazine hcl Other (See Comments)     Hallucinations    Betamethasone Other (See Comments) and Rash      Flushing  Flushing  Flushing    Hydromorphone hcl Palpitations    Latex Rash    Morphine Palpitations    Sulfa (sulfonamide antibiotics) Itching     Past Medical History:   Diagnosis Date    Acute superficial gastritis without hemorrhage 10/21/2021    Anxiety     Aortic aneurysm 11/04/2020    w/o rupture    Cervical disc disorder     Chest pain in adult 10/21/2021    Depression     GERD (gastroesophageal reflux disease)     History of transient ischemic attack (TIA) 11/04/2020    Hyperlipidemia     Hypertension     Stroke     Thoracic aortic aneurysm     Vitamin deficiency      Past Surgical History:   Procedure Laterality Date    CARDIAC CATHETERIZATION Left 07/31/2008    Performed by Dr. Bruce Cramer    CARPAL TUNNEL RELEASE  12/21/2017    Dr. Moran with cyst removed from left middle finger    CHOLECYSTECTOMY      COLONOSCOPY      DIAGNOSTIC LAPAROSCOPY      DILATION AND CURETTAGE OF UTERUS      EGD, WITH BALLOON DILATION  2018    Performed by Dr. Marc Lomax    TOTAL REDUCTION MAMMOPLASTY       Family History   Problem Relation Age of Onset    Hyperlipidemia Mother     Hypertension Mother     Glaucoma Mother     Diabetes Mother     Cervical cancer Mother     Pancreatic cancer Mother     Hyperlipidemia Father     Hypertension Father     Dementia Father     No Known Problems Sister      Social History     Tobacco Use    Smoking status: Never     Passive exposure: Past    Smokeless tobacco: Never   Substance Use Topics    Alcohol use: Never    Drug use: Never     Review of Systems   Constitutional:  Positive for appetite change. Negative for diaphoresis and fever.   Respiratory:  Positive for cough. Negative for shortness of breath.    Cardiovascular:  Positive for chest pain. Negative for leg swelling.   Gastrointestinal:  Positive for abdominal pain, diarrhea and nausea. Negative for vomiting.   All other systems reviewed and are negative.    Physical Exam     Initial Vitals [05/18/23 1100]   BP Pulse Resp  Temp SpO2   (!) 144/77 69 14 98.3 °F (36.8 °C) 96 %      MAP       --         Physical Exam    Nursing note and vitals reviewed.  Constitutional: She appears well-developed and well-nourished.   HENT:   Head: Normocephalic and atraumatic.   Mouth/Throat: Oropharynx is clear and moist.   Eyes: Conjunctivae and EOM are normal. Pupils are equal, round, and reactive to light.   Neck:   Normal range of motion.  Cardiovascular:  Normal rate, regular rhythm, normal heart sounds and intact distal pulses.           Pulmonary/Chest: Breath sounds normal.   Abdominal: Abdomen is soft. Bowel sounds are normal.   Musculoskeletal:         General: Normal range of motion.      Cervical back: Normal range of motion.     Neurological: She is alert and oriented to person, place, and time.   Skin: Skin is warm and dry.   Psychiatric: She has a normal mood and affect. Her behavior is normal. Judgment and thought content normal.       ED Course   Procedures  Labs Reviewed   COMPREHENSIVE METABOLIC PANEL - Abnormal; Notable for the following components:       Result Value    AST 14 (*)     All other components within normal limits   CBC WITH DIFFERENTIAL - Abnormal; Notable for the following components:    Lymphocytes % 26.9 (*)     Monocytes % 6.3 (*)     All other components within normal limits   TROPONIN I - Normal   NT-PRO NATRIURETIC PEPTIDE - Normal   TROPONIN I - Normal   CBC W/ AUTO DIFFERENTIAL    Narrative:     The following orders were created for panel order CBC auto differential.  Procedure                               Abnormality         Status                     ---------                               -----------         ------                     CBC with Differential[[id number]]        Abnormal            Final result                 Please view results for these tests on the individual orders.     EKG Readings: (Independently Interpreted)   Rhythm: Sinus Arrhythmia. Heart Rate: 65.   Interpreted by Job Matos MD at  11:05.   ECG Results              EKG 12-lead (Chest Pain) Age >30 (In process)  Result time 05/18/23 11:22:20      In process by Interface, Lab In [facility] (05/18/23 11:22:20)                   Narrative:    Test Reason : R07.9,    Vent. Rate : 065 BPM     Atrial Rate : 000 BPM     P-R Int : 188 ms          QRS Dur : 098 ms      QT Int : 384 ms       P-R-T Axes : 047 066 073 degrees     QTc Int : 393 ms    Sinus arrhythmia  Poor R wave progression  Anterior T wave abnormality is nonspecific  Borderline ECG      Referred By: [email or web address]   SELF           Confirmed By:                                   Imaging Results              X-Ray Chest AP Portable (Final result)  Result time 05/18/23 11:22:05      Final result by Po Valente MD (05/18/23 11:22:05)                   Impression:      No acute finding      Electronically signed by: Po Valente  Date:    05/18/2023  Time:    11:22               Narrative:    EXAMINATION:  XR CHEST AP PORTABLE    CLINICAL HISTORY:  Chest pain;    TECHNIQUE:  Single frontal view of the chest was performed.    COMPARISON:  03/23/2023    FINDINGS:  The cardiac silhouette is enlarged as before.  Lungs are clear.  No pneumothorax.  No pleural effusion.                                       Medications   aluminum-magnesium hydroxide-simethicone 200-200-20 mg/5 mL suspension 30 mL (30 mLs Oral Given 5/18/23 1116)     And   LIDOcaine HCl 2% oral solution 10 mL (10 mLs Oral Given 5/18/23 1116)                Attending Attestation:           Physician Attestation for Scribe:  Physician Attestation Statement for Scribe #1: I, Job Matos MD, reviewed documentation, as scribed by Carey Segura in my presence, and it is both accurate and complete.           ED Course as of 05/18/23 1313   Thu May 18, 2023   1112 Medical decision-making:  Differential diagnosis includes GERD, STEMI, NSTEMI, pneumonia, CHF, atypical chest pain.  All labs imaging ordered and interpreted by me. [BB]   1911 Chest x-ray  by my interpretation shows no acute disease. [BB]   1202 CBC is normal.  Pro BNP is normal  Troponin is normal.  CMP is normal. [BB]   1309 Initial and repeat troponins are both normal with no significant change.  On repeat examination patient states symptoms are completely resolved after GI cocktail.  Patient states that although her symptoms sound cardiac these are the symptoms she has always had in the past with her reflux disease. [BB]      ED Course User Index  [BB] Job Matos MD                   Clinical Impression:   Final diagnoses:  [R07.9] Chest pain  [K21.9] Gastroesophageal reflux disease, unspecified whether esophagitis present (Primary)        ED Disposition Condition    Discharge Stable          ED Prescriptions    None       Follow-up Information    None          Job Matos MD  05/18/23 1851

## 2023-05-19 DIAGNOSIS — K21.9 GASTROESOPHAGEAL REFLUX DISEASE, UNSPECIFIED WHETHER ESOPHAGITIS PRESENT: Primary | ICD-10-CM

## 2023-05-19 RX ORDER — PANTOPRAZOLE SODIUM 40 MG/1
40 TABLET, DELAYED RELEASE ORAL 2 TIMES DAILY
Qty: 60 TABLET | Refills: 2 | Status: SHIPPED | OUTPATIENT
Start: 2023-05-19 | End: 2023-05-26 | Stop reason: SDUPTHER

## 2023-05-26 ENCOUNTER — ANESTHESIA EVENT (OUTPATIENT)
Dept: GASTROENTEROLOGY | Facility: HOSPITAL | Age: 58
End: 2023-05-26
Payer: COMMERCIAL

## 2023-05-26 ENCOUNTER — ANESTHESIA (OUTPATIENT)
Dept: GASTROENTEROLOGY | Facility: HOSPITAL | Age: 58
End: 2023-05-26
Payer: COMMERCIAL

## 2023-05-26 ENCOUNTER — HOSPITAL ENCOUNTER (OUTPATIENT)
Dept: GASTROENTEROLOGY | Facility: HOSPITAL | Age: 58
Discharge: HOME | End: 2023-05-26
Attending: NURSE PRACTITIONER
Payer: COMMERCIAL

## 2023-05-26 VITALS
HEART RATE: 57 BPM | RESPIRATION RATE: 15 BRPM | TEMPERATURE: 99 F | DIASTOLIC BLOOD PRESSURE: 70 MMHG | OXYGEN SATURATION: 97 % | SYSTOLIC BLOOD PRESSURE: 131 MMHG

## 2023-05-26 DIAGNOSIS — R13.19 ESOPHAGEAL DYSPHAGIA: Primary | ICD-10-CM

## 2023-05-26 DIAGNOSIS — K29.00 ACUTE SUPERFICIAL GASTRITIS WITHOUT HEMORRHAGE: ICD-10-CM

## 2023-05-26 DIAGNOSIS — K44.9 HH (HIATUS HERNIA): ICD-10-CM

## 2023-05-26 DIAGNOSIS — K21.9 GASTROESOPHAGEAL REFLUX DISEASE, UNSPECIFIED WHETHER ESOPHAGITIS PRESENT: ICD-10-CM

## 2023-05-26 PROCEDURE — 88342 IMHCHEM/IMCYTCHM 1ST ANTB: CPT | Mod: 26,,, | Performed by: PATHOLOGY

## 2023-05-26 PROCEDURE — D9220A PRA ANESTHESIA: ICD-10-PCS | Mod: ,,, | Performed by: NURSE ANESTHETIST, CERTIFIED REGISTERED

## 2023-05-26 PROCEDURE — 43239 PR EGD, FLEX, W/BIOPSY, SGL/MULTI: ICD-10-PCS | Mod: ,,, | Performed by: INTERNAL MEDICINE

## 2023-05-26 PROCEDURE — 43450 DILATE ESOPHAGUS 1/MULT PASS: CPT | Mod: 51,,, | Performed by: INTERNAL MEDICINE

## 2023-05-26 PROCEDURE — 43239 EGD BIOPSY SINGLE/MULTIPLE: CPT | Mod: ,,, | Performed by: INTERNAL MEDICINE

## 2023-05-26 PROCEDURE — 43239 EGD BIOPSY SINGLE/MULTIPLE: CPT | Performed by: INTERNAL MEDICINE

## 2023-05-26 PROCEDURE — 88342 SURGICAL PATHOLOGY: ICD-10-PCS | Mod: 26,,, | Performed by: PATHOLOGY

## 2023-05-26 PROCEDURE — 25000003 PHARM REV CODE 250: Performed by: NURSE ANESTHETIST, CERTIFIED REGISTERED

## 2023-05-26 PROCEDURE — 27201423 OPTIME MED/SURG SUP & DEVICES STERILE SUPPLY

## 2023-05-26 PROCEDURE — 43450 PR DILATE ESOPHAGUS: ICD-10-PCS | Mod: 51,,, | Performed by: INTERNAL MEDICINE

## 2023-05-26 PROCEDURE — 37000008 HC ANESTHESIA 1ST 15 MINUTES

## 2023-05-26 PROCEDURE — 63600175 PHARM REV CODE 636 W HCPCS: Performed by: NURSE ANESTHETIST, CERTIFIED REGISTERED

## 2023-05-26 PROCEDURE — 88305 TISSUE EXAM BY PATHOLOGIST: CPT | Mod: 26,,, | Performed by: PATHOLOGY

## 2023-05-26 PROCEDURE — 88305 TISSUE EXAM BY PATHOLOGIST: CPT | Mod: TC,SUR | Performed by: INTERNAL MEDICINE

## 2023-05-26 PROCEDURE — 88305 SURGICAL PATHOLOGY: ICD-10-PCS | Mod: 26,,, | Performed by: PATHOLOGY

## 2023-05-26 PROCEDURE — D9220A PRA ANESTHESIA: Mod: ,,, | Performed by: NURSE ANESTHETIST, CERTIFIED REGISTERED

## 2023-05-26 PROCEDURE — 43450 DILATE ESOPHAGUS 1/MULT PASS: CPT | Performed by: INTERNAL MEDICINE

## 2023-05-26 PROCEDURE — 37000009 HC ANESTHESIA EA ADD 15 MINS

## 2023-05-26 RX ORDER — LIDOCAINE HYDROCHLORIDE 20 MG/ML
INJECTION INTRAVENOUS
Status: DISCONTINUED | OUTPATIENT
Start: 2023-05-26 | End: 2023-05-26

## 2023-05-26 RX ORDER — SODIUM CHLORIDE 0.9 % (FLUSH) 0.9 %
10 SYRINGE (ML) INJECTION
Status: CANCELLED | OUTPATIENT
Start: 2023-05-26

## 2023-05-26 RX ORDER — PANTOPRAZOLE SODIUM 40 MG/1
40 TABLET, DELAYED RELEASE ORAL 2 TIMES DAILY
Qty: 60 TABLET | Refills: 6 | Status: SHIPPED | OUTPATIENT
Start: 2023-05-26 | End: 2023-06-12 | Stop reason: ALTCHOICE

## 2023-05-26 RX ORDER — PROPOFOL 10 MG/ML
VIAL (ML) INTRAVENOUS
Status: DISCONTINUED | OUTPATIENT
Start: 2023-05-26 | End: 2023-05-26

## 2023-05-26 RX ADMIN — LIDOCAINE HYDROCHLORIDE 100 MG: 20 INJECTION, SOLUTION INTRAVENOUS at 08:05

## 2023-05-26 RX ADMIN — PROPOFOL 100 MG: 10 INJECTION, EMULSION INTRAVENOUS at 08:05

## 2023-05-26 NOTE — TRANSFER OF CARE
Anesthesia Transfer of Care Note    Patient: Mally Woods    Procedure(s) Performed: * No procedures listed *    Patient location: GI    Anesthesia Type: general    Transport from OR: Transported from OR on room air with adequate spontaneous ventilation    Post pain: adequate analgesia    Post assessment: no apparent anesthetic complications    Post vital signs: stable    Level of consciousness: responds to stimulation    Nausea/Vomiting: no nausea/vomiting    Complications: none    Transfer of care protocol was followed      Last vitals:   Visit Vitals  BP (!) 161/76 (BP Location: Right arm, Patient Position: Lying)   Pulse (!) 37   Temp 37 °C (98.6 °F) (Oral)   Resp 18   LMP  (LMP Unknown)   SpO2 97%   Breastfeeding No

## 2023-05-26 NOTE — ANESTHESIA RELEASE NOTE
Anesthesia Release from PACU Note    Patient: Mally Woods    Procedure(s) Performed: * No procedures listed *    Anesthesia type: general    Post pain: Adequate analgesia    Post assessment: no apparent anesthetic complications    Last Vitals:   Visit Vitals  BP (!) 161/76 (BP Location: Right arm, Patient Position: Lying)   Pulse (!) 37   Temp 37 °C (98.6 °F) (Oral)   Resp 18   LMP  (LMP Unknown)   SpO2 97%   Breastfeeding No       Post vital signs: stable    Level of consciousness: awake    Nausea/Vomiting: no nausea/no vomiting    Complications: none    Airway Patency: patent    Respiratory: unassisted    Cardiovascular: stable and blood pressure at baseline    Hydration: euvolemic

## 2023-05-26 NOTE — H&P
[facility] Endoscopy  Gastroenterology  H&P    Patient Name: Mally Woods  MRN: [id number]  Admission Date: 5/26/2023  Code Status: Prior    Attending Provider: MARY Fuller   Primary Care Physician: Zahida Blanchard NP  Principal Problem:<principal problem not specified>    Subjective:     History of Present Illness: Pt has c/o gerd and dysphagia; for egd with dilation.    Past Medical History:   Diagnosis Date    Acute superficial gastritis without hemorrhage 10/21/2021    Anxiety     Aortic aneurysm 11/04/2020    w/o rupture    Cervical disc disorder     Chest pain in adult 10/21/2021    Depression     GERD (gastroesophageal reflux disease)     History of transient ischemic attack (TIA) 11/04/2020    Hyperlipidemia     Hypertension     Stroke     Thoracic aortic aneurysm     Vitamin deficiency        Past Surgical History:   Procedure Laterality Date    CARDIAC CATHETERIZATION Left 07/31/2008    Performed by Dr. Bruce Cramer    CARPAL TUNNEL RELEASE  12/21/2017    Dr. Moran with cyst removed from left middle finger    CHOLECYSTECTOMY      COLONOSCOPY      DIAGNOSTIC LAPAROSCOPY      DILATION AND CURETTAGE OF UTERUS      EGD, WITH BALLOON DILATION  2018    Performed by Dr. Marc Lomax    TOTAL REDUCTION MAMMOPLASTY         Review of patient's allergies indicates:   Allergen Reactions    Penicillins Hives and Rash     Can take rocephin  Other reaction(s): > 10 years ago  Can take rocephin      Celecoxib     Ciprofloxacin      States she can not take them due to aneurism     Fentanyl Other (See Comments)     Extreme sedation  Other reaction(s): Other (See Comments)  Extreme sedation  Extreme sedation      Hydrochlorothiazide Other (See Comments)     Reaction unknown  Other reaction(s): Other (See Comments)  Reaction unknown  Reaction unknown    Promethazine hcl Other (See Comments)     Hallucinations    Betamethasone Other (See Comments) and Rash     Flushing  Flushing  Flushing    Hydromorphone  hcl Palpitations    Latex Rash    Morphine Palpitations    Sulfa (sulfonamide antibiotics) Itching     Family History       Problem Relation (Age of Onset)    Cervical cancer Mother    Dementia Father    Diabetes Mother    Glaucoma Mother    Hyperlipidemia Mother, Father    Hypertension Mother, Father    No Known Problems Sister    Pancreatic cancer Mother          Tobacco Use    Smoking status: Never     Passive exposure: Past    Smokeless tobacco: Never   Substance and Sexual Activity    Alcohol use: Never    Drug use: Never    Sexual activity: Yes     Partners: Male     Review of Systems   HENT:  Positive for trouble swallowing.    Respiratory: Negative.     Cardiovascular: Negative.    Gastrointestinal: Negative.    Objective:     Vital Signs (Most Recent):  Pulse: 60 (05/26/23 0803)  Resp: 14 (05/26/23 0803)  BP: 139/67 (05/26/23 0803)  SpO2: 99 % (05/26/23 0803) Vital Signs (24h Range):  Pulse:  [60] 60  Resp:  [14] 14  SpO2:  [99 %] 99 %  BP: (139)/(67) 139/67        There is no height or weight on file to calculate BMI.    No intake or output data in the 24 hours ending 05/26/23 0855    Lines/Drains/Airways       Peripheral Intravenous Line  Duration                  Peripheral IV - Single Lumen 05/26/23 0802 22 G Left Antecubital <1 day                    Physical Exam  Vitals reviewed.   Constitutional:       General: She is not in acute distress.     Appearance: Normal appearance. She is well-developed. She is not ill-appearing.   HENT:      Head: Normocephalic and atraumatic.      Nose: Nose normal.   Eyes:      Pupils: Pupils are equal, round, and reactive to light.   Cardiovascular:      Rate and Rhythm: Normal rate and regular rhythm.   Pulmonary:      Effort: Pulmonary effort is normal.      Breath sounds: Normal breath sounds. No wheezing.   Abdominal:      General: Abdomen is flat. Bowel sounds are normal. There is no distension.      Palpations: Abdomen is soft.      Tenderness: There is no  abdominal tenderness. There is no guarding.   Skin:     General: Skin is warm and dry.      Coloration: Skin is not jaundiced.   Neurological:      Mental Status: She is alert.   Psychiatric:         Attention and Perception: Attention normal.         Mood and Affect: Affect normal.         Speech: Speech normal.         Behavior: Behavior is cooperative.      Comments: Pt was calm while speaking.       Significant Labs:  CBC: No results for input(s): WBC, HGB, HCT, PLT in the last 48 hours.  CMP: No results for input(s): GLU, CALCIUM, ALBUMIN, PROT, NA, K, CO2, CL, BUN, CREATININE, ALKPHOS, ALT, AST, BILITOT in the last 48 hours.    Significant Imaging:  Imaging results within the past 24 hours have been reviewed.    Assessment/Plan:     There are no hospital problems to display for this patient.        Imp: gerd, dysphagia  Plan: egd with dilation    Florentin Lomax MD  Gastroenterology  [facility] - Endoscopy

## 2023-05-26 NOTE — ANESTHESIA PREPROCEDURE EVALUATION
05/26/2023  Mally Woods is a 57 y.o., female.      Pre-op Assessment    I have reviewed the Patient Summary Reports.     I have reviewed the Nursing Notes. I have reviewed the NPO Status.   I have reviewed the Medications.     Review of Systems  Cardiovascular:   Hypertension    Hepatic/GI:   GERD Liver Disease,    Neurological:   CVA Headaches    Psych:   Psychiatric History anxiety          Physical Exam  General: Well nourished, Cooperative, Alert and Oriented    Airway:  Mallampati: II   Mouth Opening: Normal  TM Distance: Normal  Tongue: Normal  Neck ROM: Normal ROM        Anesthesia Plan  Type of Anesthesia, risks & benefits discussed:    Anesthesia Type: Gen Natural Airway  Intra-op Monitoring Plan: Standard ASA Monitors  Post Op Pain Control Plan: multimodal analgesia  Induction:  IV  Airway Plan: , Awake  Informed Consent: Informed consent signed with the Patient and all parties understand the risks and agree with anesthesia plan.  All questions answered. Patient consented to blood products? Yes  ASA Score: 3  Day of Surgery Review of History & Physical: H&P Update referred to the surgeon/provider.    Ready For Surgery From Anesthesia Perspective.     .

## 2023-05-26 NOTE — DISCHARGE INSTRUCTIONS
Procedure Date  5/26/23     Impression  Overall Impression: Mucosa of the esophagus was grossly normal. The distal esophagus was biopsied and the esophagus was dilated with a 48F Negrete dilator.  A 2cm hiatus hernia was noted. Gastritis was present and biopsies were obtained. Mucosa of the duodenum was normal.     Recommendation    Await pathology results      Continue daily PPI; avoid nsaids; repeat esophageal dilation prn.  THE NURSE WILL CALL YOU WITH YOUR BIOPSY RESULTS IN A FEW DAYS. NO DRIVING, OPERATING EQUIPMENT, OR SIGNING LEGAL DOCUMENTS FOR 24 HOURS.

## 2023-05-26 NOTE — ANESTHESIA POSTPROCEDURE EVALUATION
Anesthesia Post Evaluation    Patient: Mally Woods    Procedure(s) Performed: * No procedures listed *    Final Anesthesia Type: general      Patient location during evaluation: GI PACU  Patient participation: Yes- Able to Participate  Level of consciousness: responds to stimulation  Post-procedure vital signs: reviewed and stable  Pain management: adequate  Airway patency: patent  MEMO mitigation strategies: Multimodal analgesia  PONV status at discharge: No PONV  Anesthetic complications: no      Cardiovascular status: blood pressure returned to baseline  Respiratory status: unassisted and spontaneous ventilation  Hydration status: euvolemic  Follow-up not needed.          Vitals Value Taken Time     No case tracking events are documented in the log.      Pain/Hever Score: No data recorded

## 2023-05-30 LAB
ESTROGEN SERPL-MCNC: NORMAL PG/ML
INSULIN SERPL-ACNC: NORMAL U[IU]/ML
LAB AP GROSS DESCRIPTION: NORMAL
LAB AP LABORATORY NOTES: NORMAL
T3RU NFR SERPL: NORMAL %

## 2023-06-02 ENCOUNTER — TELEPHONE (OUTPATIENT)
Dept: GASTROENTEROLOGY | Facility: CLINIC | Age: 58
End: 2023-06-02
Payer: COMMERCIAL

## 2023-06-06 ENCOUNTER — OFFICE VISIT (OUTPATIENT)
Dept: FAMILY MEDICINE | Facility: CLINIC | Age: 58
End: 2023-06-06
Payer: COMMERCIAL

## 2023-06-06 ENCOUNTER — HOSPITAL ENCOUNTER (EMERGENCY)
Facility: HOSPITAL | Age: 58
Discharge: HOME | End: 2023-06-06
Payer: COMMERCIAL

## 2023-06-06 VITALS
OXYGEN SATURATION: 96 % | TEMPERATURE: 99 F | HEART RATE: 72 BPM | DIASTOLIC BLOOD PRESSURE: 64 MMHG | SYSTOLIC BLOOD PRESSURE: 100 MMHG | HEIGHT: 64 IN | RESPIRATION RATE: 18 BRPM | WEIGHT: 260.81 LBS | BODY MASS INDEX: 44.53 KG/M2

## 2023-06-06 VITALS
DIASTOLIC BLOOD PRESSURE: 69 MMHG | HEIGHT: 65 IN | SYSTOLIC BLOOD PRESSURE: 139 MMHG | RESPIRATION RATE: 17 BRPM | BODY MASS INDEX: 43.65 KG/M2 | OXYGEN SATURATION: 98 % | HEART RATE: 72 BPM | TEMPERATURE: 99 F | WEIGHT: 262 LBS

## 2023-06-06 DIAGNOSIS — R10.9 ABDOMINAL PAIN: ICD-10-CM

## 2023-06-06 DIAGNOSIS — R19.7 DIARRHEA, UNSPECIFIED TYPE: Primary | ICD-10-CM

## 2023-06-06 DIAGNOSIS — R10.9 ABDOMINAL PAIN, UNSPECIFIED ABDOMINAL LOCATION: Primary | ICD-10-CM

## 2023-06-06 LAB
ALBUMIN SERPL BCP-MCNC: 3.8 G/DL (ref 3.5–5)
ALBUMIN/GLOB SERPL: 1.1 {RATIO}
ALP SERPL-CCNC: 107 U/L (ref 46–118)
ALT SERPL W P-5'-P-CCNC: 48 U/L (ref 13–56)
ANION GAP SERPL CALCULATED.3IONS-SCNC: 11 MMOL/L (ref 7–16)
AST SERPL W P-5'-P-CCNC: 24 U/L (ref 15–37)
BACTERIA #/AREA URNS HPF: ABNORMAL /HPF
BASOPHILS # BLD AUTO: 0.04 K/UL (ref 0–0.2)
BASOPHILS NFR BLD AUTO: 0.5 % (ref 0–1)
BILIRUB SERPL-MCNC: 0.4 MG/DL (ref ?–1.2)
BILIRUB UR QL STRIP: NEGATIVE
BUN SERPL-MCNC: 10 MG/DL (ref 7–18)
BUN/CREAT SERPL: 13 (ref 6–20)
CALCIUM SERPL-MCNC: 9.1 MG/DL (ref 8.5–10.1)
CHLORIDE SERPL-SCNC: 104 MMOL/L (ref 98–107)
CLARITY UR: ABNORMAL
CO2 SERPL-SCNC: 29 MMOL/L (ref 21–32)
COLOR UR: YELLOW
CREAT SERPL-MCNC: 0.76 MG/DL (ref 0.55–1.02)
DIFFERENTIAL METHOD BLD: ABNORMAL
EGFR (NO RACE VARIABLE) (RUSH/TITUS): 91 ML/MIN/1.73M2
EOSINOPHIL # BLD AUTO: 0.13 K/UL (ref 0–0.5)
EOSINOPHIL NFR BLD AUTO: 1.5 % (ref 1–4)
ERYTHROCYTE [DISTWIDTH] IN BLOOD BY AUTOMATED COUNT: 12.6 % (ref 11.5–14.5)
GLOBULIN SER-MCNC: 3.6 G/DL (ref 2–4)
GLUCOSE SERPL-MCNC: 94 MG/DL (ref 74–106)
GLUCOSE UR STRIP-MCNC: NORMAL MG/DL
HCT VFR BLD AUTO: 40.2 % (ref 38–47)
HGB BLD-MCNC: 13.1 G/DL (ref 12–16)
HYALINE CASTS #/AREA URNS LPF: ABNORMAL /LPF
IMM GRANULOCYTES # BLD AUTO: 0.03 K/UL (ref 0–0.04)
IMM GRANULOCYTES NFR BLD: 0.3 % (ref 0–0.4)
KETONES UR STRIP-SCNC: NEGATIVE MG/DL
LEUKOCYTE ESTERASE UR QL STRIP: NEGATIVE
LIPASE SERPL-CCNC: 67 U/L (ref 73–393)
LYMPHOCYTES # BLD AUTO: 1.73 K/UL (ref 1–4.8)
LYMPHOCYTES NFR BLD AUTO: 20.2 % (ref 27–41)
MCH RBC QN AUTO: 30.5 PG (ref 27–31)
MCHC RBC AUTO-ENTMCNC: 32.6 G/DL (ref 32–36)
MCV RBC AUTO: 93.5 FL (ref 80–96)
MONOCYTES # BLD AUTO: 0.61 K/UL (ref 0–0.8)
MONOCYTES NFR BLD AUTO: 7.1 % (ref 2–6)
MPC BLD CALC-MCNC: 10.7 FL (ref 9.4–12.4)
MUCOUS THREADS #/AREA URNS HPF: ABNORMAL /HPF
NEUTROPHILS # BLD AUTO: 6.04 K/UL (ref 1.8–7.7)
NEUTROPHILS NFR BLD AUTO: 70.4 % (ref 53–65)
NITRITE UR QL STRIP: NEGATIVE
NRBC # BLD AUTO: 0 X10E3/UL
NRBC, AUTO (.00): 0 %
PH UR STRIP: 5 PH UNITS
PLATELET # BLD AUTO: 270 K/UL (ref 150–400)
POTASSIUM SERPL-SCNC: 3.9 MMOL/L (ref 3.5–5.1)
PROT SERPL-MCNC: 7.4 G/DL (ref 6.4–8.2)
PROT UR QL STRIP: 30
RBC # BLD AUTO: 4.3 M/UL (ref 4.2–5.4)
RBC # UR STRIP: NEGATIVE /UL
SODIUM SERPL-SCNC: 140 MMOL/L (ref 136–145)
SP GR UR STRIP: 1.03
SQUAMOUS #/AREA URNS LPF: ABNORMAL /LPF
UROBILINOGEN UR STRIP-ACNC: NORMAL MG/DL
WBC # BLD AUTO: 8.58 K/UL (ref 4.5–11)
WBC #/AREA URNS HPF: ABNORMAL /HPF
YEAST #/AREA URNS HPF: ABNORMAL /HPF

## 2023-06-06 PROCEDURE — 3008F BODY MASS INDEX DOCD: CPT | Mod: CPTII,,, | Performed by: NURSE PRACTITIONER

## 2023-06-06 PROCEDURE — 1160F PR REVIEW ALL MEDS BY PRESCRIBER/CLIN PHARMACIST DOCUMENTED: ICD-10-PCS | Mod: CPTII,,, | Performed by: NURSE PRACTITIONER

## 2023-06-06 PROCEDURE — 3078F PR MOST RECENT DIASTOLIC BLOOD PRESSURE < 80 MM HG: ICD-10-PCS | Mod: CPTII,,, | Performed by: NURSE PRACTITIONER

## 2023-06-06 PROCEDURE — 25500020 PHARM REV CODE 255: Performed by: NURSE PRACTITIONER

## 2023-06-06 PROCEDURE — 81001 URINALYSIS AUTO W/SCOPE: CPT | Performed by: NURSE PRACTITIONER

## 2023-06-06 PROCEDURE — 99212 OFFICE O/P EST SF 10 MIN: CPT | Mod: ,,, | Performed by: NURSE PRACTITIONER

## 2023-06-06 PROCEDURE — 99212 PR OFFICE/OUTPT VISIT, EST, LEVL II, 10-19 MIN: ICD-10-PCS | Mod: ,,, | Performed by: NURSE PRACTITIONER

## 2023-06-06 PROCEDURE — 99284 PR EMERGENCY DEPT VISIT,LEVEL IV: ICD-10-PCS | Mod: ,,, | Performed by: NURSE PRACTITIONER

## 2023-06-06 PROCEDURE — 83690 ASSAY OF LIPASE: CPT | Performed by: NURSE PRACTITIONER

## 2023-06-06 PROCEDURE — 3074F PR MOST RECENT SYSTOLIC BLOOD PRESSURE < 130 MM HG: ICD-10-PCS | Mod: CPTII,,, | Performed by: NURSE PRACTITIONER

## 2023-06-06 PROCEDURE — 99285 EMERGENCY DEPT VISIT HI MDM: CPT | Mod: 25

## 2023-06-06 PROCEDURE — 3078F DIAST BP <80 MM HG: CPT | Mod: CPTII,,, | Performed by: NURSE PRACTITIONER

## 2023-06-06 PROCEDURE — 80053 COMPREHEN METABOLIC PANEL: CPT | Performed by: NURSE PRACTITIONER

## 2023-06-06 PROCEDURE — 1159F PR MEDICATION LIST DOCUMENTED IN MEDICAL RECORD: ICD-10-PCS | Mod: CPTII,,, | Performed by: NURSE PRACTITIONER

## 2023-06-06 PROCEDURE — 1159F MED LIST DOCD IN RCRD: CPT | Mod: CPTII,,, | Performed by: NURSE PRACTITIONER

## 2023-06-06 PROCEDURE — 4010F ACE/ARB THERAPY RXD/TAKEN: CPT | Mod: CPTII,,, | Performed by: NURSE PRACTITIONER

## 2023-06-06 PROCEDURE — 87086 URINE CULTURE/COLONY COUNT: CPT | Performed by: NURSE PRACTITIONER

## 2023-06-06 PROCEDURE — 99284 EMERGENCY DEPT VISIT MOD MDM: CPT | Mod: ,,, | Performed by: NURSE PRACTITIONER

## 2023-06-06 PROCEDURE — 1160F RVW MEDS BY RX/DR IN RCRD: CPT | Mod: CPTII,,, | Performed by: NURSE PRACTITIONER

## 2023-06-06 PROCEDURE — 96360 HYDRATION IV INFUSION INIT: CPT | Mod: 59

## 2023-06-06 PROCEDURE — 25000003 PHARM REV CODE 250: Performed by: NURSE PRACTITIONER

## 2023-06-06 PROCEDURE — 3074F SYST BP LT 130 MM HG: CPT | Mod: CPTII,,, | Performed by: NURSE PRACTITIONER

## 2023-06-06 PROCEDURE — 85025 COMPLETE CBC W/AUTO DIFF WBC: CPT | Performed by: NURSE PRACTITIONER

## 2023-06-06 PROCEDURE — 4010F PR ACE/ARB THEARPY RXD/TAKEN: ICD-10-PCS | Mod: CPTII,,, | Performed by: NURSE PRACTITIONER

## 2023-06-06 PROCEDURE — 3008F PR BODY MASS INDEX (BMI) DOCUMENTED: ICD-10-PCS | Mod: CPTII,,, | Performed by: NURSE PRACTITIONER

## 2023-06-06 RX ORDER — ONDANSETRON 4 MG/1
4 TABLET, ORALLY DISINTEGRATING ORAL EVERY 8 HOURS PRN
Qty: 20 TABLET | Refills: 0 | Status: SHIPPED | OUTPATIENT
Start: 2023-06-06 | End: 2024-03-05 | Stop reason: SDUPTHER

## 2023-06-06 RX ADMIN — SODIUM CHLORIDE 1000 ML: 9 INJECTION, SOLUTION INTRAVENOUS at 12:06

## 2023-06-06 RX ADMIN — IOPAMIDOL 100 ML: 755 INJECTION, SOLUTION INTRAVENOUS at 01:06

## 2023-06-06 NOTE — PROGRESS NOTES
MARY Ray   [place] [address]  [place]  [address]      PATIENT NAME: Mally Woods  : 1965  DATE: 23  MRN: [id number]      Billing Provider: MARY Ray  Level of Service:   Patient PCP Information       Provider PCP Type    Zahida Blanchard NP General            Reason for Visit / Chief Complaint: Sore Throat (Had esophagus stretched a couple of weeks ago and she knew her throat would be better by now but it is getting worse ), Diarrhea (Has had for several days Blood pressure was low for her this morning before she took her BP meds 96/60), Back Pain, and Abdominal Pain (Still having all the problems she had before she had the esophagus stretched )       Update PCP  Update Chief Complaint         History of Present Illness / Problem Focused Workflow     Mally Woods presents to the clinic with Sore Throat (Had esophagus stretched a couple of weeks ago and she knew her throat would be better by now but it is getting worse ), Diarrhea (Has had for several days Blood pressure was low for her this morning before she took her BP meds 96/60), Back Pain, and Abdominal Pain (Still having all the problems she had before she had the esophagus stretched )     Patient presents to clinic with c/o abdominal pain and diarrhea x 2 weeks. Reports increase in reflux symptoms, sore throat that lead to stomach pain then progressed to abdominal pain. She reports watery bile stools. Reports dizziness and fatigue. Hypotension this am prior to medication.       Review of Systems     @Review of Systems   Constitutional:  Negative for fatigue and fever.   HENT:  Negative for nasal congestion, ear pain, postnasal drip, rhinorrhea and sinus pressure/congestion.    Eyes:  Negative for discharge and itching.   Respiratory:  Negative for chest tightness, shortness of breath and wheezing.    Cardiovascular:  Negative for chest pain and palpitations.   Gastrointestinal:  Positive for  abdominal pain, diarrhea and reflux. Negative for blood in stool, change in bowel habit and change in bowel habit.   Genitourinary:  Negative for dysuria.   Musculoskeletal:  Negative for joint swelling.   Neurological:  Negative for dizziness and headaches.     Medical / Social / Family History     Past Medical History:   Diagnosis Date    Acute superficial gastritis without hemorrhage 10/21/2021    Anxiety     Aortic aneurysm 11/04/2020    w/o rupture    Cervical disc disorder     Chest pain in adult 10/21/2021    Depression     GERD (gastroesophageal reflux disease)     History of transient ischemic attack (TIA) 11/04/2020    Hyperlipidemia     Hypertension     Stroke     Thoracic aortic aneurysm     Vitamin deficiency        Past Surgical History:   Procedure Laterality Date    CARDIAC CATHETERIZATION Left 07/31/2008    Performed by Dr. Bruce Crmaer    CARPAL TUNNEL RELEASE  12/21/2017    Dr. Moran with cyst removed from left middle finger    CHOLECYSTECTOMY      COLONOSCOPY      DIAGNOSTIC LAPAROSCOPY      DILATION AND CURETTAGE OF UTERUS      EGD, WITH BALLOON DILATION  2018    Performed by Dr. Marc Lomax    TOTAL REDUCTION MAMMOPLASTY         Social History  Ms.  reports that she has never smoked. She has been exposed to tobacco smoke. She has never used smokeless tobacco. She reports that she does not drink alcohol and does not use drugs.    Family History  Ms.'s family history includes Cervical cancer in her mother; Dementia in her father; Diabetes in her mother; Glaucoma in her mother; Hyperlipidemia in her father and mother; Hypertension in her father and mother; No Known Problems in her sister; Pancreatic cancer in her mother.    Medications and Allergies     Medications  Outpatient Medications Marked as Taking for the 6/6/23 encounter (Office Visit) with MARY Ray   Medication Sig Dispense Refill    ergocalciferol (ERGOCALCIFEROL) 50,000 unit Cap TAKE 1 CAPSULE BY MOUTH TWICE WEEKLY AS  DIRECTED 14 capsule 0    EScitalopram oxalate (LEXAPRO) 5 MG Tab 2 tablets.      lisinopriL (PRINIVIL,ZESTRIL) 2.5 MG tablet Take 1 tablet (2.5 mg total) by mouth 2 (two) times daily. 90 tablet 1    lisinopriL (PRINIVIL,ZESTRIL) 5 MG tablet TAKE ONE TABLET BY MOUTH ONCE DAILY FOR high blood PRESSURE 90 tablet 1    mecobalamin, vitamin B12, 1,000 mcg TbDL DISSOLVE 1 TABLET on the tongue ONCE DAILY 30 tablet 0    pantoprazole (PROTONIX) 40 MG tablet Take 1 tablet (40 mg total) by mouth 2 (two) times daily. 60 tablet 6       Allergies  Review of patient's allergies indicates:   Allergen Reactions    Penicillins Hives and Rash     Can take rocephin  Other reaction(s): > 10 years ago  Can take rocephin      Celecoxib     Ciprofloxacin      States she can not take them due to aneurism     Fentanyl Other (See Comments)     Extreme sedation  Other reaction(s): Other (See Comments)  Extreme sedation  Extreme sedation      Hydrochlorothiazide Other (See Comments)     Reaction unknown  Other reaction(s): Other (See Comments)  Reaction unknown  Reaction unknown    Promethazine hcl Other (See Comments)     Hallucinations    Betamethasone Other (See Comments) and Rash     Flushing  Flushing  Flushing    Hydromorphone hcl Palpitations    Latex Rash    Morphine Palpitations    Sulfa (sulfonamide antibiotics) Itching       Physical Examination     Vitals:    06/06/23 1004   BP: 100/64   Pulse: 72   Resp: 18   Temp: 98.5 °F (36.9 °C)     Physical Exam  Constitutional:       General: She is not in acute distress.     Appearance: Normal appearance.   Cardiovascular:      Rate and Rhythm: Normal rate and regular rhythm.   Pulmonary:      Effort: Pulmonary effort is normal. No respiratory distress.      Breath sounds: Normal breath sounds. No wheezing, rhonchi or rales.   Abdominal:      Comments: Generalized abdominal discomfort   Skin:     General: Skin is warm and dry.   Neurological:      Mental Status: She is alert.             Lab  Results   Component Value Date    WBC 7.67 05/18/2023    HGB 13.3 05/18/2023    HCT 40.1 05/18/2023    MCV 90.3 05/18/2023     05/18/2023          Sodium   Date Value Ref Range Status   05/18/2023 144 136 - 145 mmol/L Final     Potassium   Date Value Ref Range Status   05/18/2023 4.0 3.5 - 5.1 mmol/L Final     Chloride   Date Value Ref Range Status   05/18/2023 106 98 - 107 mmol/L Final     CO2   Date Value Ref Range Status   05/18/2023 30 21 - 32 mmol/L Final     Glucose   Date Value Ref Range Status   05/18/2023 93 74 - 106 mg/dL Final     BUN   Date Value Ref Range Status   05/18/2023 8 7 - 18 mg/dL Final     Creatinine   Date Value Ref Range Status   05/18/2023 0.65 0.55 - 1.02 mg/dL Final     Calcium   Date Value Ref Range Status   05/18/2023 9.4 8.5 - 10.1 mg/dL Final     Total Protein   Date Value Ref Range Status   05/18/2023 7.1 6.4 - 8.2 g/dL Final     Albumin   Date Value Ref Range Status   05/18/2023 4.0 3.5 - 5.0 g/dL Final     Bilirubin, Total   Date Value Ref Range Status   05/18/2023 0.3 >0.0 - 1.2 mg/dL Final     Alk Phos   Date Value Ref Range Status   05/18/2023 105 46 - 118 U/L Final     AST   Date Value Ref Range Status   05/18/2023 14 (L) 15 - 37 U/L Final     ALT   Date Value Ref Range Status   05/18/2023 32 13 - 56 U/L Final     Anion Gap   Date Value Ref Range Status   05/18/2023 12 7 - 16 mmol/L Final     eGFR    Date Value Ref Range Status   03/23/2021 85  Final     eGFR   Date Value Ref Range Status   05/23/2022 81 >=60 mL/min/1.73m² Final      EGD  Narrative: Table formatting from the original result was not included.  Procedure Date  5/26/23    Impression  Overall   Impression:  Mucosa of the esophagus was grossly normal. The distal   esophagus was biopsied and the esophagus was dilated with a 48F Negrete   dilator.  A 2cm hiatus hernia was noted. Gastritis was present and   biopsies were obtained. Mucosa of the duodenum was normal.    Recommendation   Await  pathology results     Continue daily PPI; avoid nsaids; repeat esophageal dilation prn.    Indication  Gastroesophageal reflux disease, unspecified whether esophagitis present    Providers  Nai Rosenberg Technician   BARBRA Mchugh CRNA, DHEERAJ Registered Nurse   Florentin Lomax MD Proceduralist     Medications  Moderate sedation administered by anesthesia staff - See anesthesia   record.    Preprocedure  A history and physical has been performed, and patient medication   allergies have been reviewed. The patient's tolerance of previous   anesthesia has been reviewed. The risks and benefits of the procedure and   the sedation options and risks were discussed with the patient. All   questions were answered and informed consent obtained.    ASA Score: ASA 2 - Patient with mild systemic disease with no functional   limitations  Mallampati Airway Score: II (hard and soft palate, upper portion of   tonsils anduvula visible)    Details of the Procedure  The patient underwent monitored anesthesia care, which was administered by   an anesthesia professional. The patient's blood pressure, heart rate,   level of consciousness, oxygen, respirations, ECG and ETCO2 were monitored   throughout the procedure. The scope was introduced through the mouth and   advanced to the third part of the duodenum. Retroflexion was performed in   the cardia. Prior to the procedure, the patient's H. Pylori status was   unknown. The patient's estimated blood loss was minimal (<5 mL). The   procedure was not difficult. The patient tolerated the procedure well.   There were no apparent adverse events.     Scope: Gastroscope  Scope Serial: [id number]    Events  Procedure Events   Event Event Time     Procedure Events   Event Event Time   SCOPE IN 5/26/2023  8:58 AM   SCOPE OUT 5/26/2023  9:02 AM     Findings  Erythematous mucosa in the fundus of the stomach and antrum; performed   cold forceps biopsy  Dilated in the  esophagus with Negrete dilator  2 cm hiatal hernia     Procedures   Assessment and Plan (including Health Maintenance)      Problem List  Smart Sets  Document Outside HM   :    Plan:           Problem List Items Addressed This Visit          GI    Abdominal pain - Primary    Current Assessment & Plan     Discussed with GI and recommended patient seek medical attention at [facility] in [place].             Health Maintenance Topics with due status: Not Due       Topic Last Completion Date    Colorectal Cancer Screening 12/14/2015    Hemoglobin A1c (Diabetic Prevention Screening) 12/22/2022    Cervical Cancer Screening 02/23/2023    Lipid Panel 03/08/2023    Mammogram 05/04/2023       Future Appointments   Date Time Provider Department Center   9/20/2023  9:00 AM [facility]   9/20/2023 10:00 AM MARY Fuller [facility]   [facility]        Health Maintenance Due   Topic Date Due    Hepatitis C Screening  Never done    COVID-19 Vaccine (1) Never done    Pneumococcal Vaccines (Age 0-64) (1 - PCV) Never done    HIV Screening  Never done    TETANUS VACCINE  Never done    Shingles Vaccine (1 of 2) Never done        No follow-ups on file.     Signature:  MARY Ray  [facility]  [address]  [place]  [address]    Date of encounter: 6/6/23

## 2023-06-06 NOTE — Clinical Note
"Mally"Martha Woods was seen and treated in our emergency department on 6/6/2023.  She may return to work on 06/07/2023.       If you have any questions or concerns, please don't hesitate to call.      MICHAEL VENTURA RN RN    "

## 2023-06-06 NOTE — ED TRIAGE NOTES
Pt was sent to the ED for evaluation for dehydration. She was at the clinic and had a low BP.  She states she was at the clinic for a sore throat, diarrhea. She has a known diagnosis of gastritis

## 2023-06-06 NOTE — ED PROVIDER NOTES
"Encounter Date: 6/6/2023       History     Chief Complaint   Patient presents with    Sore Throat    Abdominal Pain    Diarrhea     57 year old female presents to ED with complaint of sore throat, abdominal pain, and diarrhea. Patient states symptoms are chronic and variable, but have worsened over the course of 4 days. She states she went to PCP's office on this morning for symptoms and reported to PCP her blood pressure was low. BP on this morning at home was 96/67. She did not take BP meds. At clinic, BP was 100/64. Patient endorses burning to abdomen and soreness with continued diarrhea that is "acid". Reports everything she eats/drinks goes through her. She had EGD 2 weeks ago with acute/chronic gastritis noted. Patient denies vomiting, chills, fever, cough, shortness of breath. Endorses soreness across chest due to costochondritis.     The history is provided by the patient and medical records.   Review of patient's allergies indicates:   Allergen Reactions    Penicillins Hives and Rash     Can take rocephin  Other reaction(s): > 10 years ago  Can take rocephin      Celecoxib     Ciprofloxacin      States she can not take them due to aneurism     Fentanyl Other (See Comments)     Extreme sedation  Other reaction(s): Other (See Comments)  Extreme sedation  Extreme sedation      Hydrochlorothiazide Other (See Comments)     Reaction unknown  Other reaction(s): Other (See Comments)  Reaction unknown  Reaction unknown    Promethazine hcl Other (See Comments)     Hallucinations    Betamethasone Other (See Comments) and Rash     Flushing  Flushing  Flushing    Hydromorphone hcl Palpitations    Latex Rash    Morphine Palpitations    Sulfa (sulfonamide antibiotics) Itching     Past Medical History:   Diagnosis Date    Acute superficial gastritis without hemorrhage 10/21/2021    Anxiety     Aortic aneurysm 11/04/2020    w/o rupture    Cervical disc disorder     Chest pain in adult 10/21/2021    Depression     GERD " (gastroesophageal reflux disease)     History of transient ischemic attack (TIA) 11/04/2020    Hyperlipidemia     Hypertension     Stroke     Thoracic aortic aneurysm     Vitamin deficiency      Past Surgical History:   Procedure Laterality Date    CARDIAC CATHETERIZATION Left 07/31/2008    Performed by Dr. Bruce Cramer    CARPAL TUNNEL RELEASE  12/21/2017    Dr. Moran with cyst removed from left middle finger    CHOLECYSTECTOMY      COLONOSCOPY      DIAGNOSTIC LAPAROSCOPY      DILATION AND CURETTAGE OF UTERUS      EGD, WITH BALLOON DILATION  2018    Performed by Dr. Marc Lomax    TOTAL REDUCTION MAMMOPLASTY       Family History   Problem Relation Age of Onset    Hyperlipidemia Mother     Hypertension Mother     Glaucoma Mother     Diabetes Mother     Cervical cancer Mother     Pancreatic cancer Mother     Hyperlipidemia Father     Hypertension Father     Dementia Father     No Known Problems Sister      Social History     Tobacco Use    Smoking status: Never     Passive exposure: Past    Smokeless tobacco: Never   Substance Use Topics    Alcohol use: Never    Drug use: Never     Review of Systems   Constitutional:  Negative for chills and fever.   HENT:  Positive for sore throat. Negative for sinus pressure and sinus pain.    Eyes:  Negative for photophobia and visual disturbance.   Respiratory:  Negative for cough and shortness of breath.    Cardiovascular:  Negative for chest pain and palpitations.   Gastrointestinal:  Positive for abdominal pain and diarrhea. Negative for nausea and vomiting.   Endocrine: Negative for cold intolerance and heat intolerance.   Genitourinary:  Negative for dysuria and urgency.   Musculoskeletal:  Negative for arthralgias, gait problem and joint swelling.   Skin:  Negative for color change and wound.   Allergic/Immunologic: Negative for environmental allergies and immunocompromised state.   Neurological:  Negative for dizziness and weakness.   Hematological:  Negative for  adenopathy. Does not bruise/bleed easily.   Psychiatric/Behavioral:  Negative for agitation and confusion.      Physical Exam     Initial Vitals [06/06/23 1208]   BP Pulse Resp Temp SpO2   139/69 72 17 98.6 °F (37 °C) 98 %      MAP       --         Physical Exam    Nursing note and vitals reviewed.  Constitutional: She appears well-developed and well-nourished.   HENT:   Head: Normocephalic and atraumatic.   Eyes: EOM are normal. Pupils are equal, round, and reactive to light.   Neck: Neck supple.   Normal range of motion.  Cardiovascular:  Normal rate and regular rhythm.           Pulmonary/Chest: She has no wheezes. She has no rhonchi.   Abdominal: Abdomen is soft. She exhibits no distension. There is abdominal tenderness in the right upper quadrant and epigastric area.   Musculoskeletal:         General: No tenderness or edema.      Cervical back: Normal range of motion and neck supple.     Lymphadenopathy:     She has no cervical adenopathy.   Neurological: She is alert and oriented to person, place, and time. No cranial nerve deficit or sensory deficit.   Skin: Skin is warm and dry. Capillary refill takes less than 2 seconds.   Psychiatric: She has a normal mood and affect. Thought content normal.       Medical Screening Exam   See Full Note    ED Course   Procedures  Labs Reviewed   LIPASE - Abnormal; Notable for the following components:       Result Value    Lipase 67 (*)     All other components within normal limits   URINALYSIS, REFLEX TO URINE CULTURE - Abnormal; Notable for the following components:    Protein, UA 30 (*)     All other components within normal limits   CBC WITH DIFFERENTIAL - Abnormal; Notable for the following components:    Neutrophils % 70.4 (*)     Lymphocytes % 20.2 (*)     Monocytes % 7.1 (*)     All other components within normal limits   URINALYSIS, MICROSCOPIC - Abnormal; Notable for the following components:    Bacteria, UA Moderate (*)     Yeast, UA Few (*)     Squamous  Epithelial Cells, UA Many (*)     Mucus, UA Many (*)     Hyaline Casts, UA 0-2 (*)     All other components within normal limits   CULTURE, URINE   CBC W/ AUTO DIFFERENTIAL    Narrative:     The following orders were created for panel order CBC W/ AUTO DIFFERENTIAL.  Procedure                               Abnormality         Status                     ---------                               -----------         ------                     CBC with Differential[[id number]]        Abnormal            Final result                 Please view results for these tests on the individual orders.   COMPREHENSIVE METABOLIC PANEL   EXTRA TUBES    Narrative:     The following orders were created for panel order EXTRA TUBES.  Procedure                               Abnormality         Status                     ---------                               -----------         ------                     Light Blue Top Hold[[id number]]                              In process                 Red Top Hold[[id number]]                                     In process                   Please view results for these tests on the individual orders.   LIGHT BLUE TOP HOLD   RED TOP HOLD          Imaging Results              CT Abdomen Pelvis With Contrast (Final result)  Result time 06/06/23 14:14:06      Final result by Braeden Arce DO (06/06/23 14:14:06)                   Impression:      1. No evidence to suggest acute pathology within the abdomen or pelvis.    Severe diffuse hepatic steatosis.  There is focal fatty sparing anterior left/right hepatic lobe.      Electronically signed by: Braeden Arce  Date:    06/06/2023  Time:    14:14               Narrative:    EXAMINATION:  CT ABDOMEN PELVIS WITH CONTRAST    CLINICAL HISTORY:  Abdominal pain, acute, nonlocalized;    COMPARISON:  2021    TECHNIQUE:  CT ABDOMEN PELVIS WITH [id number] cc of Isovue 370    FINDINGS:  Lower lobes: Clear.    Cardiac: No  effusion.    Abdomen:    Hepatobiliary/gallbladder: Severe diffuse hepatic steatosis.  There is focal fatty sparing anterior left/right hepatic lobe.    Spleen: Normal    Pancreas: Normal    Adrenal/Genitourinary system: Multiple simple cyst right kidney.    Bowel and Mesentery: There is no evidence for bowel obstruction.    Peritoneum: Normal.    Retroperitoneum: No enlarged lymph nodes.    Vasculature: Vascular disease    Reproductive: Normal.    Lymph nodes: No enlarged lymph nodes.    Abdominal wall: Normal.    Osseous structures: Normal.                                       X-Ray Abdomen Flat And Erect (Final result)  Result time 06/06/23 13:00:44      Final result by Braeden Arce DO (06/06/23 13:00:44)                   Impression:      Mild gaseous distended small bowel loops.      Electronically signed by: Braeden Arce  Date:    06/06/2023  Time:    13:00               Narrative:    EXAMINATION:  XR ABDOMEN FLAT AND ERECT    CLINICAL HISTORY:  Unspecified abdominal pain    TECHNIQUE:  XR ABDOMEN FLAT AND ERECT    COMPARISON:  3/24/22    FINDINGS:  Lower lobes are clear    Mild gaseous distended small bowel loops scattered throughout the abdomen.  No free air.  Mild colonic stool.    No acute bone findings.                                       Medications   sodium chloride 0.9% bolus 1,000 mL 1,000 mL (0 mLs Intravenous Stopped 6/6/23 1340)   iopamidoL (ISOVUE-370) injection 100 mL (100 mLs Intravenous Given 6/6/23 1347)     Medical Decision Making:   History:   Old Records Summarized: records from clinic visits.       <> Summary of Records: EGD approximately 2 weeks ago; gastritis on report  Initial Assessment:   Sore throat  Abdominal pain  diarrhea  Differential Diagnosis:   SBO  Gastroenteritis  GERD  Clinical Tests:   Lab Tests: Ordered and Reviewed  Radiological Study: Reviewed and Ordered  ED Management:  MDM    Patient presents for emergent evaluation of acute sore throat, abdominal pain,  diarrhea that poses a threat to life and/or bodily function.    In the ED patient found to have acute abdominal pain, diarrhea.    I ordered labs and personally reviewed them.  Labs significant for moderate bacteria, lipase 67; remaining labs unremarkable  I ordered X-rays and personally reviewed them and reviewed the radiologist interpretation.  Xray significant for Mild gaseous distended small bowel loops.   I ordered CT scan and personally reviewed it and reviewed the radiologist interpretation.  CT significant for  No evidence to suggest acute pathology within the abdomen or pelvis.  Severe diffuse hepatic steatosis..      Discharge MDM  Patient was managed in the ED with IV NS.    The response to treatment was good.    Patient was discharged in stable condition.  Detailed return precautions discussed.  Patient to follow up with GI for further evaluation/follow up of EGD                       Clinical Impression:   Final diagnoses:  [R10.9] Abdominal pain  [R19.7] Diarrhea, unspecified type (Primary)        ED Disposition Condition    Discharge Stable          ED Prescriptions       Medication Sig Dispense Start Date End Date Auth. Provider    ondansetron (ZOFRAN-ODT) 4 MG TbDL Take 1 tablet (4 mg total) by mouth every 8 (eight) hours as needed (nausea). 20 tablet 6/6/2023 -- MARY Roland          Follow-up Information    None          MARY Roland  06/07/23 1215

## 2023-06-08 ENCOUNTER — TELEPHONE (OUTPATIENT)
Dept: EMERGENCY MEDICINE | Facility: HOSPITAL | Age: 58
End: 2023-06-08
Payer: COMMERCIAL

## 2023-06-08 LAB — UA COMPLETE W REFLEX CULTURE PNL UR: ABNORMAL

## 2023-06-08 RX ORDER — FLUCONAZOLE 200 MG/1
200 TABLET ORAL DAILY
Qty: 14 TABLET | Refills: 0 | Status: SHIPPED | OUTPATIENT
Start: 2023-06-08 | End: 2023-06-22

## 2023-06-08 NOTE — TELEPHONE ENCOUNTER
----- Message from MARY Rodas sent at 6/8/2023  8:28 AM CDT -----  Please notify the patient that I sent in a RX to her pharmacy

## 2023-06-09 ENCOUNTER — TELEPHONE (OUTPATIENT)
Dept: GASTROENTEROLOGY | Facility: CLINIC | Age: 58
End: 2023-06-09
Payer: COMMERCIAL

## 2023-06-09 ENCOUNTER — PATIENT MESSAGE (OUTPATIENT)
Dept: GASTROENTEROLOGY | Facility: CLINIC | Age: 58
End: 2023-06-09
Payer: COMMERCIAL

## 2023-06-09 DIAGNOSIS — K21.9 GASTROESOPHAGEAL REFLUX DISEASE, UNSPECIFIED WHETHER ESOPHAGITIS PRESENT: Primary | ICD-10-CM

## 2023-06-09 DIAGNOSIS — M54.50 ACUTE MIDLINE LOW BACK PAIN WITHOUT SCIATICA: ICD-10-CM

## 2023-06-09 NOTE — TELEPHONE ENCOUNTER
Returned call to patient. States she has been having diarrhea and a weird pain in her neck since her EGD. Stool tests are negative. Informed patient that Dr. Lomax reviewed her CT and x-ray from the ER and they were normal. Patient agreeable to upper gi series with barium. Will schedule and call with appointment. Also informed patient to report to the ER if diarrhea persists and she has s/s of dehydration. Verbalized understanding.            ----- Message from MARY Fuller sent at 6/9/2023 11:17 AM CDT -----  May schedule barium upper GI. Dr. Lomax discussed CT and chest xray from ER. No abnormality. May add for appointment on Monday. Turn in stool studies if not done  ----- Message -----  From: Alicia Arndt RN  Sent: 6/9/2023   8:35 AM CDT  To: MARY Fuller    Please advise          ----- Message -----  From: Yumiko Lara  Sent: 6/9/2023   8:25 AM CDT  To: Alicia Arndt RN    Pt left a long detailed message about still having issues , I know we are waiting on stool specimens but she claims theres something wrong with her throat since EGD.wanted to be seen....

## 2023-06-12 ENCOUNTER — OFFICE VISIT (OUTPATIENT)
Dept: GASTROENTEROLOGY | Facility: CLINIC | Age: 58
End: 2023-06-12
Payer: COMMERCIAL

## 2023-06-12 VITALS
SYSTOLIC BLOOD PRESSURE: 119 MMHG | WEIGHT: 258.19 LBS | BODY MASS INDEX: 43.02 KG/M2 | DIASTOLIC BLOOD PRESSURE: 50 MMHG | HEIGHT: 65 IN

## 2023-06-12 DIAGNOSIS — K76.0 FATTY LIVER: ICD-10-CM

## 2023-06-12 DIAGNOSIS — K21.9 GASTROESOPHAGEAL REFLUX DISEASE, UNSPECIFIED WHETHER ESOPHAGITIS PRESENT: ICD-10-CM

## 2023-06-12 DIAGNOSIS — R13.10 DYSPHAGIA, UNSPECIFIED TYPE: ICD-10-CM

## 2023-06-12 DIAGNOSIS — R19.7 DIARRHEA, UNSPECIFIED TYPE: Primary | ICD-10-CM

## 2023-06-12 PROCEDURE — 1160F PR REVIEW ALL MEDS BY PRESCRIBER/CLIN PHARMACIST DOCUMENTED: ICD-10-PCS | Mod: CPTII,,, | Performed by: NURSE PRACTITIONER

## 2023-06-12 PROCEDURE — 4010F ACE/ARB THERAPY RXD/TAKEN: CPT | Mod: CPTII,,, | Performed by: NURSE PRACTITIONER

## 2023-06-12 PROCEDURE — 99214 PR OFFICE/OUTPT VISIT, EST, LEVL IV, 30-39 MIN: ICD-10-PCS | Mod: S$PBB,,, | Performed by: NURSE PRACTITIONER

## 2023-06-12 PROCEDURE — 99214 OFFICE O/P EST MOD 30 MIN: CPT | Mod: PBBFAC | Performed by: NURSE PRACTITIONER

## 2023-06-12 PROCEDURE — 1160F RVW MEDS BY RX/DR IN RCRD: CPT | Mod: CPTII,,, | Performed by: NURSE PRACTITIONER

## 2023-06-12 PROCEDURE — 4010F PR ACE/ARB THEARPY RXD/TAKEN: ICD-10-PCS | Mod: CPTII,,, | Performed by: NURSE PRACTITIONER

## 2023-06-12 PROCEDURE — 3074F PR MOST RECENT SYSTOLIC BLOOD PRESSURE < 130 MM HG: ICD-10-PCS | Mod: CPTII,,, | Performed by: NURSE PRACTITIONER

## 2023-06-12 PROCEDURE — 3008F PR BODY MASS INDEX (BMI) DOCUMENTED: ICD-10-PCS | Mod: CPTII,,, | Performed by: NURSE PRACTITIONER

## 2023-06-12 PROCEDURE — 1159F MED LIST DOCD IN RCRD: CPT | Mod: CPTII,,, | Performed by: NURSE PRACTITIONER

## 2023-06-12 PROCEDURE — 3008F BODY MASS INDEX DOCD: CPT | Mod: CPTII,,, | Performed by: NURSE PRACTITIONER

## 2023-06-12 PROCEDURE — 3078F DIAST BP <80 MM HG: CPT | Mod: CPTII,,, | Performed by: NURSE PRACTITIONER

## 2023-06-12 PROCEDURE — 3078F PR MOST RECENT DIASTOLIC BLOOD PRESSURE < 80 MM HG: ICD-10-PCS | Mod: CPTII,,, | Performed by: NURSE PRACTITIONER

## 2023-06-12 PROCEDURE — 99214 OFFICE O/P EST MOD 30 MIN: CPT | Mod: S$PBB,,, | Performed by: NURSE PRACTITIONER

## 2023-06-12 PROCEDURE — 3074F SYST BP LT 130 MM HG: CPT | Mod: CPTII,,, | Performed by: NURSE PRACTITIONER

## 2023-06-12 PROCEDURE — 1159F PR MEDICATION LIST DOCUMENTED IN MEDICAL RECORD: ICD-10-PCS | Mod: CPTII,,, | Performed by: NURSE PRACTITIONER

## 2023-06-12 RX ORDER — BACLOFEN 10 MG/1
TABLET ORAL
Qty: 90 TABLET | Refills: 1 | Status: SHIPPED | OUTPATIENT
Start: 2023-06-12

## 2023-06-12 NOTE — PROGRESS NOTES
Mally Woods is a 57 y.o. female here for No chief complaint on file.        PCP: Zahida Blanchard  Referring Provider: No referring provider defined for this encounter.     HPI:  Patient presents with report of diarrhea.  Stool studies from 06/07 reviewed, stool is negative for enteric pathogens and C diff. no hematochezia or melena.  She was seen in the ER on 06/06, record reviewed, no anemia on CBC.  Liver enzymes are normal.  CT abdomen and pelvis was performed that does read severe fatty liver.  Liver elastography was originally scheduled for September, we will move up this appointment.  No other acute findings on CT.  She had an EGD dilation on 05/26/2023, esophagus was normal, gastritis noted.  2 cm hiatal hernia.  She did have an esophageal dilation.  She called the office on 06/09/2023 with reports of diarrhea and sore throat.  Dr. Lomax reviewed the chest x-ray, stool studies, and CT abdomen and pelvis that was performed on 06/06 in the ER.  No acute pathology.  Upper GI series or barium swallow was ordered and is scheduled for tomorrow.  Today she reports that she was seen in the ER on yesterday at [facility] for report of diarrhea.  Record reviewed.  Reports that patient stated she was having diarrhea and also vomiting.  No mention of vomiting today.  States that she was given Lomotil at the ER visit.  We did discuss holding Protonix and substituting Pepcid due to possible side effect of diarrhea.  We will also order stool for calprotectin, pancreatic elastase, and blood.  She is reporting that her heart rate was 48 in the ER at [facility] and that her blood pressure was low.  Advised that she should contact her primary care or cardiologist regarding bradycardia and hypotension if needed.        ROS:  Review of Systems   Constitutional:  Negative for appetite change, fatigue, fever and unexpected weight change.   HENT:  Negative for trouble swallowing.    Respiratory:  Negative for  shortness of breath.    Cardiovascular:  Negative for chest pain.   Gastrointestinal:  Positive for diarrhea and reflux. Negative for abdominal pain, blood in stool, change in bowel habit, constipation, nausea, vomiting and change in bowel habit.   Musculoskeletal:  Negative for gait problem.   Integumentary:  Negative for pallor.   Psychiatric/Behavioral:  The patient is not nervous/anxious.         PMHX:  has a past medical history of Acute superficial gastritis without hemorrhage (10/21/2021), Anxiety, Aortic aneurysm (11/04/2020), Cervical disc disorder, Chest pain in adult (10/21/2021), Depression, GERD (gastroesophageal reflux disease), History of transient ischemic attack (TIA) (11/04/2020), Hyperlipidemia, Hypertension, Stroke, Thoracic aortic aneurysm, and Vitamin deficiency.    PSHX:  has a past surgical history that includes Cholecystectomy; Carpal tunnel release (12/21/2017); Total Reduction Mammoplasty; Cardiac catheterization (Left, 07/31/2008); Dilation and curettage of uterus; Colonoscopy; egd, with balloon dilation (2018); and Diagnostic laparoscopy.    PFHX: family history includes Cervical cancer in her mother; Dementia in her father; Diabetes in her mother; Glaucoma in her mother; Hyperlipidemia in her father and mother; Hypertension in her father and mother; No Known Problems in her sister; Pancreatic cancer in her mother.    PSlHX:  reports that she has never smoked. She has been exposed to tobacco smoke. She has never used smokeless tobacco. She reports that she does not drink alcohol and does not use drugs.        Review of patient's allergies indicates:   Allergen Reactions    Penicillins Hives and Rash     Can take rocephin  Other reaction(s): > 10 years ago  Can take rocephin      Celecoxib     Ciprofloxacin      States she can not take them due to aneurism     Fentanyl Other (See Comments)     Extreme sedation  Other reaction(s): Other (See Comments)  Extreme sedation  Extreme sedation    "   Hydrochlorothiazide Other (See Comments)     Reaction unknown  Other reaction(s): Other (See Comments)  Reaction unknown  Reaction unknown    Promethazine hcl Other (See Comments)     Hallucinations    Betamethasone Other (See Comments) and Rash     Flushing  Flushing  Flushing    Hydromorphone hcl Palpitations    Latex Rash    Morphine Palpitations    Sulfa (sulfonamide antibiotics) Itching       Medication List with Changes/Refills   Current Medications    BACLOFEN (LIORESAL) 10 MG TABLET    TAKE ONE TABLET BY MOUTH THREE TIMES DAILY    CYANOCOBALAMIN (VITAMIN B-12) 1000 MCG TABLET    1 tablet.    ERGOCALCIFEROL (ERGOCALCIFEROL) 50,000 UNIT CAP    TAKE 1 CAPSULE BY MOUTH TWICE WEEKLY AS DIRECTED    ESCITALOPRAM OXALATE (LEXAPRO) 5 MG TAB    2 tablets.    FLUCONAZOLE (DIFLUCAN) 200 MG TAB    Take 1 tablet (200 mg total) by mouth once daily. for 14 days    LISINOPRIL (PRINIVIL,ZESTRIL) 2.5 MG TABLET    Take 1 tablet (2.5 mg total) by mouth 2 (two) times daily.    LISINOPRIL (PRINIVIL,ZESTRIL) 5 MG TABLET    TAKE ONE TABLET BY MOUTH ONCE DAILY FOR high blood PRESSURE    MECOBALAMIN, VITAMIN B12, 1,000 MCG TBDL    DISSOLVE 1 TABLET on the tongue ONCE DAILY    ONDANSETRON (ZOFRAN-ODT) 4 MG TBDL    Take 1 tablet (4 mg total) by mouth every 8 (eight) hours as needed (nausea).    PYRILAMINE-CHLOPHEDIANOL (NINJACOF) 12.5-12.5 MG/5 ML LIQD    Take 10 mLs by mouth every 8 (eight) hours as needed (cough).    ROSUVASTATIN (CRESTOR) 5 MG TABLET    1 tablet.    VIT C-ASCORBATE CA-ASCORB SOD (VITAMIN C) 500 MG/15 ML LIQD    as directed   Discontinued Medications    PANTOPRAZOLE (PROTONIX) 40 MG TABLET    Take 1 tablet (40 mg total) by mouth 2 (two) times daily.        Objective Findings:  Vital Signs:  BP (!) 119/50   Ht 5' 5" (1.651 m)   Wt 117.1 kg (258 lb 3.2 oz)   LMP  (LMP Unknown)   BMI 42.97 kg/m²  Body mass index is 42.97 kg/m².    Physical Exam:  Physical Exam  Vitals and nursing note reviewed.   Constitutional:  "      General: She is not in acute distress.     Appearance: Normal appearance.   HENT:      Mouth/Throat:      Mouth: Mucous membranes are moist.   Cardiovascular:      Rate and Rhythm: Normal rate.   Pulmonary:      Effort: Pulmonary effort is normal.      Breath sounds: No wheezing, rhonchi or rales.   Abdominal:      General: Bowel sounds are normal. There is no distension.      Palpations: Abdomen is soft. There is no mass.      Tenderness: There is no abdominal tenderness. There is no guarding.   Skin:     General: Skin is warm and dry.      Coloration: Skin is not jaundiced or pale.   Neurological:      Mental Status: She is alert and oriented to person, place, and time.   Psychiatric:         Mood and Affect: Mood normal.        Labs:  Lab Results   Component Value Date    WBC 8.58 06/06/2023    HGB 13.1 06/06/2023    HCT 40.2 06/06/2023    MCV 93.5 06/06/2023    RDW 12.6 06/06/2023     06/06/2023    LYMPH 20.2 (L) 06/06/2023    LYMPH 1.73 06/06/2023    MONO 7.1 (H) 06/06/2023    EOS 0.13 06/06/2023    BASO 0.04 06/06/2023     Lab Results   Component Value Date     06/06/2023    K 3.9 06/06/2023     06/06/2023    CO2 29 06/06/2023    GLU 94 06/06/2023    BUN 10 06/06/2023    CREATININE 0.76 06/06/2023    CALCIUM 9.1 06/06/2023    PROT 7.4 06/06/2023    ALBUMIN 3.8 06/06/2023    BILITOT 0.4 06/06/2023    ALKPHOS 107 06/06/2023    AST 24 06/06/2023    ALT 48 06/06/2023         Imaging: X-Ray Abdomen Flat And Erect    Result Date: 6/6/2023  EXAMINATION: XR ABDOMEN FLAT AND ERECT CLINICAL HISTORY: Unspecified abdominal pain TECHNIQUE: XR ABDOMEN FLAT AND ERECT COMPARISON: 3/24/22 FINDINGS: Lower lobes are clear Mild gaseous distended small bowel loops scattered throughout the abdomen.  No free air.  Mild colonic stool. No acute bone findings.     Mild gaseous distended small bowel loops. Electronically signed by: Braeden Arce Date:    06/06/2023 Time:    13:00    CT Abdomen Pelvis With  Contrast    Result Date: 6/6/2023  EXAMINATION: CT ABDOMEN PELVIS WITH CONTRAST CLINICAL HISTORY: Abdominal pain, acute, nonlocalized; COMPARISON: 2021 TECHNIQUE: CT ABDOMEN PELVIS WITH [id number] cc of Isovue 370 FINDINGS: Lower lobes: Clear. Cardiac: No effusion. Abdomen: Hepatobiliary/gallbladder: Severe diffuse hepatic steatosis.  There is focal fatty sparing anterior left/right hepatic lobe. Spleen: Normal Pancreas: Normal Adrenal/Genitourinary system: Multiple simple cyst right kidney. Bowel and Mesentery: There is no evidence for bowel obstruction. Peritoneum: Normal. Retroperitoneum: No enlarged lymph nodes. Vasculature: Vascular disease Reproductive: Normal. Lymph nodes: No enlarged lymph nodes. Abdominal wall: Normal. Osseous structures: Normal.     1. No evidence to suggest acute pathology within the abdomen or pelvis. Severe diffuse hepatic steatosis.  There is focal fatty sparing anterior left/right hepatic lobe. Electronically signed by: Braeden Arce Date:    06/06/2023 Time:    14:14    X-Ray Chest AP Portable    Result Date: 5/18/2023  EXAMINATION: XR CHEST AP PORTABLE CLINICAL HISTORY: Chest pain; TECHNIQUE: Single frontal view of the chest was performed. COMPARISON: 03/23/2023 FINDINGS: The cardiac silhouette is enlarged as before.  Lungs are clear.  No pneumothorax.  No pleural effusion.     No acute finding Electronically signed by: Po Valente Date:    05/18/2023 Time:    11:22    EGD    Result Date: 5/26/2023  Table formatting from the original result was not included. Procedure Date 5/26/23 Impression Overall      Mucosa of the esophagus was grossly normal. The distal esophagus was biopsied and the esophagus was dilated with a 48F Negrete dilator.  A 2cm hiatus hernia was noted. Gastritis was present and biopsies were obtained. Mucosa of the duodenum was normal. Recommendation  Await pathology results Continue daily PPI; avoid nsaids; repeat esophageal dilation prn. Indication  Gastroesophageal reflux disease, unspecified whether esophagitis present Providers Nai Rosenberg Technician BARBRA Mchugh CRNA, RN Registered Nurse Florentin Lomax MD Proceduralist Medications Moderate sedation administered by anesthesia staff - See anesthesia record. Preprocedure A history and physical has been performed, and patient medication allergies have been reviewed. The patient's tolerance of previous anesthesia has been reviewed. The risks and benefits of the procedure and the sedation options and risks were discussed with the patient. All questions were answered and informed consent obtained. ASA Score: ASA 2 - Patient with mild systemic disease with no functional limitations Mallampati Airway Score: II (hard and soft palate, upper portion of tonsils anduvula visible) Details of the Procedure The patient underwent monitored anesthesia care, which was administered by an anesthesia professional. The patient's blood pressure, heart rate, level of consciousness, oxygen, respirations, ECG and ETCO2 were monitored throughout the procedure. The scope was introduced through the mouth and advanced to the third part of the duodenum. Retroflexion was performed in the cardia. Prior to the procedure, the patient's H. Pylori status was unknown. The patient's estimated blood loss was minimal (<5 mL). The procedure was not difficult. The patient tolerated the procedure well. There were no apparent adverse events. Scope: Gastroscope Scope Serial: [id number] Events Procedure Events Event Event Time Procedure Events Event Event Time SCOPE IN 5/26/2023  8:58 AM SCOPE OUT 5/26/2023  9:02 AM Findings Erythematous mucosa in the fundus of the stomach and antrum; performed cold forceps biopsy Dilated in the esophagus with Negrete dilator 2 cm hiatal hernia     XR Abdomen Acute Series with PA Chest    Result Date: 6/11/2023  Exam:  XR ABDOMEN ACUTE SERIES W PA CHEST Date of Exam:  6/11/2023 12:26  PM Comparison: no relevant prior studies available for comparison Reason for Study:  Abdominal pain  diarrhea Findings: . AP examination of chest is unremarkable. Abdominal gas pattern normal. Evidence of prior cholecystectomy. No soft tissue masses, abnormal calcifications or acute osseous abnormality. Impression: No acute abnormality        Assessment:  Mally Woods is a 57 y.o. female here with:  1. Diarrhea, unspecified type    2. Gastroesophageal reflux disease, unspecified whether esophagitis present          Recommendations:  1. Pancreatic elastase, Calprotectin, Stool for blood  2. Consider colonoscopy due to diarrhea pending stool study  3. Hold Protonix. Start Pepcid 20 mg twice daily  4. Avoid spicy, greasy foods  Avoid caffeine, citric acid, chocolate, peppermint, and carbonated drinks  Do not lay down within 3 hours of eating  Increase fluid to 64 ounces daily  Avoid antiinflammatory medications such as motrin, advil, aleve, ibuprofen, and BC powder  Keep appointment for Upper GI series  5. Exercise 150 minutes per week  Weight loss of 7-10%. Weight loss should be gradual  Diet low in saturated fats and carbohydrates  Good glucose and cholesterol control  6. Move up appointment for a liver elastography      Follow up in about 3 months (around 9/12/2023).      Order summary:  Orders Placed This Encounter    Miscellaneous Test, Sendout pancreatic elastase    Calprotectin, Stool    Occult blood x 1, stool       Thank you for allowing me to participate in the care of Mally Woods.      JOSE Melgar

## 2023-06-13 ENCOUNTER — HOSPITAL ENCOUNTER (OUTPATIENT)
Dept: RADIOLOGY | Facility: HOSPITAL | Age: 58
Discharge: HOME | End: 2023-06-13
Attending: NURSE PRACTITIONER
Payer: COMMERCIAL

## 2023-06-13 DIAGNOSIS — K21.9 GASTROESOPHAGEAL REFLUX DISEASE, UNSPECIFIED WHETHER ESOPHAGITIS PRESENT: ICD-10-CM

## 2023-06-13 LAB — OCCULT BLOOD: NEGATIVE

## 2023-06-13 PROCEDURE — A9698 NON-RAD CONTRAST MATERIALNOC: HCPCS | Performed by: NURSE PRACTITIONER

## 2023-06-13 PROCEDURE — 25500020 PHARM REV CODE 255: Performed by: NURSE PRACTITIONER

## 2023-06-13 PROCEDURE — 74240 FL UPPER GI WITHOUT KUB: ICD-10-PCS | Mod: 26,,, | Performed by: STUDENT IN AN ORGANIZED HEALTH CARE EDUCATION/TRAINING PROGRAM

## 2023-06-13 PROCEDURE — 82272 OCCULT BLD FECES 1-3 TESTS: CPT | Mod: QW,,, | Performed by: CLINICAL MEDICAL LABORATORY

## 2023-06-13 PROCEDURE — 74240 X-RAY XM UPR GI TRC 1CNTRST: CPT | Mod: 26,,, | Performed by: STUDENT IN AN ORGANIZED HEALTH CARE EDUCATION/TRAINING PROGRAM

## 2023-06-13 PROCEDURE — 82272 OCCULT BLOOD X 1, STOOL: ICD-10-PCS | Mod: QW,,, | Performed by: CLINICAL MEDICAL LABORATORY

## 2023-06-13 PROCEDURE — 74240 X-RAY XM UPR GI TRC 1CNTRST: CPT | Mod: TC

## 2023-06-13 RX ADMIN — BARIUM SULFATE: 980 POWDER, FOR SUSPENSION ORAL at 08:06

## 2023-06-13 RX ADMIN — Medication 176 G: at 08:06

## 2023-06-26 PROBLEM — J01.40 ACUTE NON-RECURRENT PANSINUSITIS: Status: RESOLVED | Noted: 2023-03-22 | Resolved: 2023-06-26

## 2023-08-14 ENCOUNTER — OFFICE VISIT (OUTPATIENT)
Dept: FAMILY MEDICINE | Facility: CLINIC | Age: 58
End: 2023-08-14
Payer: COMMERCIAL

## 2023-08-14 VITALS
BODY MASS INDEX: 42.85 KG/M2 | RESPIRATION RATE: 17 BRPM | DIASTOLIC BLOOD PRESSURE: 73 MMHG | SYSTOLIC BLOOD PRESSURE: 122 MMHG | TEMPERATURE: 99 F | HEIGHT: 65 IN | WEIGHT: 257.19 LBS | OXYGEN SATURATION: 97 % | HEART RATE: 90 BPM

## 2023-08-14 DIAGNOSIS — I10 PRIMARY HYPERTENSION: ICD-10-CM

## 2023-08-14 DIAGNOSIS — U07.1 COVID-19: Primary | ICD-10-CM

## 2023-08-14 LAB
CTP QC/QA: YES
SARS-COV-2 AG RESP QL IA.RAPID: POSITIVE

## 2023-08-14 PROCEDURE — 3008F BODY MASS INDEX DOCD: CPT | Mod: CPTII,,, | Performed by: NURSE PRACTITIONER

## 2023-08-14 PROCEDURE — 3078F PR MOST RECENT DIASTOLIC BLOOD PRESSURE < 80 MM HG: ICD-10-PCS | Mod: CPTII,,, | Performed by: NURSE PRACTITIONER

## 2023-08-14 PROCEDURE — 87426 SARSCOV CORONAVIRUS AG IA: CPT | Mod: QW,,, | Performed by: NURSE PRACTITIONER

## 2023-08-14 PROCEDURE — 4010F PR ACE/ARB THEARPY RXD/TAKEN: ICD-10-PCS | Mod: CPTII,,, | Performed by: NURSE PRACTITIONER

## 2023-08-14 PROCEDURE — 87426 SARS CORONAVIRUS 2 ANTIGEN POCT: ICD-10-PCS | Mod: QW,,, | Performed by: NURSE PRACTITIONER

## 2023-08-14 PROCEDURE — 3074F PR MOST RECENT SYSTOLIC BLOOD PRESSURE < 130 MM HG: ICD-10-PCS | Mod: CPTII,,, | Performed by: NURSE PRACTITIONER

## 2023-08-14 PROCEDURE — 3078F DIAST BP <80 MM HG: CPT | Mod: CPTII,,, | Performed by: NURSE PRACTITIONER

## 2023-08-14 PROCEDURE — 3074F SYST BP LT 130 MM HG: CPT | Mod: CPTII,,, | Performed by: NURSE PRACTITIONER

## 2023-08-14 PROCEDURE — 1159F MED LIST DOCD IN RCRD: CPT | Mod: CPTII,,, | Performed by: NURSE PRACTITIONER

## 2023-08-14 PROCEDURE — 4010F ACE/ARB THERAPY RXD/TAKEN: CPT | Mod: CPTII,,, | Performed by: NURSE PRACTITIONER

## 2023-08-14 PROCEDURE — 99213 OFFICE O/P EST LOW 20 MIN: CPT | Mod: ,,, | Performed by: NURSE PRACTITIONER

## 2023-08-14 PROCEDURE — 3008F PR BODY MASS INDEX (BMI) DOCUMENTED: ICD-10-PCS | Mod: CPTII,,, | Performed by: NURSE PRACTITIONER

## 2023-08-14 PROCEDURE — 1160F PR REVIEW ALL MEDS BY PRESCRIBER/CLIN PHARMACIST DOCUMENTED: ICD-10-PCS | Mod: CPTII,,, | Performed by: NURSE PRACTITIONER

## 2023-08-14 PROCEDURE — 1160F RVW MEDS BY RX/DR IN RCRD: CPT | Mod: CPTII,,, | Performed by: NURSE PRACTITIONER

## 2023-08-14 PROCEDURE — 99213 PR OFFICE/OUTPT VISIT, EST, LEVL III, 20-29 MIN: ICD-10-PCS | Mod: ,,, | Performed by: NURSE PRACTITIONER

## 2023-08-14 PROCEDURE — 1159F PR MEDICATION LIST DOCUMENTED IN MEDICAL RECORD: ICD-10-PCS | Mod: CPTII,,, | Performed by: NURSE PRACTITIONER

## 2023-08-14 RX ORDER — PANTOPRAZOLE SODIUM 40 MG/1
40 TABLET, DELAYED RELEASE ORAL 2 TIMES DAILY
COMMUNITY
Start: 2023-08-07 | End: 2024-03-05

## 2023-08-14 RX ORDER — ONDANSETRON 4 MG/1
4 TABLET, FILM COATED ORAL EVERY 6 HOURS PRN
Qty: 30 TABLET | Refills: 0 | Status: SHIPPED | OUTPATIENT
Start: 2023-08-14 | End: 2024-03-05

## 2023-08-14 RX ORDER — AZITHROMYCIN 250 MG/1
TABLET, FILM COATED ORAL
Qty: 6 TABLET | Refills: 0 | Status: SHIPPED | OUTPATIENT
Start: 2023-08-14 | End: 2023-08-19

## 2023-08-14 RX ORDER — PREDNISONE 10 MG/1
TABLET ORAL
Qty: 13 TABLET | Refills: 0 | Status: SHIPPED | OUTPATIENT
Start: 2023-08-14 | End: 2024-03-05

## 2023-08-14 RX ORDER — LISINOPRIL 5 MG/1
TABLET ORAL
Qty: 90 TABLET | Refills: 1 | Status: SHIPPED | OUTPATIENT
Start: 2023-08-14 | End: 2024-03-05 | Stop reason: SDUPTHER

## 2023-08-14 RX ORDER — LISINOPRIL 2.5 MG/1
2.5 TABLET ORAL 2 TIMES DAILY
Qty: 90 TABLET | Refills: 1 | Status: SHIPPED | OUTPATIENT
Start: 2023-08-14 | End: 2024-01-26 | Stop reason: SDUPTHER

## 2023-08-14 NOTE — PROGRESS NOTES
Zahida Blanchard NP   [facility]  [address]  [place]  [address]      PATIENT NAME: Mally Woods  : 1965  DATE: 23  MRN: [id number]      Billing Provider: Zahida Blanchard NP  Level of Service: ME OFFICE/OUTPT VISIT, EST, LEVL III, 20-29 MIN  Patient PCP Information       Provider PCP Type    Zahida Blanchard NP General            Reason for Visit / Chief Complaint: COVID-19 Concerns (Patient had positive home Covid test  morning. Patient has sore throat, chills, diarrhea, emesis, and fever. )         History of Present Illness / Problem Focused Workflow     Mally Woods presents to the clinic with COVID-19 Concerns (Patient had positive home Covid test  morning. Patient has sore throat, chills, diarrhea, emesis, and fever. )     57 year old female presents to clinic with complaints of sore throat, chills, diarrhea, nausea and vomiting, and low grade fever. She states she tested positive for COVID at home yesterday morning. However, her employer wants her to be seen and tested at clinic.           Review of Systems     @Review of Systems   Constitutional:  Positive for fatigue and fever. Negative for activity change and appetite change.   HENT:  Positive for nasal congestion, rhinorrhea, sinus pressure/congestion and sore throat. Negative for ear pain.    Eyes:  Negative for pain, redness, visual disturbance and eye dryness.   Respiratory:  Positive for cough. Negative for shortness of breath.    Cardiovascular:  Negative for chest pain and leg swelling.   Gastrointestinal:  Positive for diarrhea, nausea and vomiting. Negative for abdominal distention, abdominal pain and constipation.   Endocrine: Negative for cold intolerance, heat intolerance and polyuria.   Genitourinary:  Negative for bladder incontinence, dysuria, frequency and urgency.   Musculoskeletal:  Negative for arthralgias, gait problem and myalgias.   Integumentary:  Negative for color change, rash  and wound.   Allergic/Immunologic: Negative for environmental allergies and food allergies.   Neurological:  Positive for headaches. Negative for dizziness, weakness and light-headedness.   Psychiatric/Behavioral:  Negative for behavioral problems and sleep disturbance.        Medical / Social / Family History     Past Medical History:   Diagnosis Date    Acute superficial gastritis without hemorrhage 10/21/2021    Anxiety     Aortic aneurysm 11/04/2020    w/o rupture    Cervical disc disorder     Chest pain in adult 10/21/2021    Depression     GERD (gastroesophageal reflux disease)     History of transient ischemic attack (TIA) 11/04/2020    Hyperlipidemia     Hypertension     Stroke     Thoracic aortic aneurysm     Vitamin deficiency        Past Surgical History:   Procedure Laterality Date    CARDIAC CATHETERIZATION Left 07/31/2008    Performed by Dr. Bruce Cramer    CARPAL TUNNEL RELEASE  12/21/2017    Dr. Moran with cyst removed from left middle finger    CHOLECYSTECTOMY      COLONOSCOPY      DIAGNOSTIC LAPAROSCOPY      DILATION AND CURETTAGE OF UTERUS      EGD, WITH BALLOON DILATION  2018    Performed by Dr. Marc Lomax    LEFT HEART CATHETERIZATION  05/25/2023    TOTAL REDUCTION MAMMOPLASTY         Social History  Ms.  reports that she has never smoked. She has been exposed to tobacco smoke. She has never used smokeless tobacco. She reports that she does not drink alcohol and does not use drugs.    Family History  Ms.'s family history includes Cervical cancer in her mother; Dementia in her father; Diabetes in her mother; Glaucoma in her mother; Hyperlipidemia in her father and mother; Hypertension in her father and mother; No Known Problems in her sister; Pancreatic cancer in her mother.    Medications and Allergies     Medications  Outpatient Medications Marked as Taking for the 8/14/23 encounter (Office Visit) with Zahida Blanchard NP   Medication Sig Dispense Refill    cyanocobalamin (VITAMIN  B-12) 1000 MCG tablet 1 tablet.      ergocalciferol (ERGOCALCIFEROL) 50,000 unit Cap TAKE 1 CAPSULE BY MOUTH TWICE WEEKLY AS DIRECTED 14 capsule 0    mecobalamin, vitamin B12, 1,000 mcg TbDL DISSOLVE 1 TABLET on the tongue ONCE DAILY 30 tablet 0    pantoprazole (PROTONIX) 40 MG tablet Take 40 mg by mouth 2 (two) times daily.      vit c-ascorbate Ca-ascorb sod (VITAMIN C) 500 mg/15 mL Liqd as directed      [DISCONTINUED] lisinopriL (PRINIVIL,ZESTRIL) 2.5 MG tablet Take 1 tablet (2.5 mg total) by mouth 2 (two) times daily. 90 tablet 1    [DISCONTINUED] lisinopriL (PRINIVIL,ZESTRIL) 5 MG tablet TAKE ONE TABLET BY MOUTH ONCE DAILY FOR high blood PRESSURE 90 tablet 1       Allergies  Review of patient's allergies indicates:   Allergen Reactions    Penicillins Hives and Rash     Can take rocephin  Other reaction(s): > 10 years ago  Can take rocephin      Celecoxib     Ciprofloxacin      States she can not take them due to aneurism     Fentanyl Other (See Comments)     Extreme sedation  Other reaction(s): Other (See Comments)  Extreme sedation  Extreme sedation      Hydrochlorothiazide Other (See Comments)     Reaction unknown  Other reaction(s): Other (See Comments)  Reaction unknown  Reaction unknown    Promethazine hcl Other (See Comments)     Hallucinations    Betamethasone Other (See Comments) and Rash     Flushing  Flushing  Flushing    Hydromorphone hcl Palpitations    Latex Rash    Morphine Palpitations    Sulfa (sulfonamide antibiotics) Itching       Physical Examination     Vitals:    08/14/23 1020   BP: 122/73   Pulse: 90   Resp: 17   Temp: 99.3 °F (37.4 °C)     Physical Exam  Vitals and nursing note reviewed.   Constitutional:       Appearance: Normal appearance.   HENT:      Head: Normocephalic.      Right Ear: Tympanic membrane normal.      Left Ear: Tympanic membrane normal.      Nose: Congestion and rhinorrhea present. Rhinorrhea is purulent.      Right Turbinates: Pale.      Left Turbinates: Pale.       Right Sinus: Maxillary sinus tenderness and frontal sinus tenderness present.      Left Sinus: Maxillary sinus tenderness and frontal sinus tenderness present.      Mouth/Throat:      Lips: Pink.      Mouth: Mucous membranes are moist.      Pharynx: Oropharyngeal exudate (clear post nasal drainage.) and posterior oropharyngeal erythema present.   Eyes:      Conjunctiva/sclera: Conjunctivae normal.   Cardiovascular:      Rate and Rhythm: Normal rate and regular rhythm.      Pulses: Normal pulses.      Heart sounds: Normal heart sounds.   Pulmonary:      Effort: Pulmonary effort is normal.      Breath sounds: Normal breath sounds. No wheezing or rhonchi.   Abdominal:      General: Abdomen is flat. Bowel sounds are normal. There is no distension.      Palpations: Abdomen is soft.      Tenderness: There is no abdominal tenderness.   Musculoskeletal:         General: Normal range of motion.      Cervical back: Normal range of motion.   Skin:     General: Skin is warm and dry.      Capillary Refill: Capillary refill takes less than 2 seconds.   Neurological:      General: No focal deficit present.      Mental Status: She is alert and oriented to person, place, and time. Mental status is at baseline.   Psychiatric:         Mood and Affect: Mood normal.         Behavior: Behavior normal.               Lab Results   Component Value Date    WBC 8.58 06/06/2023    HGB 13.1 06/06/2023    HCT 40.2 06/06/2023    MCV 93.5 06/06/2023     06/06/2023        CMP  Sodium   Date Value Ref Range Status   06/06/2023 140 136 - 145 mmol/L Final     Potassium   Date Value Ref Range Status   06/06/2023 3.9 3.5 - 5.1 mmol/L Final     Chloride   Date Value Ref Range Status   06/06/2023 104 98 - 107 mmol/L Final     CO2   Date Value Ref Range Status   06/06/2023 29 21 - 32 mmol/L Final     Glucose   Date Value Ref Range Status   06/06/2023 94 74 - 106 mg/dL Final     BUN   Date Value Ref Range Status   06/06/2023 10 7 - 18 mg/dL Final      Creatinine   Date Value Ref Range Status   06/06/2023 0.76 0.55 - 1.02 mg/dL Final     Calcium   Date Value Ref Range Status   06/06/2023 9.1 8.5 - 10.1 mg/dL Final     Total Protein   Date Value Ref Range Status   06/06/2023 7.4 6.4 - 8.2 g/dL Final     Albumin   Date Value Ref Range Status   06/06/2023 3.8 3.5 - 5.0 g/dL Final     Bilirubin, Total   Date Value Ref Range Status   06/06/2023 0.4 >0.0 - 1.2 mg/dL Final     Alk Phos   Date Value Ref Range Status   06/06/2023 107 46 - 118 U/L Final     AST   Date Value Ref Range Status   06/06/2023 24 15 - 37 U/L Final     ALT   Date Value Ref Range Status   06/06/2023 48 13 - 56 U/L Final     Anion Gap   Date Value Ref Range Status   06/06/2023 11 7 - 16 mmol/L Final     eGFR   Date Value Ref Range Status   06/06/2023 91 >=60 mL/min/1.73m2 Final     Procedures   Assessment and Plan (including Health Maintenance)   :    Plan:           Problem List Items Addressed This Visit          Cardiac/Vascular    Hypertension    Relevant Medications    lisinopriL (PRINIVIL,ZESTRIL) 2.5 MG tablet    lisinopriL (PRINIVIL,ZESTRIL) 5 MG tablet       ID    COVID-19 - Primary    Current Assessment & Plan     Zithromax and Prednisone as ordered.   Zofran as needed for nausea.   Discussed with Patient/Patient Guardian that etiology of Covid are viral in nature. Symptomatic treatment is recommended. Patient instructed to increase fluid intake and rest, alternate OTC Tylenol/Motrin for fever/pain. Instructed patient on CDC mandated quarantine of 5 days followed by 5 days of strict mask wearing for the protection of public from exposure and to limit the potential spread of the disease. Instructed patient to return to clinic if symptoms worsen or fail to improve.           Relevant Medications    ondansetron (ZOFRAN) 4 MG tablet    azithromycin (Z-VAYSL) 250 MG tablet    predniSONE (DELTASONE) 10 MG tablet    Other Relevant Orders    SARS Coronavirus 2 Antigen, POCT (Completed)        Health Maintenance Topics with due status: Not Due       Topic Last Completion Date    Colorectal Cancer Screening 12/14/2015    Hemoglobin A1c (Diabetic Prevention Screening) 12/22/2022    Cervical Cancer Screening 02/23/2023    Lipid Panel 03/08/2023    Influenza Vaccine 03/22/2023    Mammogram 05/04/2023    High Dose Statin 06/12/2023       Future Appointments   Date Time Provider Department Center   8/24/2023  9:00 AM [facility]   9/12/2023 10:15 AM Emily Mendoza, KASEYP RAS GASTR [facility]   7/17/2024  2:40 PM [facility] MAMMO1 [facility] MAMMO [facility]        Health Maintenance Due   Topic Date Due    Hepatitis C Screening  Never done    COVID-19 Vaccine (1) Never done    Pneumococcal Vaccines (Age 0-64) (1 - PCV) Never done    HIV Screening  Never done    TETANUS VACCINE  Never done    Shingles Vaccine (1 of 2) Never done        No follow-ups on file.     Signature:  Zahida Blanchard NP  [facility]  [address] [address]  [address]    Date of encounter: 8/14/23

## 2023-08-14 NOTE — LETTER
August 14, 2023      [facility]  [address] [address] [place] [address]  Phone: [phone]  Fax: [phone]       Patient: Mally Woods   YOB: 1965  Date of Visit: 08/14/2023    To Whom It May Concern:    Moises Woods  was at [facility] on 08/14/2023. The patient may return to work/school on 8/17/23 with no restrictions. If you have any questions or concerns, or if I can be of further assistance, please do not hesitate to contact me.    Sincerely,    Zahida Blanchard NP

## 2023-08-14 NOTE — ASSESSMENT & PLAN NOTE
Zithromax and Prednisone as ordered.   Zofran as needed for nausea.   Discussed with Patient/Patient Guardian that etiology of Covid are viral in nature. Symptomatic treatment is recommended. Patient instructed to increase fluid intake and rest, alternate OTC Tylenol/Motrin for fever/pain. Instructed patient on CDC mandated quarantine of 5 days followed by 5 days of strict mask wearing for the protection of public from exposure and to limit the potential spread of the disease. Instructed patient to return to clinic if symptoms worsen or fail to improve.

## 2023-08-14 NOTE — LETTER
August 17, 2023      [facility]  [address] [address] [place] [address]  Phone: [phone]  Fax: [phone]       Patient: Mally Woods   YOB: 1965  Date of Visit: 08/14/2023    To Whom It May Concern:    Moises Woods  was at [facility] on 08/14/2023. The patient may return to work/school on 08/21/2023 with no restrictions. If you have any questions or concerns, or if I can be of further assistance, please do not hesitate to contact me.    Sincerely,    Danica Brady RN

## 2023-08-24 ENCOUNTER — HOSPITAL ENCOUNTER (OUTPATIENT)
Dept: RADIOLOGY | Facility: HOSPITAL | Age: 58
Discharge: HOME | End: 2023-08-24
Attending: NURSE PRACTITIONER
Payer: COMMERCIAL

## 2023-08-24 DIAGNOSIS — K76.0 FATTY LIVER: ICD-10-CM

## 2023-08-24 PROCEDURE — 76981 USE PARENCHYMA: CPT | Mod: 26,,, | Performed by: STUDENT IN AN ORGANIZED HEALTH CARE EDUCATION/TRAINING PROGRAM

## 2023-08-24 PROCEDURE — 76981 USE PARENCHYMA: CPT | Mod: TC

## 2023-08-24 PROCEDURE — 76981 US ELASTOGRAPHY LIVER: ICD-10-PCS | Mod: 26,,, | Performed by: STUDENT IN AN ORGANIZED HEALTH CARE EDUCATION/TRAINING PROGRAM

## 2023-09-12 ENCOUNTER — OFFICE VISIT (OUTPATIENT)
Dept: GASTROENTEROLOGY | Facility: CLINIC | Age: 58
End: 2023-09-12
Payer: COMMERCIAL

## 2023-09-12 VITALS
HEIGHT: 65 IN | DIASTOLIC BLOOD PRESSURE: 79 MMHG | HEART RATE: 86 BPM | SYSTOLIC BLOOD PRESSURE: 136 MMHG | WEIGHT: 258.38 LBS | BODY MASS INDEX: 43.05 KG/M2

## 2023-09-12 DIAGNOSIS — K21.9 GASTROESOPHAGEAL REFLUX DISEASE, UNSPECIFIED WHETHER ESOPHAGITIS PRESENT: ICD-10-CM

## 2023-09-12 DIAGNOSIS — K76.0 FATTY LIVER: Primary | ICD-10-CM

## 2023-09-12 PROCEDURE — 3075F SYST BP GE 130 - 139MM HG: CPT | Mod: CPTII,,, | Performed by: NURSE PRACTITIONER

## 2023-09-12 PROCEDURE — 1160F PR REVIEW ALL MEDS BY PRESCRIBER/CLIN PHARMACIST DOCUMENTED: ICD-10-PCS | Mod: CPTII,,, | Performed by: NURSE PRACTITIONER

## 2023-09-12 PROCEDURE — 99214 OFFICE O/P EST MOD 30 MIN: CPT | Mod: S$PBB,,, | Performed by: NURSE PRACTITIONER

## 2023-09-12 PROCEDURE — 3008F BODY MASS INDEX DOCD: CPT | Mod: CPTII,,, | Performed by: NURSE PRACTITIONER

## 2023-09-12 PROCEDURE — 3078F DIAST BP <80 MM HG: CPT | Mod: CPTII,,, | Performed by: NURSE PRACTITIONER

## 2023-09-12 PROCEDURE — 4010F PR ACE/ARB THEARPY RXD/TAKEN: ICD-10-PCS | Mod: CPTII,,, | Performed by: NURSE PRACTITIONER

## 2023-09-12 PROCEDURE — 3008F PR BODY MASS INDEX (BMI) DOCUMENTED: ICD-10-PCS | Mod: CPTII,,, | Performed by: NURSE PRACTITIONER

## 2023-09-12 PROCEDURE — 99214 OFFICE O/P EST MOD 30 MIN: CPT | Mod: PBBFAC | Performed by: NURSE PRACTITIONER

## 2023-09-12 PROCEDURE — 3075F PR MOST RECENT SYSTOLIC BLOOD PRESS GE 130-139MM HG: ICD-10-PCS | Mod: CPTII,,, | Performed by: NURSE PRACTITIONER

## 2023-09-12 PROCEDURE — 1159F PR MEDICATION LIST DOCUMENTED IN MEDICAL RECORD: ICD-10-PCS | Mod: CPTII,,, | Performed by: NURSE PRACTITIONER

## 2023-09-12 PROCEDURE — 1160F RVW MEDS BY RX/DR IN RCRD: CPT | Mod: CPTII,,, | Performed by: NURSE PRACTITIONER

## 2023-09-12 PROCEDURE — 3078F PR MOST RECENT DIASTOLIC BLOOD PRESSURE < 80 MM HG: ICD-10-PCS | Mod: CPTII,,, | Performed by: NURSE PRACTITIONER

## 2023-09-12 PROCEDURE — 99214 PR OFFICE/OUTPT VISIT, EST, LEVL IV, 30-39 MIN: ICD-10-PCS | Mod: S$PBB,,, | Performed by: NURSE PRACTITIONER

## 2023-09-12 PROCEDURE — 4010F ACE/ARB THERAPY RXD/TAKEN: CPT | Mod: CPTII,,, | Performed by: NURSE PRACTITIONER

## 2023-09-12 PROCEDURE — 1159F MED LIST DOCD IN RCRD: CPT | Mod: CPTII,,, | Performed by: NURSE PRACTITIONER

## 2023-09-12 NOTE — PROGRESS NOTES
Mally Woods is a 57 y.o. female here for Follow-up        PCP: Zahida Blanchard  Referring Provider: No referring provider defined for this encounter.     HPI:  Presents for follow-up due to fatty liver.  Liver elastography on 08/24/2023, report reviewed, Metavir score of F0.  CT abdomen and pelvis on 06/06 showed severe diffuse fatty liver.  We have discussed weight loss and exercise.  Advised that she needs exercise of 150 minutes per week as well as 7-10% body weight loss.  Patient does not exercise.  Does not drink alcohol.  She has a history of reflux.  States that she is not currently taking any PPI or Pepcid.  She did have an upper GI on June 13th, esophagus and stomach are normal.  EGD dilation on 05/26/2023, 2 cm hiatal hernia.  She has had a recent COVID infection in August.  States that she continues to have a mild cough.  Labs from [facility] reviewed, on 06/11 liver enzymes are normal.  No anemia.  Last colonoscopy, 12/14/2015, recommendation to repeat in 10 years.    Follow-up  Associated symptoms include arthralgias. Pertinent negatives include no abdominal pain, change in bowel habit, chest pain, fatigue, fever, nausea or vomiting.         ROS:  Review of Systems   Constitutional:  Negative for appetite change, fatigue, fever and unexpected weight change.   HENT:  Negative for trouble swallowing.    Cardiovascular:  Positive for palpitations. Negative for chest pain.   Gastrointestinal:  Positive for reflux. Negative for abdominal pain, blood in stool, change in bowel habit, constipation, diarrhea, nausea, vomiting and change in bowel habit.   Musculoskeletal:  Positive for arthralgias. Negative for gait problem.   Integumentary:  Negative for pallor.   Psychiatric/Behavioral:  The patient is not nervous/anxious.           PMHX:  has a past medical history of Acute superficial gastritis without hemorrhage (10/21/2021), Anxiety, Aortic aneurysm (11/04/2020), Cervical disc  disorder, Chest pain in adult (10/21/2021), Depression, GERD (gastroesophageal reflux disease), History of transient ischemic attack (TIA) (11/04/2020), Hyperlipidemia, Hypertension, Stroke, Thoracic aortic aneurysm, and Vitamin deficiency.    PSHX:  has a past surgical history that includes Cholecystectomy; Carpal tunnel release (12/21/2017); Total Reduction Mammoplasty; Cardiac catheterization (Left, 07/31/2008); Dilation and curettage of uterus; Colonoscopy; egd, with balloon dilation (2018); Diagnostic laparoscopy; and Left heart catheterization (05/25/2023).    PFHX: family history includes Cervical cancer in her mother; Dementia in her father; Diabetes in her mother; Glaucoma in her mother; Hyperlipidemia in her father and mother; Hypertension in her father and mother; No Known Problems in her sister; Pancreatic cancer in her mother.    PSlHX:  reports that she has never smoked. She has been exposed to tobacco smoke. She has never used smokeless tobacco. She reports that she does not drink alcohol and does not use drugs.        Review of patient's allergies indicates:   Allergen Reactions    Penicillins Hives and Rash     Can take rocephin  Other reaction(s): > 10 years ago  Can take rocephin      Celecoxib     Ciprofloxacin      States she can not take them due to aneurism     Fentanyl Other (See Comments)     Extreme sedation  Other reaction(s): Other (See Comments)  Extreme sedation  Extreme sedation      Hydrochlorothiazide Other (See Comments)     Reaction unknown  Other reaction(s): Other (See Comments)  Reaction unknown  Reaction unknown    Promethazine hcl Other (See Comments)     Hallucinations    Betamethasone Other (See Comments) and Rash     Flushing  Flushing  Flushing    Hydromorphone hcl Palpitations    Latex Rash    Morphine Palpitations    Sulfa (sulfonamide antibiotics) Itching       Medication List with Changes/Refills   Current Medications    BACLOFEN (LIORESAL) 10 MG TABLET    TAKE ONE  "TABLET BY MOUTH THREE TIMES DAILY    CYANOCOBALAMIN (VITAMIN B-12) 1000 MCG TABLET    1 tablet.    ERGOCALCIFEROL (ERGOCALCIFEROL) 50,000 UNIT CAP    TAKE 1 CAPSULE BY MOUTH TWICE WEEKLY AS DIRECTED    ESCITALOPRAM OXALATE (LEXAPRO) 5 MG TAB    2 tablets.    LISINOPRIL (PRINIVIL,ZESTRIL) 2.5 MG TABLET    Take 1 tablet (2.5 mg total) by mouth 2 (two) times daily.    LISINOPRIL (PRINIVIL,ZESTRIL) 5 MG TABLET    TAKE ONE TABLET BY MOUTH ONCE DAILY FOR high blood PRESSURE    MECOBALAMIN, VITAMIN B12, 1,000 MCG TBDL    DISSOLVE 1 TABLET on the tongue ONCE DAILY    ONDANSETRON (ZOFRAN) 4 MG TABLET    Take 1 tablet (4 mg total) by mouth every 6 (six) hours as needed for Nausea.    ONDANSETRON (ZOFRAN-ODT) 4 MG TBDL    Take 1 tablet (4 mg total) by mouth every 8 (eight) hours as needed (nausea).    PANTOPRAZOLE (PROTONIX) 40 MG TABLET    Take 40 mg by mouth 2 (two) times daily.    PREDNISONE (DELTASONE) 10 MG TABLET    Take 3 tabs daily x 2 days, then 2 tabs daily x 2 days, then 1 tab daily x 2 days, then half tab daily x 2 days.    PYRILAMINE-CHLOPHEDIANOL (NINJACOF) 12.5-12.5 MG/5 ML LIQD    Take 10 mLs by mouth every 8 (eight) hours as needed (cough).    ROSUVASTATIN (CRESTOR) 5 MG TABLET    1 tablet.    VIT C-ASCORBATE CA-ASCORB SOD (VITAMIN C) 500 MG/15 ML LIQD    as directed        Objective Findings:  Vital Signs:  /79   Pulse 86   Ht 5' 5" (1.651 m)   Wt 117.2 kg (258 lb 6.4 oz)   LMP  (LMP Unknown)   BMI 43.00 kg/m²  Body mass index is 43 kg/m².    Physical Exam:  Physical Exam  Vitals and nursing note reviewed.   Constitutional:       General: She is not in acute distress.     Appearance: Normal appearance.   HENT:      Mouth/Throat:      Mouth: Mucous membranes are moist.   Cardiovascular:      Rate and Rhythm: Normal rate.   Pulmonary:      Effort: Pulmonary effort is normal.      Breath sounds: No wheezing, rhonchi or rales.   Abdominal:      General: Abdomen is protuberant. Bowel sounds are normal. " There is no distension.      Palpations: Abdomen is soft. There is no mass.      Tenderness: There is no abdominal tenderness. There is no guarding.      Hernia: No hernia is present.   Skin:     General: Skin is warm and dry.      Coloration: Skin is not jaundiced or pale.   Neurological:      Mental Status: She is alert and oriented to person, place, and time.   Psychiatric:         Mood and Affect: Mood normal.          Labs:  Lab Results   Component Value Date    WBC 8.58 06/06/2023    HGB 13.1 06/06/2023    HCT 40.2 06/06/2023    MCV 93.5 06/06/2023    RDW 12.6 06/06/2023     06/06/2023    LYMPH 20.2 (L) 06/06/2023    LYMPH 1.73 06/06/2023    MONO 7.1 (H) 06/06/2023    EOS 0.13 06/06/2023    BASO 0.04 06/06/2023     Lab Results   Component Value Date     06/06/2023    K 3.9 06/06/2023     06/06/2023    CO2 29 06/06/2023    GLU 94 06/06/2023    BUN 10 06/06/2023    CREATININE 0.76 06/06/2023    CALCIUM 9.1 06/06/2023    PROT 7.4 06/06/2023    ALBUMIN 3.8 06/06/2023    BILITOT 0.4 06/06/2023    ALKPHOS 107 06/06/2023    AST 24 06/06/2023    ALT 48 06/06/2023         Imaging: US Elastography Liver w/imaging    Result Date: 8/24/2023  EXAMINATION: US ELASTOGRAPHY LIVER W/ IMAGING CLINICAL HISTORY: Fatty (change of) liver, not elsewhere classified TECHNIQUE: Quantitative Ultrasound Assessment of the Liver (QUAL) elastography was performed on the [employer]. COMPARISON: 3/20/23 FINDINGS: Median elastography: 3.4 kPa. IQR/median: 5.6%, indicating an acceptable range     Metavir score, F0 Electronically signed by: Braeden Arce Date:    08/24/2023 Time:    09:00        Assessment:  Mally Woods is a 57 y.o. female here with:  1. Fatty liver    2. Gastroesophageal reflux disease, unspecified whether esophagitis present          Recommendations:  1. Exercise 150 minutes per week  Weight loss of 7-10%. Weight loss should be gradual  Diet low in saturated fats and carbohydrates  Good glucose and  cholesterol control  2. May restart PPI if reflux returns  3. Will be due for a colonoscopy 12/16/2025    Follow up in about 6 months (around 3/12/2024).      Order summary:       Thank you for allowing me to participate in the care of Mally Woods.      Emily Mendoza, MARY-C

## 2023-12-20 ENCOUNTER — HOSPITAL ENCOUNTER (OUTPATIENT)
Dept: RADIOLOGY | Facility: HOSPITAL | Age: 58
Discharge: HOME | End: 2023-12-20
Attending: ANESTHESIOLOGY
Payer: COMMERCIAL

## 2023-12-20 DIAGNOSIS — M25.561 RIGHT KNEE PAIN: ICD-10-CM

## 2023-12-20 DIAGNOSIS — M25.551 RIGHT HIP PAIN: ICD-10-CM

## 2023-12-20 PROCEDURE — 73502 X-RAY EXAM HIP UNI 2-3 VIEWS: CPT | Mod: TC,RT

## 2023-12-20 PROCEDURE — 73560 X-RAY EXAM OF KNEE 1 OR 2: CPT | Mod: TC,RT

## 2023-12-20 PROCEDURE — 73560 X-RAY EXAM OF KNEE 1 OR 2: CPT | Mod: 26,RT,, | Performed by: STUDENT IN AN ORGANIZED HEALTH CARE EDUCATION/TRAINING PROGRAM

## 2023-12-20 PROCEDURE — 73502 XR HIP WITH PELVIS WHEN PERFORMED, 2 OR 3  VIEWS RIGHT: ICD-10-PCS | Mod: 26,RT,, | Performed by: RADIOLOGY

## 2023-12-20 PROCEDURE — 73560 XR KNEE 1 OR 2 VIEW RIGHT: ICD-10-PCS | Mod: 26,RT,, | Performed by: STUDENT IN AN ORGANIZED HEALTH CARE EDUCATION/TRAINING PROGRAM

## 2023-12-20 PROCEDURE — 73502 X-RAY EXAM HIP UNI 2-3 VIEWS: CPT | Mod: 26,RT,, | Performed by: RADIOLOGY

## 2023-12-29 DIAGNOSIS — I10 PRIMARY HYPERTENSION: ICD-10-CM

## 2023-12-29 RX ORDER — LISINOPRIL 2.5 MG/1
2.5 TABLET ORAL 2 TIMES DAILY
Qty: 90 TABLET | Refills: 1 | OUTPATIENT
Start: 2023-12-29

## 2024-01-02 ENCOUNTER — OFFICE VISIT (OUTPATIENT)
Dept: FAMILY MEDICINE | Facility: CLINIC | Age: 59
End: 2024-01-02
Payer: COMMERCIAL

## 2024-01-02 VITALS
OXYGEN SATURATION: 98 % | WEIGHT: 241 LBS | SYSTOLIC BLOOD PRESSURE: 132 MMHG | BODY MASS INDEX: 40.15 KG/M2 | RESPIRATION RATE: 20 BRPM | DIASTOLIC BLOOD PRESSURE: 86 MMHG | HEIGHT: 65 IN | HEART RATE: 68 BPM | TEMPERATURE: 98 F

## 2024-01-02 DIAGNOSIS — I71.21 ANEURYSM OF ASCENDING AORTA WITHOUT RUPTURE: ICD-10-CM

## 2024-01-02 DIAGNOSIS — E66.01 MORBID (SEVERE) OBESITY DUE TO EXCESS CALORIES: ICD-10-CM

## 2024-01-02 DIAGNOSIS — J01.40 ACUTE NON-RECURRENT PANSINUSITIS: ICD-10-CM

## 2024-01-02 DIAGNOSIS — J02.9 SORE THROAT: ICD-10-CM

## 2024-01-02 DIAGNOSIS — R50.9 FEVER, UNSPECIFIED FEVER CAUSE: ICD-10-CM

## 2024-01-02 DIAGNOSIS — R05.9 COUGH, UNSPECIFIED TYPE: Primary | ICD-10-CM

## 2024-01-02 LAB
CTP QC/QA: YES
CTP QC/QA: YES
S PYO RRNA THROAT QL PROBE: NEGATIVE
SARS-COV-2 AG RESP QL IA.RAPID: NEGATIVE

## 2024-01-02 PROCEDURE — 1160F RVW MEDS BY RX/DR IN RCRD: CPT | Mod: CPTII,,, | Performed by: NURSE PRACTITIONER

## 2024-01-02 PROCEDURE — 99213 OFFICE O/P EST LOW 20 MIN: CPT | Mod: ,,, | Performed by: NURSE PRACTITIONER

## 2024-01-02 PROCEDURE — 3075F SYST BP GE 130 - 139MM HG: CPT | Mod: CPTII,,, | Performed by: NURSE PRACTITIONER

## 2024-01-02 PROCEDURE — 3008F BODY MASS INDEX DOCD: CPT | Mod: CPTII,,, | Performed by: NURSE PRACTITIONER

## 2024-01-02 PROCEDURE — 87426 SARSCOV CORONAVIRUS AG IA: CPT | Mod: QW,,, | Performed by: NURSE PRACTITIONER

## 2024-01-02 PROCEDURE — 87880 STREP A ASSAY W/OPTIC: CPT | Mod: QW,,, | Performed by: NURSE PRACTITIONER

## 2024-01-02 PROCEDURE — 1159F MED LIST DOCD IN RCRD: CPT | Mod: CPTII,,, | Performed by: NURSE PRACTITIONER

## 2024-01-02 PROCEDURE — 3079F DIAST BP 80-89 MM HG: CPT | Mod: CPTII,,, | Performed by: NURSE PRACTITIONER

## 2024-01-02 RX ORDER — IPRATROPIUM BROMIDE 21 UG/1
2 SPRAY, METERED NASAL 3 TIMES DAILY
Qty: 30 ML | Refills: 0 | Status: SHIPPED | OUTPATIENT
Start: 2024-01-02

## 2024-01-02 RX ORDER — AZITHROMYCIN 250 MG/1
TABLET, FILM COATED ORAL
Qty: 6 TABLET | Refills: 0 | Status: SHIPPED | OUTPATIENT
Start: 2024-01-02 | End: 2024-01-07

## 2024-01-02 NOTE — PROGRESS NOTES
Zahida Blanchard NP   [place] [address]  [place]  [address]      PATIENT NAME: Mally Woods  : 1965  DATE: 24  MRN: [id number]      Billing Provider: Zahida Blanchard NP  Level of Service: [place] OFFICE/OUTPT VISIT, EST, LEVL III, 20-29 MIN  Patient PCP Information       Provider PCP Type    Zahida Blanchard NP General            Reason for Visit / Chief Complaint: Cough (Patient reports beginning Saturday she has had a dry non productive cough. ), Sore Throat (Patient reports she had a ongoing sore throat. Has not taken any OTC medication), Fever (Patient reports low grade fever 99 this morning.  States several co- workers have been sick. ), and Headache (Patient has ongoing headache x 2 weeks.)         History of Present Illness / Problem Focused Workflow     58 year old female presents to clinic with complaints of dry non productive cough, sore throat, nasal congestion, and low grade fever that started this AM (around 99F). Reports several co workers have been sick with similar symptoms.           Review of Systems     @Review of Systems   Constitutional:  Positive for fever. Negative for activity change, appetite change and fatigue.   HENT:  Positive for nasal congestion, rhinorrhea, sinus pressure/congestion and sore throat. Negative for ear pain.    Eyes:  Negative for pain, redness, visual disturbance and eye dryness.   Respiratory:  Positive for cough. Negative for shortness of breath.    Cardiovascular:  Negative for chest pain and leg swelling.   Gastrointestinal:  Negative for abdominal distention, abdominal pain, constipation and diarrhea.   Endocrine: Negative for cold intolerance, heat intolerance and polyuria.   Genitourinary:  Negative for bladder incontinence, dysuria, frequency and urgency.   Musculoskeletal:  Negative for arthralgias, gait problem and myalgias.   Integumentary:  Negative for color change, rash and wound.   Allergic/Immunologic: Negative for  environmental allergies and food allergies.   Neurological:  Negative for dizziness, weakness, light-headedness and headaches.   Psychiatric/Behavioral:  Negative for behavioral problems and sleep disturbance.        Medical / Social / Family History     Past Medical History:   Diagnosis Date    Acute superficial gastritis without hemorrhage 10/21/2021    Anxiety     Aortic aneurysm 11/04/2020    w/o rupture    Cervical disc disorder     Chest pain in adult 10/21/2021    Depression     GERD (gastroesophageal reflux disease)     History of transient ischemic attack (TIA) 11/04/2020    Hyperlipidemia     Hypertension     Stroke     Thoracic aortic aneurysm     Vitamin deficiency        Past Surgical History:   Procedure Laterality Date    CARDIAC CATHETERIZATION Left 07/31/2008    Performed by Dr. Bruce Cramer    CARPAL TUNNEL RELEASE  12/21/2017    Dr. Moran with cyst removed from left middle finger    CHOLECYSTECTOMY      COLONOSCOPY      DIAGNOSTIC LAPAROSCOPY      DILATION AND CURETTAGE OF UTERUS      EGD, WITH BALLOON DILATION  2018    Performed by Dr. Marc Lomax    LEFT HEART CATHETERIZATION  05/25/2023    TOTAL REDUCTION MAMMOPLASTY         Medications and Allergies     Medications  Outpatient Medications Marked as Taking for the 1/2/24 encounter (Office Visit) with Zahida Blanchard NP   Medication Sig Dispense Refill    cyanocobalamin (VITAMIN B-12) 1000 MCG tablet 1 tablet.      ergocalciferol (ERGOCALCIFEROL) 50,000 unit Cap TAKE 1 CAPSULE BY MOUTH TWICE WEEKLY AS DIRECTED 14 capsule 0    lisinopriL (PRINIVIL,ZESTRIL) 2.5 MG tablet Take 1 tablet (2.5 mg total) by mouth 2 (two) times daily. 90 tablet 1    lisinopriL (PRINIVIL,ZESTRIL) 5 MG tablet TAKE ONE TABLET BY MOUTH ONCE DAILY FOR high blood PRESSURE 90 tablet 1    mecobalamin, vitamin B12, 1,000 mcg TbDL DISSOLVE 1 TABLET on the tongue ONCE DAILY 30 tablet 0    ondansetron (ZOFRAN) 4 MG tablet Take 1 tablet (4 mg total) by mouth every 6 (six)  hours as needed for Nausea. 30 tablet 0    vit c-ascorbate Ca-ascorb sod (VITAMIN C) 500 mg/15 mL Liqd as directed         Allergies  Review of patient's allergies indicates:   Allergen Reactions    Penicillins Hives and Rash     Can take rocephin  Other reaction(s): > 10 years ago  Can take rocephin      Celecoxib     Ciprofloxacin      States she can not take them due to aneurism     Fentanyl Other (See Comments)     Extreme sedation  Other reaction(s): Other (See Comments)  Extreme sedation  Extreme sedation      Hydrochlorothiazide Other (See Comments)     Reaction unknown  Other reaction(s): Other (See Comments)  Reaction unknown  Reaction unknown    Promethazine hcl Other (See Comments)     Hallucinations    Betamethasone Other (See Comments) and Rash     Flushing  Flushing  Flushing    Hydromorphone hcl Palpitations    Latex Rash    Morphine Palpitations    Sulfa (sulfonamide antibiotics) Itching       Physical Examination     Vitals:    01/02/24 1452   BP: 132/86   Pulse: 68   Resp: 20   Temp: 98.1 °F (36.7 °C)     Physical Exam  Vitals and nursing note reviewed.   Constitutional:       Appearance: Normal appearance.   HENT:      Head: Normocephalic.      Right Ear: Tympanic membrane normal.      Left Ear: Tympanic membrane normal.      Nose: Congestion and rhinorrhea present. Rhinorrhea is purulent.      Right Turbinates: Pale.      Left Turbinates: Pale.      Right Sinus: Maxillary sinus tenderness and frontal sinus tenderness present.      Left Sinus: Maxillary sinus tenderness and frontal sinus tenderness present.      Mouth/Throat:      Lips: Pink.      Mouth: Mucous membranes are moist.      Pharynx: Oropharyngeal exudate (clear post nasal drainage.) and posterior oropharyngeal erythema present.   Eyes:      Conjunctiva/sclera: Conjunctivae normal.   Cardiovascular:      Rate and Rhythm: Normal rate and regular rhythm.      Pulses: Normal pulses.      Heart sounds: Normal heart sounds.   Pulmonary:       Effort: Pulmonary effort is normal.      Breath sounds: Normal breath sounds. No wheezing or rhonchi.   Abdominal:      General: Abdomen is flat. Bowel sounds are normal. There is no distension.      Palpations: Abdomen is soft.      Tenderness: There is no abdominal tenderness.   Musculoskeletal:         General: Normal range of motion.      Cervical back: Normal range of motion.   Skin:     General: Skin is warm and dry.      Capillary Refill: Capillary refill takes less than 2 seconds.   Neurological:      General: No focal deficit present.      Mental Status: She is alert and oriented to person, place, and time. Mental status is at baseline.   Psychiatric:         Mood and Affect: Mood normal.         Behavior: Behavior normal.               Lab Results   Component Value Date    WBC 8.58 06/06/2023    HGB 13.1 06/06/2023    HCT 40.2 06/06/2023    MCV 93.5 06/06/2023     06/06/2023        CMP  Sodium   Date Value Ref Range Status   06/06/2023 140 136 - 145 mmol/L Final     Potassium   Date Value Ref Range Status   06/06/2023 3.9 3.5 - 5.1 mmol/L Final     Chloride   Date Value Ref Range Status   06/06/2023 104 98 - 107 mmol/L Final     CO2   Date Value Ref Range Status   06/06/2023 29 21 - 32 mmol/L Final     Glucose   Date Value Ref Range Status   06/06/2023 94 74 - 106 mg/dL Final     BUN   Date Value Ref Range Status   06/06/2023 10 7 - 18 mg/dL Final     Creatinine   Date Value Ref Range Status   06/06/2023 0.76 0.55 - 1.02 mg/dL Final     Calcium   Date Value Ref Range Status   06/06/2023 9.1 8.5 - 10.1 mg/dL Final     Total Protein   Date Value Ref Range Status   06/06/2023 7.4 6.4 - 8.2 g/dL Final     Albumin   Date Value Ref Range Status   06/06/2023 3.8 3.5 - 5.0 g/dL Final     Bilirubin, Total   Date Value Ref Range Status   06/06/2023 0.4 >0.0 - 1.2 mg/dL Final     Alk Phos   Date Value Ref Range Status   06/06/2023 107 46 - 118 U/L Final     AST   Date Value Ref Range Status   06/06/2023 24 15  - 37 U/L Final     ALT   Date Value Ref Range Status   06/06/2023 48 13 - 56 U/L Final     Anion Gap   Date Value Ref Range Status   06/06/2023 11 7 - 16 mmol/L Final     eGFR   Date Value Ref Range Status   06/06/2023 91 >=60 mL/min/1.73m2 Final     Procedures   Assessment and Plan (including Health Maintenance)   :    Plan:     Problem List Items Addressed This Visit          ENT    Acute non-recurrent pansinusitis    Current Assessment & Plan     Zithromax as ordered.   Atrovent and Ed a hist as needed. Monitor BP while taking Ed a Hist.     Reviewed pathology of current symptoms, medication side effects/risk/benefits/directions on taking medications, and worsening or persistent symptoms that require follow up in next 2-3 days. Saline/steroid nasal sprays, antihistamine use, increase fluid intake, and multivitamin/elderberry/Zinc use were recommended. May take Tylenol or Motrin as needed for pain and/or fever. Patient was instructed to take antibiotic as directed, complete entire course to avoid antibiotic resistance, and take OTC probiotic with antibiotic to prevent GI upset. Patient verbalized understanding of treatment plan and denies any questions.                            Cardiac/Vascular    Aneurysm of ascending aorta without rupture    Current Assessment & Plan     Patient sees Dr Lange at [facility] twice yearly for monitoring of aortic aneurysm. She just had CTA done in Dec 23 that showed aneurysm was stable.             Endocrine    Morbid (severe) obesity due to excess calories    Current Assessment & Plan     She is obese which complicates all other co morbidities. Discussed 1500 mickey diet as well as increased exercise as tolerated.           Other Visit Diagnoses       Cough, unspecified type    -  Primary    Relevant Orders    SARS Coronavirus 2 Antigen, POCT (Completed)    POCT rapid strep A (Completed)    Sore throat        Relevant Orders    SARS Coronavirus 2 Antigen, POCT (Completed)     POCT rapid strep A (Completed)    Fever, unspecified fever cause        Relevant Orders    SARS Coronavirus 2 Antigen, POCT (Completed)    POCT rapid strep A (Completed)            Health Maintenance Topics with due status: Not Due       Topic Last Completion Date    Colorectal Cancer Screening 12/14/2015    Hemoglobin A1c (Diabetic Prevention Screening) 12/22/2022    Cervical Cancer Screening 02/23/2023    Lipid Panel 03/08/2023    Mammogram 05/04/2023    High Dose Statin 09/12/2023       Future Appointments   Date Time Provider Department Center   3/12/2024 10:00 AM Emily Mendoza FNP [facility]   7/17/2024  2:40 PM [facility] MAMMO1 [facility] MAMMO [facility]        Health Maintenance Due   Topic Date Due    Hepatitis C Screening  Never done    COVID-19 Vaccine (1) Never done    Pneumococcal Vaccines (Age 0-64) (1 - PCV) Never done    HIV Screening  Never done    TETANUS VACCINE  Never done    Shingles Vaccine (1 of 2) Never done    Influenza Vaccine (1) 09/01/2023          Signature:  Zahida Blanchard NP  [address]  [address]    Date of encounter: 1/2/24

## 2024-01-02 NOTE — ASSESSMENT & PLAN NOTE
Patient sees Dr Lange at [facility] twice yearly for monitoring of aortic aneurysm. She just had CTA done in Dec 23 that showed aneurysm was stable.

## 2024-01-02 NOTE — ASSESSMENT & PLAN NOTE
Zithromax as ordered.   Atrovent and Ed a hist as needed. Monitor BP while taking Ed a Hist.     Reviewed pathology of current symptoms, medication side effects/risk/benefits/directions on taking medications, and worsening or persistent symptoms that require follow up in next 2-3 days. Saline/steroid nasal sprays, antihistamine use, increase fluid intake, and multivitamin/elderberry/Zinc use were recommended. May take Tylenol or Motrin as needed for pain and/or fever. Patient was instructed to take antibiotic as directed, complete entire course to avoid antibiotic resistance, and take OTC probiotic with antibiotic to prevent GI upset. Patient verbalized understanding of treatment plan and denies any questions.

## 2024-01-02 NOTE — ASSESSMENT & PLAN NOTE
She is obese which complicates all other co morbidities. Discussed 1500 mickey diet as well as increased exercise as tolerated.

## 2024-01-02 NOTE — LETTER
January 2, 2024      [facility]  [address] [place] [address] [place] [address]  Phone: [phone]  Fax: [phone]       Patient: Mally Woods   YOB: 1965  Date of Visit: 01/02/2024    To Whom It May Concern:    Moises Woods  was at  on 01/02/2024. The patient may return to work/school on 1/3/24 with no restrictions. If you have any questions or concerns, or if I can be of further assistance, please do not hesitate to contact me.    Sincerely,    Zahida Blanchard NP

## 2024-01-26 DIAGNOSIS — I10 PRIMARY HYPERTENSION: ICD-10-CM

## 2024-01-26 RX ORDER — LISINOPRIL 2.5 MG/1
2.5 TABLET ORAL 2 TIMES DAILY
Qty: 30 TABLET | Refills: 0 | Status: SHIPPED | OUTPATIENT
Start: 2024-01-26 | End: 2024-03-01 | Stop reason: SDUPTHER

## 2024-01-26 RX ORDER — LISINOPRIL 2.5 MG/1
2.5 TABLET ORAL 2 TIMES DAILY
Qty: 90 TABLET | Refills: 1 | OUTPATIENT
Start: 2024-01-26

## 2024-02-02 DIAGNOSIS — I10 PRIMARY HYPERTENSION: ICD-10-CM

## 2024-02-05 RX ORDER — LISINOPRIL 2.5 MG/1
2.5 TABLET ORAL 2 TIMES DAILY
Qty: 30 TABLET | Refills: 0 | OUTPATIENT
Start: 2024-02-05

## 2024-02-20 ENCOUNTER — OFFICE VISIT (OUTPATIENT)
Dept: GASTROENTEROLOGY | Facility: CLINIC | Age: 59
End: 2024-02-20
Payer: COMMERCIAL

## 2024-02-20 VITALS
BODY MASS INDEX: 39.32 KG/M2 | WEIGHT: 236 LBS | DIASTOLIC BLOOD PRESSURE: 73 MMHG | SYSTOLIC BLOOD PRESSURE: 133 MMHG | HEART RATE: 84 BPM | HEIGHT: 65 IN

## 2024-02-20 DIAGNOSIS — K76.0 FATTY LIVER: Primary | ICD-10-CM

## 2024-02-20 PROCEDURE — 99215 OFFICE O/P EST HI 40 MIN: CPT | Mod: PBBFAC | Performed by: NURSE PRACTITIONER

## 2024-02-20 PROCEDURE — 4010F ACE/ARB THERAPY RXD/TAKEN: CPT | Mod: CPTII,,, | Performed by: NURSE PRACTITIONER

## 2024-02-20 PROCEDURE — 99214 OFFICE O/P EST MOD 30 MIN: CPT | Mod: S$PBB,,, | Performed by: NURSE PRACTITIONER

## 2024-02-20 PROCEDURE — 3008F BODY MASS INDEX DOCD: CPT | Mod: CPTII,,, | Performed by: NURSE PRACTITIONER

## 2024-02-20 PROCEDURE — 1160F RVW MEDS BY RX/DR IN RCRD: CPT | Mod: CPTII,,, | Performed by: NURSE PRACTITIONER

## 2024-02-20 PROCEDURE — 3078F DIAST BP <80 MM HG: CPT | Mod: CPTII,,, | Performed by: NURSE PRACTITIONER

## 2024-02-20 PROCEDURE — 3075F SYST BP GE 130 - 139MM HG: CPT | Mod: CPTII,,, | Performed by: NURSE PRACTITIONER

## 2024-02-20 PROCEDURE — 1159F MED LIST DOCD IN RCRD: CPT | Mod: CPTII,,, | Performed by: NURSE PRACTITIONER

## 2024-02-20 NOTE — PROGRESS NOTES
Mally Woods is a 58 y.o. female here for Follow-up        PCP: Zahida Blanchard  Referring Provider: Emily Mendoza, [place]  [address] [place] [address] [place] [address]     HPI:  Presents for follow-up due to fatty liver.  She has lost 22 lb since June of 2023.  Reports that she is on a low carb diet. Is avoiding sugar drinks.Liver elastography on 08/24/2023, report reviewed, Metavir score of F0. CT abdomen and pelvis on 06/06 showed. EGD dilation on 05/26/2023, 2 cm hiatal hernia. H pylori is negative.  Denies dysphagia.  Chronic heartburn and reflux symptoms.  He is currently taking pantoprazole.  Last colonoscopy was 12/14/2015, recommendation to repeat in 10 years.  No hematochezia or melena.    Follow-up  Pertinent negatives include no abdominal pain, change in bowel habit, chest pain, fatigue, fever, nausea or vomiting.         ROS:  Review of Systems   Constitutional:  Negative for appetite change, fatigue, fever and unexpected weight change.   HENT:  Negative for trouble swallowing.    Respiratory:  Negative for shortness of breath.    Cardiovascular:  Negative for chest pain.   Gastrointestinal:  Negative for abdominal pain, blood in stool, change in bowel habit, constipation, diarrhea, nausea, vomiting and reflux.   Musculoskeletal:  Negative for gait problem.   Integumentary:  Negative for pallor.   Psychiatric/Behavioral:  The patient is not nervous/anxious.           PMHX:  has a past medical history of Acute superficial gastritis without hemorrhage (10/21/2021), Anxiety, Aortic aneurysm (11/04/2020), Cervical disc disorder, Chest pain in adult (10/21/2021), Depression, GERD (gastroesophageal reflux disease), History of transient ischemic attack (TIA) (11/04/2020), Hyperlipidemia, Hypertension, Stroke, Thoracic aortic aneurysm, and Vitamin deficiency.    PSHX:  has a past surgical history that includes Cholecystectomy; Carpal tunnel release (12/21/2017); Total Reduction  Mammoplasty; Cardiac catheterization (Left, 07/31/2008); Dilation and curettage of uterus; Colonoscopy; egd, with balloon dilation (2018); Diagnostic laparoscopy; and Left heart catheterization (05/25/2023).    PFHX: family history includes Cervical cancer in her mother; Dementia in her father; Diabetes in her mother; Glaucoma in her mother; Hyperlipidemia in her father and mother; Hypertension in her father and mother; No Known Problems in her sister; Pancreatic cancer in her mother.    PSlHX:  reports that she has never smoked. She has been exposed to tobacco smoke. She has never used smokeless tobacco. She reports that she does not drink alcohol and does not use drugs.        Review of patient's allergies indicates:   Allergen Reactions    Penicillins Hives and Rash     Can take rocephin  Other reaction(s): > 10 years ago  Can take rocephin      Celecoxib     Ciprofloxacin      States she can not take them due to aneurism     Fentanyl Other (See Comments)     Extreme sedation  Other reaction(s): Other (See Comments)  Extreme sedation  Extreme sedation      Hydrochlorothiazide Other (See Comments)     Reaction unknown  Other reaction(s): Other (See Comments)  Reaction unknown  Reaction unknown    Promethazine hcl Other (See Comments)     Hallucinations    Betamethasone Other (See Comments) and Rash     Flushing  Flushing  Flushing    Hydromorphone hcl Palpitations    Latex Rash    Morphine Palpitations    Sulfa (sulfonamide antibiotics) Itching       Medication List with Changes/Refills   Current Medications    BACLOFEN (LIORESAL) 10 MG TABLET    TAKE ONE TABLET BY MOUTH THREE TIMES DAILY    CYANOCOBALAMIN (VITAMIN B-12) 1000 MCG TABLET    1 tablet.    ERGOCALCIFEROL (ERGOCALCIFEROL) 50,000 UNIT CAP    TAKE 1 CAPSULE BY MOUTH TWICE WEEKLY AS DIRECTED    ESCITALOPRAM OXALATE (LEXAPRO) 5 MG TAB    2 tablets.    IPRATROPIUM (ATROVENT) 21 MCG (0.03 %) NASAL SPRAY    2 sprays by Each Nostril route 3 (three) times  "daily.    LISINOPRIL (PRINIVIL,ZESTRIL) 2.5 MG TABLET    Take 1 tablet (2.5 mg total) by mouth 2 (two) times daily.    LISINOPRIL (PRINIVIL,ZESTRIL) 5 MG TABLET    TAKE ONE TABLET BY MOUTH ONCE DAILY FOR high blood PRESSURE    MECOBALAMIN, VITAMIN B12, 1,000 MCG TBDL    DISSOLVE 1 TABLET on the tongue ONCE DAILY    ONDANSETRON (ZOFRAN) 4 MG TABLET    Take 1 tablet (4 mg total) by mouth every 6 (six) hours as needed for Nausea.    ONDANSETRON (ZOFRAN-ODT) 4 MG TBDL    Take 1 tablet (4 mg total) by mouth every 8 (eight) hours as needed (nausea).    PANTOPRAZOLE (PROTONIX) 40 MG TABLET    Take 40 mg by mouth 2 (two) times daily.    PREDNISONE (DELTASONE) 10 MG TABLET    Take 3 tabs daily x 2 days, then 2 tabs daily x 2 days, then 1 tab daily x 2 days, then half tab daily x 2 days.    PYRILAMINE-CHLOPHEDIANOL (NINJACOF) 12.5-12.5 MG/5 ML LIQD    Take 10 mLs by mouth every 8 (eight) hours as needed (cough).    ROSUVASTATIN (CRESTOR) 5 MG TABLET    1 tablet.    VIT C-ASCORBATE CA-ASCORB SOD (VITAMIN C) 500 MG/15 ML LIQD    as directed        Objective Findings:  Vital Signs:  /73   Pulse 84   Ht 5' 5" (1.651 m)   Wt 107 kg (236 lb)   LMP  (LMP Unknown)   BMI 39.27 kg/m²  Body mass index is 39.27 kg/m².    Physical Exam:  Physical Exam  Vitals and nursing note reviewed.   Constitutional:       General: She is not in acute distress.     Appearance: Normal appearance. She is obese.   HENT:      Mouth/Throat:      Mouth: Mucous membranes are moist.   Cardiovascular:      Rate and Rhythm: Normal rate.   Pulmonary:      Effort: Pulmonary effort is normal.      Breath sounds: No wheezing, rhonchi or rales.   Abdominal:      General: Bowel sounds are normal. There is no distension.      Palpations: Abdomen is soft. There is no mass.      Tenderness: There is no abdominal tenderness. There is no guarding.   Skin:     General: Skin is warm and dry.      Coloration: Skin is not jaundiced or pale.   Neurological:      " Mental Status: She is alert and oriented to person, place, and time.   Psychiatric:         Mood and Affect: Mood normal.          Labs:  Lab Results   Component Value Date    WBC 8.58 06/06/2023    HGB 13.1 06/06/2023    HCT 40.2 06/06/2023    MCV 93.5 06/06/2023    RDW 12.6 06/06/2023     06/06/2023    LYMPH 20.2 (L) 06/06/2023    LYMPH 1.73 06/06/2023    MONO 7.1 (H) 06/06/2023    EOS 0.13 06/06/2023    BASO 0.04 06/06/2023     Lab Results   Component Value Date     06/06/2023    K 3.9 06/06/2023     06/06/2023    CO2 29 06/06/2023    GLU 94 06/06/2023    BUN 10 06/06/2023    CREATININE 0.76 06/06/2023    CALCIUM 9.1 06/06/2023    PROT 7.4 06/06/2023    ALBUMIN 3.8 06/06/2023    BILITOT 0.4 06/06/2023    ALKPHOS 107 06/06/2023    AST 24 06/06/2023    ALT 48 06/06/2023         Imaging: No results found.      Assessment:  Mally Woods is a 58 y.o. female here with:  1. Fatty liver          Recommendations:  1.   Abdominal ultrasound  Exercise 150 minutes per week  Weight loss of 7-10%. Weight loss should be gradual  Diet low in saturated fats and carbohydrates  Good glucose and cholesterol control  2.  CBC, CMP  3.  Colonoscopy will be due in December 2025  4.  Patient will call for follow-up    No follow-ups on file.      Order summary:  Orders Placed This Encounter    US Abdomen Limited    Comprehensive Metabolic Panel    CBC Auto Differential       Thank you for allowing me to participate in the care of Mally Woods.      JOSE Melgar

## 2024-03-01 DIAGNOSIS — I10 PRIMARY HYPERTENSION: Primary | ICD-10-CM

## 2024-03-01 RX ORDER — LISINOPRIL 2.5 MG/1
2.5 TABLET ORAL 2 TIMES DAILY
Qty: 60 TABLET | Refills: 0 | Status: SHIPPED | OUTPATIENT
Start: 2024-03-01 | End: 2024-03-05 | Stop reason: SDUPTHER

## 2024-03-05 ENCOUNTER — OFFICE VISIT (OUTPATIENT)
Dept: FAMILY MEDICINE | Facility: CLINIC | Age: 59
End: 2024-03-05
Payer: COMMERCIAL

## 2024-03-05 VITALS
RESPIRATION RATE: 18 BRPM | DIASTOLIC BLOOD PRESSURE: 86 MMHG | HEART RATE: 58 BPM | WEIGHT: 234.81 LBS | OXYGEN SATURATION: 98 % | BODY MASS INDEX: 39.12 KG/M2 | HEIGHT: 65 IN | SYSTOLIC BLOOD PRESSURE: 128 MMHG | TEMPERATURE: 98 F

## 2024-03-05 DIAGNOSIS — I10 PRIMARY HYPERTENSION: ICD-10-CM

## 2024-03-05 DIAGNOSIS — E78.5 HYPERLIPIDEMIA, UNSPECIFIED HYPERLIPIDEMIA TYPE: Primary | ICD-10-CM

## 2024-03-05 DIAGNOSIS — E55.9 VITAMIN D DEFICIENCY: ICD-10-CM

## 2024-03-05 PROBLEM — J01.40 ACUTE NON-RECURRENT PANSINUSITIS: Status: RESOLVED | Noted: 2023-03-22 | Resolved: 2024-03-05

## 2024-03-05 PROBLEM — T14.90XA INHALATION INJURY: Status: RESOLVED | Noted: 2022-09-11 | Resolved: 2024-03-05

## 2024-03-05 PROBLEM — U07.1 COVID-19: Status: RESOLVED | Noted: 2023-08-14 | Resolved: 2024-03-05

## 2024-03-05 PROCEDURE — 1159F MED LIST DOCD IN RCRD: CPT | Mod: CPTII,,, | Performed by: NURSE PRACTITIONER

## 2024-03-05 PROCEDURE — 82306 VITAMIN D 25 HYDROXY: CPT | Mod: ,,, | Performed by: CLINICAL MEDICAL LABORATORY

## 2024-03-05 PROCEDURE — 80061 LIPID PANEL: CPT | Mod: ,,, | Performed by: CLINICAL MEDICAL LABORATORY

## 2024-03-05 PROCEDURE — 1160F RVW MEDS BY RX/DR IN RCRD: CPT | Mod: CPTII,,, | Performed by: NURSE PRACTITIONER

## 2024-03-05 PROCEDURE — 3079F DIAST BP 80-89 MM HG: CPT | Mod: CPTII,,, | Performed by: NURSE PRACTITIONER

## 2024-03-05 PROCEDURE — 3008F BODY MASS INDEX DOCD: CPT | Mod: CPTII,,, | Performed by: NURSE PRACTITIONER

## 2024-03-05 PROCEDURE — 4010F ACE/ARB THERAPY RXD/TAKEN: CPT | Mod: CPTII,,, | Performed by: NURSE PRACTITIONER

## 2024-03-05 PROCEDURE — 3074F SYST BP LT 130 MM HG: CPT | Mod: CPTII,,, | Performed by: NURSE PRACTITIONER

## 2024-03-05 PROCEDURE — 99213 OFFICE O/P EST LOW 20 MIN: CPT | Mod: ,,, | Performed by: NURSE PRACTITIONER

## 2024-03-05 RX ORDER — LISINOPRIL 2.5 MG/1
2.5 TABLET ORAL 2 TIMES DAILY
Qty: 180 TABLET | Refills: 1 | Status: SHIPPED | OUTPATIENT
Start: 2024-03-05 | End: 2024-04-24

## 2024-03-05 RX ORDER — ERGOCALCIFEROL 1.25 MG/1
CAPSULE ORAL
Qty: 24 CAPSULE | Refills: 1 | Status: SHIPPED | OUTPATIENT
Start: 2024-03-05

## 2024-03-05 RX ORDER — LISINOPRIL 5 MG/1
TABLET ORAL
Qty: 90 TABLET | Refills: 1 | Status: SHIPPED | OUTPATIENT
Start: 2024-03-05 | End: 2024-04-24

## 2024-03-05 NOTE — PROGRESS NOTES
Zahida Blanchard NP   [place] [id number]  [place]  [address]      PATIENT NAME: Mally Woods  : 1965  DATE: 3/5/24  MRN: [id number]      Billing Provider: Zahida Blanchard NP  Level of Service: [place] OFFICE/OUTPT VISIT, EST, LEVL III, 20-29 MIN  Patient PCP Information       Provider PCP Type    Zahida Blanchard NP General            Reason for Visit / Chief Complaint: Hypertension (Here for check up and med refill.) and Health Maintenance (Hepatitis C Screening Never done---declined/COVID-19 Vaccine(1) Never done---declined/Pneumococcal Vaccines (Age 0-64)(1 of 2 - PCV) Never done---declined/HIV Screening Never done---declined/TETANUS VACCINE Never done---declined/Shingles Vaccine(1 of 2) Never done---declined/Mammogram due on 2024 )         History of Present Illness / Problem Focused Workflow     Patient is a 58 y.o. female who presents for check up and medication refill on HTN and Vitamin D deficiency.   Hypertension: Here for check up and med refill.          Review of Systems     @Review of Systems   Constitutional:  Negative for activity change, appetite change, fatigue and fever.   HENT:  Negative for nasal congestion, ear pain, rhinorrhea, sinus pressure/congestion and sore throat.    Eyes:  Negative for pain, redness, visual disturbance and eye dryness.   Respiratory:  Negative for cough and shortness of breath.    Cardiovascular:  Negative for chest pain and leg swelling.   Gastrointestinal:  Negative for abdominal distention, abdominal pain, constipation and diarrhea.   Endocrine: Negative for cold intolerance, heat intolerance and polyuria.   Genitourinary:  Negative for bladder incontinence, dysuria, frequency and urgency.   Musculoskeletal:  Negative for arthralgias, gait problem and myalgias.   Integumentary:  Negative for color change, rash and wound.   Allergic/Immunologic: Negative for environmental allergies and food allergies.   Neurological:  Negative  for dizziness, weakness, light-headedness and headaches.   Psychiatric/Behavioral:  Negative for behavioral problems and sleep disturbance.        Medical / Social / Family History     Past Medical History:   Diagnosis Date    Acute superficial gastritis without hemorrhage 10/21/2021    Anxiety     Aortic aneurysm 11/04/2020    w/o rupture    Cervical disc disorder     Chest pain in adult 10/21/2021    Depression     GERD (gastroesophageal reflux disease)     History of transient ischemic attack (TIA) 11/04/2020    Hyperlipidemia     Hypertension     Stroke     Thoracic aortic aneurysm     Unspecified osteoarthritis, unspecified site     Vitamin deficiency        Past Surgical History:   Procedure Laterality Date    CARDIAC CATHETERIZATION Left 07/31/2008    Performed by Dr. Bruce Cramer    CARPAL TUNNEL RELEASE  12/21/2017    Dr. Moran with cyst removed from left middle finger    CHOLECYSTECTOMY      COLONOSCOPY      DIAGNOSTIC LAPAROSCOPY      DILATION AND CURETTAGE OF UTERUS      EGD, WITH BALLOON DILATION  2018    Performed by Dr. Marc Lomax    LEFT HEART CATHETERIZATION  05/25/2023    TOTAL REDUCTION MAMMOPLASTY         Medications and Allergies     Medications  Outpatient Medications Marked as Taking for the 3/5/24 encounter (Office Visit) with Zahida Blanchard NP   Medication Sig Dispense Refill    baclofen (LIORESAL) 10 MG tablet TAKE ONE TABLET BY MOUTH THREE TIMES DAILY 90 tablet 1    cyanocobalamin (VITAMIN B-12) 1000 MCG tablet 1 tablet.      mecobalamin, vitamin B12, 1,000 mcg TbDL DISSOLVE 1 TABLET on the tongue ONCE DAILY 30 tablet 0    vit c-ascorbate Ca-ascorb sod (VITAMIN C) 500 mg/15 mL Liqd as directed      [DISCONTINUED] ergocalciferol (ERGOCALCIFEROL) 50,000 unit Cap TAKE 1 CAPSULE BY MOUTH TWICE WEEKLY AS DIRECTED 14 capsule 0    [DISCONTINUED] lisinopriL (PRINIVIL,ZESTRIL) 2.5 MG tablet Take 1 tablet (2.5 mg total) by mouth 2 (two) times daily. 60 tablet 0    [DISCONTINUED]  lisinopriL (PRINIVIL,ZESTRIL) 5 MG tablet TAKE ONE TABLET BY MOUTH ONCE DAILY FOR high blood PRESSURE 90 tablet 1       Allergies  Review of patient's allergies indicates:   Allergen Reactions    Penicillins Hives and Rash     Can take rocephin  Other reaction(s): > 10 years ago  Can take rocephin      Celecoxib     Ciprofloxacin      States she can not take them due to aneurism     Fentanyl Other (See Comments)     Extreme sedation  Other reaction(s): Other (See Comments)  Extreme sedation  Extreme sedation      Hydrochlorothiazide Other (See Comments)     Reaction unknown  Other reaction(s): Other (See Comments)  Reaction unknown  Reaction unknown    Promethazine hcl Other (See Comments)     Hallucinations    Betamethasone Other (See Comments) and Rash     Flushing  Flushing  Flushing    Hydromorphone hcl Palpitations    Latex Rash    Morphine Palpitations    Sulfa (sulfonamide antibiotics) Itching       Physical Examination     Vitals:    03/05/24 0851   BP: 128/86   Pulse: (!) 58   Resp: 18   Temp: 97.9 °F (36.6 °C)     Physical Exam  Vitals and nursing note reviewed.   Constitutional:       Appearance: She is obese.   HENT:      Head: Normocephalic.      Right Ear: Tympanic membrane normal.      Left Ear: Tympanic membrane normal.      Nose: Nose normal.      Mouth/Throat:      Mouth: Mucous membranes are moist.      Pharynx: Oropharynx is clear. No posterior oropharyngeal erythema.   Eyes:      Conjunctiva/sclera: Conjunctivae normal.   Cardiovascular:      Rate and Rhythm: Normal rate and regular rhythm.      Pulses: Normal pulses.      Heart sounds: Normal heart sounds.   Pulmonary:      Effort: Pulmonary effort is normal.      Breath sounds: Normal breath sounds.   Abdominal:      General: Abdomen is flat. Bowel sounds are normal. There is no distension.      Palpations: Abdomen is soft.   Musculoskeletal:         General: No swelling or tenderness. Normal range of motion.      Cervical back: Normal range  of motion.      Right lower leg: No edema.      Left lower leg: No edema.   Skin:     General: Skin is warm and dry.      Capillary Refill: Capillary refill takes less than 2 seconds.   Neurological:      Mental Status: She is alert. Mental status is at baseline.   Psychiatric:         Mood and Affect: Mood normal.         Behavior: Behavior normal.               Lab Results   Component Value Date    WBC 7.65 02/20/2024    HGB 13.3 02/20/2024    HCT 38.7 02/20/2024    MCV 89.2 02/20/2024     02/20/2024        CMP  Sodium   Date Value Ref Range Status   02/20/2024 142 136 - 145 mmol/L Final     Potassium   Date Value Ref Range Status   02/20/2024 3.9 3.5 - 5.1 mmol/L Final     Chloride   Date Value Ref Range Status   02/20/2024 107 98 - 107 mmol/L Final     CO2   Date Value Ref Range Status   02/20/2024 29 21 - 32 mmol/L Final     Glucose   Date Value Ref Range Status   02/20/2024 81 74 - 106 mg/dL Final     BUN   Date Value Ref Range Status   02/20/2024 26 (H) 7 - 18 mg/dL Final     Creatinine   Date Value Ref Range Status   02/20/2024 0.83 0.55 - 1.02 mg/dL Final     Calcium   Date Value Ref Range Status   02/20/2024 9.5 8.5 - 10.1 mg/dL Final     Total Protein   Date Value Ref Range Status   02/20/2024 7.5 6.4 - 8.2 g/dL Final     Albumin   Date Value Ref Range Status   02/20/2024 3.9 3.5 - 5.0 g/dL Final     Bilirubin, Total   Date Value Ref Range Status   02/20/2024 0.3 >0.0 - 1.2 mg/dL Final     Alk Phos   Date Value Ref Range Status   02/20/2024 110 46 - 118 U/L Final     AST   Date Value Ref Range Status   02/20/2024 22 15 - 37 U/L Final     ALT   Date Value Ref Range Status   02/20/2024 30 13 - 56 U/L Final     Anion Gap   Date Value Ref Range Status   02/20/2024 10 7 - 16 mmol/L Final     eGFR   Date Value Ref Range Status   02/20/2024 81 >=60 mL/min/1.73m2 Final     Procedures   Assessment and Plan (including Health Maintenance)   :    Plan:     Problem List Items Addressed This Visit           Cardiac/Vascular    Hypertension    Current Assessment & Plan     Blood pressure well controlled. Continue current medications. Follow up in 3 months or as needed.            Relevant Medications    lisinopriL (PRINIVIL,ZESTRIL) 2.5 MG tablet    lisinopriL (PRINIVIL,ZESTRIL) 5 MG tablet       Endocrine    Vitamin D deficiency    Current Assessment & Plan     Vitamin D level obtained today. Continue ergocalciferol. She admits she has not been taking ergocalciferol as ordered. Will follow up with results. Follow up in 3 months or as needed.          Relevant Medications    ergocalciferol (ERGOCALCIFEROL) 50,000 unit Cap    Other Relevant Orders    Vitamin D     Other Visit Diagnoses       Hyperlipidemia, unspecified hyperlipidemia type    -  Primary    Relevant Orders    Lipid Panel            Health Maintenance Topics with due status: Not Due       Topic Last Completion Date    Colorectal Cancer Screening 12/14/2015    Hemoglobin A1c (Diabetic Prevention Screening) 12/22/2022    Cervical Cancer Screening 02/23/2023    Lipid Panel 03/08/2023       Future Appointments   Date Time Provider Department Center   3/7/2024  8:30 AM [facility]   [facility]2024  [facility]   9/4/2024  2:00 PM Zahida Blanchard NP [facility]        Health Maintenance Due   Topic Date Due    Hepatitis C Screening  Never done    COVID-19 Vaccine (1) Never done    Pneumococcal Vaccines (Age 0-64) (1 of 2 - PCV) Never done    HIV Screening  Never done    TETANUS VACCINE  Never done    Shingles Vaccine (1 of 2) Never done    Mammogram  05/04/2024          Signature:  Zahida Blanchard NP  [address]  [address]    Date of encounter: 3/5/24

## 2024-03-05 NOTE — ASSESSMENT & PLAN NOTE
Vitamin D level obtained today. Continue ergocalciferol. She admits she has not been taking ergocalciferol as ordered. Will follow up with results. Follow up in 3 months or as needed.

## 2024-03-06 LAB
25(OH)D3 SERPL-MCNC: 37.2 NG/ML
CHOLEST SERPL-MCNC: 213 MG/DL (ref 0–200)
CHOLEST/HDLC SERPL: 3.9 {RATIO}
HDLC SERPL-MCNC: 55 MG/DL (ref 40–60)
LDLC SERPL CALC-MCNC: 144 MG/DL
LDLC/HDLC SERPL: 2.6 {RATIO}
NONHDLC SERPL-MCNC: 158 MG/DL
TRIGL SERPL-MCNC: 68 MG/DL (ref 35–150)
VLDLC SERPL-MCNC: 14 MG/DL

## 2024-03-06 NOTE — PROGRESS NOTES
Labs reviewed: Vitamin D level was on low end of normal (37.2). She has not been taking her supplement as ordered. Encourage her to take twice weekly as ordered. Total cholesterol and LDL still slightly elevated- she avoids statins due to her liver. Encourage low fat/low cholesterol diet with continued exercise and weight loss.

## 2024-03-07 ENCOUNTER — HOSPITAL ENCOUNTER (OUTPATIENT)
Dept: RADIOLOGY | Facility: HOSPITAL | Age: 59
Discharge: HOME | End: 2024-03-07
Attending: NURSE PRACTITIONER
Payer: COMMERCIAL

## 2024-03-07 DIAGNOSIS — K76.0 FATTY LIVER: ICD-10-CM

## 2024-03-07 PROCEDURE — 76705 ECHO EXAM OF ABDOMEN: CPT | Mod: 26,,, | Performed by: RADIOLOGY

## 2024-03-07 PROCEDURE — 76705 ECHO EXAM OF ABDOMEN: CPT | Mod: TC

## 2024-03-25 ENCOUNTER — OFFICE VISIT (OUTPATIENT)
Dept: FAMILY MEDICINE | Facility: CLINIC | Age: 59
End: 2024-03-25
Payer: COMMERCIAL

## 2024-03-25 VITALS
OXYGEN SATURATION: 98 % | TEMPERATURE: 98 F | WEIGHT: 233.38 LBS | SYSTOLIC BLOOD PRESSURE: 119 MMHG | DIASTOLIC BLOOD PRESSURE: 77 MMHG | HEIGHT: 65 IN | HEART RATE: 61 BPM | RESPIRATION RATE: 18 BRPM | BODY MASS INDEX: 38.88 KG/M2

## 2024-03-25 DIAGNOSIS — R59.0 LAD (LYMPHADENOPATHY), OCCIPITAL: Primary | ICD-10-CM

## 2024-03-25 PROCEDURE — 1159F MED LIST DOCD IN RCRD: CPT | Mod: CPTII,,, | Performed by: NURSE PRACTITIONER

## 2024-03-25 PROCEDURE — 3078F DIAST BP <80 MM HG: CPT | Mod: CPTII,,, | Performed by: NURSE PRACTITIONER

## 2024-03-25 PROCEDURE — 99212 OFFICE O/P EST SF 10 MIN: CPT | Mod: ,,, | Performed by: NURSE PRACTITIONER

## 2024-03-25 PROCEDURE — 3008F BODY MASS INDEX DOCD: CPT | Mod: CPTII,,, | Performed by: NURSE PRACTITIONER

## 2024-03-25 PROCEDURE — 4010F ACE/ARB THERAPY RXD/TAKEN: CPT | Mod: CPTII,,, | Performed by: NURSE PRACTITIONER

## 2024-03-25 PROCEDURE — 1160F RVW MEDS BY RX/DR IN RCRD: CPT | Mod: CPTII,,, | Performed by: NURSE PRACTITIONER

## 2024-03-25 PROCEDURE — 3074F SYST BP LT 130 MM HG: CPT | Mod: CPTII,,, | Performed by: NURSE PRACTITIONER

## 2024-03-25 RX ORDER — HYDROCORTISONE 25 MG/ML
2.5 LOTION TOPICAL DAILY
COMMUNITY
Start: 2024-02-22 | End: 2024-04-24 | Stop reason: ALTCHOICE

## 2024-03-25 NOTE — PROGRESS NOTES
Zahida Blanchard NP   [place] [address]  [place]  [address]      PATIENT NAME: Mally Woods  : 1965  DATE: 3/25/24  MRN: [id number]      Billing Provider: Zahida Blanchard NP  Level of Service: [place] OFFICE/OUTPT VISIT, EST, LEVL II, 10-19 MIN  Patient PCP Information       Provider PCP Type    Zahida Blanchard NP General            Reason for Visit / Chief Complaint: Neck Pain (Patient is a 59 y/o female that c/o a knot on the back/right side of her neck that is swollen. She states it came up  x 2-3 days ago. She states the spot is sore. She denies any injury to the area. She states today the swelling has gone down some.)         History of Present Illness / Problem Focused Workflow     58 year old female presents to clinic with c/o a knot on the back/right side of her neck that is swollen. She states it came up  x 2-3 days ago. She states the spot is tender. She denies any injury to the area. She states today the swelling has gone down some.            Review of Systems     @Review of Systems   Constitutional:  Negative for activity change, appetite change, fatigue and fever.   HENT:  Negative for nasal congestion, ear pain, rhinorrhea, sinus pressure/congestion and sore throat.    Eyes:  Negative for pain, redness, visual disturbance and eye dryness.   Respiratory:  Negative for cough and shortness of breath.    Cardiovascular:  Negative for chest pain and leg swelling.   Gastrointestinal:  Negative for abdominal distention, abdominal pain, constipation and diarrhea.   Endocrine: Negative for cold intolerance, heat intolerance and polyuria.   Genitourinary:  Negative for bladder incontinence, dysuria, frequency and urgency.   Musculoskeletal:  Negative for arthralgias, gait problem and myalgias.   Integumentary:  Negative for color change, rash and wound.        Knot to back of right neck swollen and tender   Allergic/Immunologic: Negative for environmental allergies and food  allergies.   Neurological:  Negative for dizziness, weakness, light-headedness and headaches.   Psychiatric/Behavioral:  Negative for behavioral problems and sleep disturbance.        Medical / Social / Family History     Past Medical History:   Diagnosis Date    Acute superficial gastritis without hemorrhage 10/21/2021    Anxiety     Aortic aneurysm 11/04/2020    w/o rupture    Cervical disc disorder     Chest pain in adult 10/21/2021    Depression     GERD (gastroesophageal reflux disease)     History of transient ischemic attack (TIA) 11/04/2020    Hyperlipidemia     Hypertension     Stroke     Thoracic aortic aneurysm     Unspecified osteoarthritis, unspecified site     Vitamin deficiency        Past Surgical History:   Procedure Laterality Date    CARDIAC CATHETERIZATION Left 07/31/2008    Performed by Dr. Bruce Cramer    CARPAL TUNNEL RELEASE  12/21/2017    Dr. Moran with cyst removed from left middle finger    CHOLECYSTECTOMY      COLONOSCOPY      DIAGNOSTIC LAPAROSCOPY      DILATION AND CURETTAGE OF UTERUS      EGD, WITH BALLOON DILATION  2018    Performed by Dr. Marc Lomax    LEFT HEART CATHETERIZATION  05/25/2023    TOTAL REDUCTION MAMMOPLASTY         Medications and Allergies     Medications  Outpatient Medications Marked as Taking for the 3/25/24 encounter (Office Visit) with Zahida Blanchard, NP   Medication Sig Dispense Refill    cyanocobalamin (VITAMIN B-12) 1000 MCG tablet 1 tablet.      ergocalciferol (ERGOCALCIFEROL) 50,000 unit Cap TAKE 1 CAPSULE BY MOUTH TWICE WEEKLY AS DIRECTED 24 capsule 1    hydrocortisone 2.5 % lotion Apply 2.5 each topically once daily.      lisinopriL (PRINIVIL,ZESTRIL) 2.5 MG tablet Take 1 tablet (2.5 mg total) by mouth 2 (two) times daily. 180 tablet 1    lisinopriL (PRINIVIL,ZESTRIL) 5 MG tablet TAKE ONE TABLET BY MOUTH ONCE DAILY FOR high blood PRESSURE 90 tablet 1    vit c-ascorbate Ca-ascorb sod (VITAMIN C) 500 mg/15 mL Liqd as directed          Allergies  Review of patient's allergies indicates:   Allergen Reactions    Penicillins Hives and Rash     Can take rocephin  Other reaction(s): > 10 years ago  Can take rocephin      Celecoxib     Ciprofloxacin      States she can not take them due to aneurism     Fentanyl Other (See Comments)     Extreme sedation  Other reaction(s): Other (See Comments)  Extreme sedation  Extreme sedation      Hydrochlorothiazide Other (See Comments)     Reaction unknown  Other reaction(s): Other (See Comments)  Reaction unknown  Reaction unknown    Promethazine hcl Other (See Comments)     Hallucinations    Betamethasone Other (See Comments) and Rash     Flushing  Flushing  Flushing    Hydromorphone hcl Palpitations    Latex Rash    Morphine Palpitations    Sulfa (sulfonamide antibiotics) Itching       Physical Examination     Vitals:    03/25/24 0830   BP: 119/77   Pulse: 61   Resp: 18   Temp: 98.1 °F (36.7 °C)     Physical Exam  Vitals and nursing note reviewed.   Constitutional:       Appearance: She is obese.   HENT:      Head: Normocephalic.      Right Ear: Tympanic membrane normal.      Left Ear: Tympanic membrane normal.      Nose: Nose normal.      Mouth/Throat:      Mouth: Mucous membranes are moist.      Pharynx: Oropharynx is clear. No posterior oropharyngeal erythema.   Eyes:      Conjunctiva/sclera: Conjunctivae normal.   Neck:        Comments: Occipital node slightly enlarged and tender   Cardiovascular:      Rate and Rhythm: Normal rate and regular rhythm.      Pulses: Normal pulses.      Heart sounds: Normal heart sounds.   Pulmonary:      Effort: Pulmonary effort is normal.      Breath sounds: Normal breath sounds.   Abdominal:      General: Abdomen is flat. Bowel sounds are normal. There is no distension.      Palpations: Abdomen is soft.   Musculoskeletal:         General: No swelling or tenderness. Normal range of motion.      Cervical back: Normal range of motion.      Right lower leg: No edema.       Left lower leg: No edema.   Skin:     General: Skin is warm and dry.      Capillary Refill: Capillary refill takes less than 2 seconds.   Neurological:      Mental Status: She is alert. Mental status is at baseline.   Psychiatric:         Mood and Affect: Mood normal.         Behavior: Behavior normal.               Lab Results   Component Value Date    WBC 7.65 02/20/2024    HGB 13.3 02/20/2024    HCT 38.7 02/20/2024    MCV 89.2 02/20/2024     02/20/2024        CMP  Sodium   Date Value Ref Range Status   02/20/2024 142 136 - 145 mmol/L Final     Potassium   Date Value Ref Range Status   02/20/2024 3.9 3.5 - 5.1 mmol/L Final     Chloride   Date Value Ref Range Status   02/20/2024 107 98 - 107 mmol/L Final     CO2   Date Value Ref Range Status   02/20/2024 29 21 - 32 mmol/L Final     Glucose   Date Value Ref Range Status   02/20/2024 81 74 - 106 mg/dL Final     BUN   Date Value Ref Range Status   02/20/2024 26 (H) 7 - 18 mg/dL Final     Creatinine   Date Value Ref Range Status   02/20/2024 0.83 0.55 - 1.02 mg/dL Final     Calcium   Date Value Ref Range Status   02/20/2024 9.5 8.5 - 10.1 mg/dL Final     Total Protein   Date Value Ref Range Status   02/20/2024 7.5 6.4 - 8.2 g/dL Final     Albumin   Date Value Ref Range Status   02/20/2024 3.9 3.5 - 5.0 g/dL Final     Bilirubin, Total   Date Value Ref Range Status   02/20/2024 0.3 >0.0 - 1.2 mg/dL Final     Alk Phos   Date Value Ref Range Status   02/20/2024 110 46 - 118 U/L Final     AST   Date Value Ref Range Status   02/20/2024 22 15 - 37 U/L Final     ALT   Date Value Ref Range Status   02/20/2024 30 13 - 56 U/L Final     Anion Gap   Date Value Ref Range Status   02/20/2024 10 7 - 16 mmol/L Final     eGFR   Date Value Ref Range Status   02/20/2024 81 >=60 mL/min/1.73m2 Final     Procedures   Assessment and Plan (including Health Maintenance)   :    Plan:     Problem List Items Addressed This Visit          Other    LAD (lymphadenopathy), occipital - Primary     Current Assessment & Plan     Right occipital node swollen and tender. No s/sx of any infection to scalp, ear, mouth, or other areas. Continue to monitor. Instructed if symptoms persist or if new symptoms develop follow up in clinic.             Health Maintenance Topics with due status: Not Due       Topic Last Completion Date    Colorectal Cancer Screening 12/14/2015    Hemoglobin A1c (Diabetic Prevention Screening) 12/22/2022    Cervical Cancer Screening 02/23/2023    Lipid Panel 03/05/2024       Future Appointments   Date Time Provider Department Center   [facility]17[facility]  [facility]   9/4/2024  2:00 PM Zahida Blanchard NP [facility]        Health Maintenance Due   Topic Date Due    Hepatitis C Screening  Never done    COVID-19 Vaccine (1) Never done    Pneumococcal Vaccines (Age 0-64) (1 of 2 - PCV) Never done    HIV Screening  Never done    TETANUS VACCINE  Never done    Shingles Vaccine (1 of 2) Never done    Mammogram  05/04/2024          Signature:  Zahida Blanchard NP  [address]  [address]    Date of encounter: 3/25/24

## 2024-03-25 NOTE — PROGRESS NOTES
Health Maintenance Due   Topic Date Due    Hepatitis C Screening  Never done    COVID-19 Vaccine (1) Never done    Pneumococcal Vaccines (Age 0-64) (1 of 2 - PCV) Never done    HIV Screening  Never done    TETANUS VACCINE  Never done    Shingles Vaccine (1 of 2) Never done    Mammogram  05/04/2024     I discussed care gaps with patient and she declined at this time.

## 2024-03-25 NOTE — LETTER
March 25, 2024      [facility]  [address] [place] [address] [place] [address]  Phone: [phone]  Fax: [phone]       Patient: Mally Woods   YOB: 1965  Date of Visit: 03/25/2024    To Whom It May Concern:    Moises Woods  was at [facility] on 03/25/2024. The patient may return to work/school on 3/25/24 with no restrictions. If you have any questions or concerns, or if I can be of further assistance, please do not hesitate to contact me.    Sincerely,    Zahida Blanchard NP

## 2024-03-25 NOTE — ASSESSMENT & PLAN NOTE
Right occipital node swollen and tender. No s/sx of any infection to scalp, ear, mouth, or other areas. Continue to monitor. Instructed if symptoms persist or if new symptoms develop follow up in clinic.

## 2024-03-28 ENCOUNTER — DOCUMENTATION ONLY (OUTPATIENT)
Dept: FAMILY MEDICINE | Facility: CLINIC | Age: 59
End: 2024-03-28
Payer: COMMERCIAL

## 2024-03-28 RX ORDER — MUPIROCIN 20 MG/G
OINTMENT TOPICAL 3 TIMES DAILY
Qty: 30 G | Refills: 1 | Status: SHIPPED | OUTPATIENT
Start: 2024-03-28 | End: 2024-04-24

## 2024-03-28 RX ORDER — DOXYCYCLINE 100 MG/1
100 CAPSULE ORAL 2 TIMES DAILY
Qty: 20 CAPSULE | Refills: 0 | Status: SHIPPED | OUTPATIENT
Start: 2024-03-28 | End: 2024-04-24

## 2024-03-28 RX ORDER — CEFTRIAXONE 1 G/1
1 INJECTION, POWDER, FOR SOLUTION INTRAMUSCULAR; INTRAVENOUS
Status: COMPLETED | OUTPATIENT
Start: 2024-03-28 | End: 2024-03-28

## 2024-03-28 NOTE — PROGRESS NOTES
Patient contacted clinic with complaints of increased swelling to right occiptal node as well as redness, itching, and tenderness to right side of scalp.   She was instructed to come back in to clinic and scalp was assessed noting redness, small papular rash and tender and swollen occipital node. We will treat as folliculitis. Follow up if symptoms persist or worsen.

## 2024-04-08 ENCOUNTER — HOSPITAL ENCOUNTER (EMERGENCY)
Facility: HOSPITAL | Age: 59
Discharge: HOME | End: 2024-04-09
Attending: EMERGENCY MEDICINE
Payer: COMMERCIAL

## 2024-04-08 DIAGNOSIS — R53.83 FATIGUE, UNSPECIFIED TYPE: Primary | ICD-10-CM

## 2024-04-08 DIAGNOSIS — R00.2 PALPITATION: ICD-10-CM

## 2024-04-08 LAB
ALBUMIN SERPL BCP-MCNC: 3.7 G/DL (ref 3.5–5)
ALBUMIN/GLOB SERPL: 1.1 {RATIO}
ALP SERPL-CCNC: 117 U/L (ref 46–118)
ALT SERPL W P-5'-P-CCNC: 53 U/L (ref 13–56)
ANION GAP SERPL CALCULATED.3IONS-SCNC: 10 MMOL/L (ref 7–16)
AST SERPL W P-5'-P-CCNC: 35 U/L (ref 15–37)
BASOPHILS # BLD AUTO: 0.04 K/UL (ref 0–0.2)
BASOPHILS NFR BLD AUTO: 0.4 % (ref 0–1)
BILIRUB SERPL-MCNC: 0.2 MG/DL (ref ?–1.2)
BILIRUB UR QL STRIP: NEGATIVE
BUN SERPL-MCNC: 28 MG/DL (ref 7–18)
BUN/CREAT SERPL: 33 (ref 6–20)
CALCIUM SERPL-MCNC: 9.6 MG/DL (ref 8.5–10.1)
CHLORIDE SERPL-SCNC: 108 MMOL/L (ref 98–107)
CLARITY UR: CLEAR
CO2 SERPL-SCNC: 28 MMOL/L (ref 21–32)
COLOR UR: ABNORMAL
CREAT SERPL-MCNC: 0.85 MG/DL (ref 0.55–1.02)
DIFFERENTIAL METHOD BLD: ABNORMAL
EGFR (NO RACE VARIABLE) (RUSH/TITUS): 79 ML/MIN/1.73M2
EOSINOPHIL # BLD AUTO: 0.12 K/UL (ref 0–0.5)
EOSINOPHIL NFR BLD AUTO: 1.2 % (ref 1–4)
ERYTHROCYTE [DISTWIDTH] IN BLOOD BY AUTOMATED COUNT: 12.4 % (ref 11.5–14.5)
GLOBULIN SER-MCNC: 3.3 G/DL (ref 2–4)
GLUCOSE SERPL-MCNC: 115 MG/DL (ref 74–106)
GLUCOSE UR STRIP-MCNC: NORMAL MG/DL
HCT VFR BLD AUTO: 37.6 % (ref 38–47)
HGB BLD-MCNC: 12.4 G/DL (ref 12–16)
IMM GRANULOCYTES # BLD AUTO: 0.05 K/UL (ref 0–0.04)
IMM GRANULOCYTES NFR BLD: 0.5 % (ref 0–0.4)
KETONES UR STRIP-SCNC: NEGATIVE MG/DL
LEUKOCYTE ESTERASE UR QL STRIP: NEGATIVE
LYMPHOCYTES # BLD AUTO: 1.17 K/UL (ref 1–4.8)
LYMPHOCYTES NFR BLD AUTO: 11.4 % (ref 27–41)
MCH RBC QN AUTO: 30.2 PG (ref 27–31)
MCHC RBC AUTO-ENTMCNC: 33 G/DL (ref 32–36)
MCV RBC AUTO: 91.5 FL (ref 80–96)
MONOCYTES # BLD AUTO: 0.61 K/UL (ref 0–0.8)
MONOCYTES NFR BLD AUTO: 5.9 % (ref 2–6)
MPC BLD CALC-MCNC: 11.1 FL (ref 9.4–12.4)
NEUTROPHILS # BLD AUTO: 8.31 K/UL (ref 1.8–7.7)
NEUTROPHILS NFR BLD AUTO: 80.6 % (ref 53–65)
NITRITE UR QL STRIP: NEGATIVE
NRBC # BLD AUTO: 0 X10E3/UL
NRBC, AUTO (.00): 0 %
PH UR STRIP: 5 PH UNITS
PLATELET # BLD AUTO: 255 K/UL (ref 150–400)
POTASSIUM SERPL-SCNC: 3.7 MMOL/L (ref 3.5–5.1)
PROT SERPL-MCNC: 7 G/DL (ref 6.4–8.2)
PROT UR QL STRIP: 10
RBC # BLD AUTO: 4.11 M/UL (ref 4.2–5.4)
RBC # UR STRIP: NEGATIVE /UL
SODIUM SERPL-SCNC: 142 MMOL/L (ref 136–145)
SP GR UR STRIP: 1.02
TROPONIN I SERPL DL<=0.01 NG/ML-MCNC: 5.2 PG/ML
UROBILINOGEN UR STRIP-ACNC: NORMAL MG/DL
WBC # BLD AUTO: 10.3 K/UL (ref 4.5–11)

## 2024-04-08 PROCEDURE — 99284 EMERGENCY DEPT VISIT MOD MDM: CPT | Mod: ,,, | Performed by: EMERGENCY MEDICINE

## 2024-04-08 PROCEDURE — 93010 ELECTROCARDIOGRAM REPORT: CPT | Mod: ,,, | Performed by: INTERNAL MEDICINE

## 2024-04-08 PROCEDURE — 85025 COMPLETE CBC W/AUTO DIFF WBC: CPT | Performed by: EMERGENCY MEDICINE

## 2024-04-08 PROCEDURE — 80053 COMPREHEN METABOLIC PANEL: CPT | Performed by: EMERGENCY MEDICINE

## 2024-04-08 PROCEDURE — 81003 URINALYSIS AUTO W/O SCOPE: CPT | Performed by: EMERGENCY MEDICINE

## 2024-04-08 PROCEDURE — 84484 ASSAY OF TROPONIN QUANT: CPT | Performed by: EMERGENCY MEDICINE

## 2024-04-08 PROCEDURE — 99285 EMERGENCY DEPT VISIT HI MDM: CPT | Mod: 25

## 2024-04-08 PROCEDURE — 93005 ELECTROCARDIOGRAM TRACING: CPT

## 2024-04-08 NOTE — Clinical Note
"Mally"Martha Woods was seen and treated in our emergency department on 4/8/2024.  She may return to work on 04/11/2024.       If you have any questions or concerns, please don't hesitate to call.      JOSEPHINE Robles RN    "

## 2024-04-09 VITALS
OXYGEN SATURATION: 99 % | HEART RATE: 63 BPM | TEMPERATURE: 98 F | RESPIRATION RATE: 12 BRPM | BODY MASS INDEX: 41.6 KG/M2 | WEIGHT: 250 LBS | SYSTOLIC BLOOD PRESSURE: 105 MMHG | DIASTOLIC BLOOD PRESSURE: 52 MMHG

## 2024-04-09 LAB — TROPONIN I SERPL DL<=0.01 NG/ML-MCNC: 5.5 PG/ML

## 2024-04-09 NOTE — ED PROVIDER NOTES
Encounter Date: 4/8/2024    SCRIBE #1 NOTE: I, Pari Ambrose, am scribing for, and in the presence of,  Zuhair Pierre MD. I have scribed the entire note.       History     Chief Complaint   Patient presents with    Fatigue     The pt is a 57 y/o female coming into the ED with complaints of fatigue. The pt states that she was laying down at home and had a HA and CP. She states she was overall feeling unwell. She states the CP is usual due to costochondritis. The pt states she then got up to go to the kitchen, got lightheaded, pale, and diaphoretic. The pt states she took her blood pressure and found it was 84/40. The pt states she has a Hx of HTN. She also states she has an aortic aneurysm that is being monitored and is currently stable. The pt also mentions these symptoms have happened before when she got the flu. She states she was severely dehydrated. There are no other complaints at this time.     The history is provided by the patient. No  was used.     Review of patient's allergies indicates:   Allergen Reactions    Penicillins Hives and Rash     Can take rocephin  Other reaction(s): > 10 years ago  Can take rocephin      Celecoxib     Ciprofloxacin      States she can not take them due to aneurism     Fentanyl Other (See Comments)     Extreme sedation  Other reaction(s): Other (See Comments)  Extreme sedation  Extreme sedation      Hydrochlorothiazide Other (See Comments)     Reaction unknown  Other reaction(s): Other (See Comments)  Reaction unknown  Reaction unknown    Promethazine hcl Other (See Comments)     Hallucinations    Betamethasone Other (See Comments) and Rash     Flushing  Flushing  Flushing    Hydromorphone hcl Palpitations    Latex Rash    Morphine Palpitations    Sulfa (sulfonamide antibiotics) Itching     Past Medical History:   Diagnosis Date    Cervical disc disorder     Hiatal hernia with GERD     Hyperlipidemia     Hypertension     Morbid obesity with BMI of  40.0-44.9, adult     Nonalcoholic fatty liver disease     Stroke     Thoracic aortic aneurysm     Unspecified osteoarthritis, unspecified site     Vitamin D deficiency 12/22/2022     Past Surgical History:   Procedure Laterality Date    CARDIAC CATHETERIZATION Left 07/31/2008    Performed by Dr. Bruce Cramer    CARPAL TUNNEL RELEASE  12/21/2017    Dr. Moran with cyst removed from left middle finger    CHOLECYSTECTOMY      COLONOSCOPY      DIAGNOSTIC LAPAROSCOPY      DILATION AND CURETTAGE OF UTERUS      EGD, WITH BALLOON DILATION  2018    Performed by Dr. Marc Lomax    LEFT HEART CATHETERIZATION  05/25/2023    TOTAL REDUCTION MAMMOPLASTY       Family History   Problem Relation Age of Onset    Hyperlipidemia Mother     Hypertension Mother     Glaucoma Mother     Diabetes Mother     Cervical cancer Mother     Pancreatic cancer Mother     Hyperlipidemia Father     Hypertension Father     Dementia Father     No Known Problems Sister      Social History     Tobacco Use    Smoking status: Never     Passive exposure: Past    Smokeless tobacco: Never   Substance Use Topics    Alcohol use: Never    Drug use: Never     Review of Systems   Constitutional:  Positive for diaphoresis and fatigue.   Cardiovascular:  Positive for chest pain.   Neurological:  Positive for light-headedness and headaches.   All other systems reviewed and are negative.      Physical Exam     Initial Vitals   BP Pulse Resp Temp SpO2   04/08/24 2041 04/08/24 2040 04/08/24 2040 04/08/24 2040 04/08/24 2040   120/60 72 18 97.6 °F (36.4 °C) 97 %      MAP       --                Physical Exam    Nursing note and vitals reviewed.  Constitutional: She appears well-developed.   BMI of 41.6   HENT:   Head: Normocephalic and atraumatic.   Right Ear: External ear normal.   Left Ear: External ear normal.   Nose: Nose normal.   Mouth/Throat: Oropharynx is clear and moist.   Eyes: Conjunctivae and EOM are normal. Pupils are equal, round, and reactive to light.  No scleral icterus.   Neck: Neck supple. No JVD present.   Normal range of motion.  Cardiovascular:  Normal rate, regular rhythm, normal heart sounds and intact distal pulses.     Exam reveals no gallop and no friction rub.       No murmur heard.  Pulmonary/Chest: Breath sounds normal. No stridor. No respiratory distress. She has no wheezes. She exhibits no tenderness.   Abdominal: Abdomen is soft. Bowel sounds are normal. She exhibits no distension and no mass. There is no abdominal tenderness. There is no rebound and no guarding.   Musculoskeletal:         General: No tenderness or edema. Normal range of motion.      Cervical back: Normal range of motion and neck supple.      Comments: Back is nontender to palpation.      Neurological: She is alert and oriented to person, place, and time. She has normal strength. No cranial nerve deficit.   Skin: Skin is warm and dry. Capillary refill takes less than 2 seconds. No rash noted. No pallor.   Psychiatric: She has a normal mood and affect. Thought content normal.         ED Course   Procedures  Labs Reviewed   URINALYSIS, REFLEX TO URINE CULTURE - Abnormal; Notable for the following components:       Result Value    Protein, UA 10 (*)     All other components within normal limits   COMPREHENSIVE METABOLIC PANEL - Abnormal; Notable for the following components:    Chloride 108 (*)     Glucose 115 (*)     BUN 28 (*)     BUN/Creatinine Ratio 33 (*)     All other components within normal limits   CBC WITH DIFFERENTIAL - Abnormal; Notable for the following components:    RBC 4.11 (*)     Hematocrit 37.6 (*)     Neutrophils % 80.6 (*)     Lymphocytes % 11.4 (*)     Immature Granulocytes % 0.5 (*)     Neutrophils, Abs 8.31 (*)     Immature Granulocytes, Absolute 0.05 (*)     All other components within normal limits   TROPONIN I - Normal   TROPONIN I - Normal   CBC W/ AUTO DIFFERENTIAL    Narrative:     The following orders were created for panel order CBC auto  differential.  Procedure                               Abnormality         Status                     ---------                               -----------         ------                     CBC with Differential[[id number]]       Abnormal            Final result                 Please view results for these tests on the individual orders.     EKG Readings: (Independently Interpreted)   Heart Rate: 74.   Interpreted by Dr. Pierre:    Sinus Rhythm  Septal T wave abnormality is nonspecific        Imaging Results              X-Ray Chest AP Portable (Final result)  Result time 04/09/24 07:51:44      Final result by Pedro Angeles DO (04/09/24 07:51:44)                   Impression:      No acute cardiopulmonary process demonstrated.    Point of Service: [facility]      Electronically signed by: Pedro Angeles  Date:    04/09/2024  Time:    07:51               Narrative:    EXAMINATION:  XR CHEST AP PORTABLE    CLINICAL HISTORY:  Chest pain;    COMPARISON:  Chest x-ray May 18, 2023    TECHNIQUE:  Frontal view/views of the chest.    FINDINGS:  The cardiomediastinal silhouette is stable in configuration.  Chronic change of the lungs without focal consolidation, pleural effusion, or pneumothorax.  Visualized osseous and surrounding soft tissue structures appear grossly unchanged.                                       Medications - No data to display  Medical Decision Making  Amount and/or Complexity of Data Reviewed  Labs: ordered.  Radiology: ordered.              Attending Attestation:           Physician Attestation for Scribe:  Physician Attestation Statement for Scribe #1: I, Zuhair Pierre MD, reviewed documentation, as scribed by Pari Ambrose in my presence, and it is both accurate and complete.             ED Course as of 04/10/24 0645   Tue Apr 09, 2024   0136 MDM:  A 58-year-old female patient came to the emergency department complaining of generalized weakness and fatigue.  Differential  diagnosis: ACS, electrolyte imbalance, pneumonia, bronchitis [HK]   0136 All workup is normal.  I explained the findings to the patient we will discharge the patient home. [HK]      ED Course User Index  [HK] Zuhair Pierre MD                           Clinical Impression:  Final diagnoses:  [R00.2] Palpitation  [R53.83] Fatigue, unspecified type (Primary)          ED Disposition Condition    Discharge           ED Prescriptions    None       Follow-up Information       Follow up With Specialties Details Why Contact Info    Zahida Blanchard, NP Family Medicine In 1 week If symptoms worsen [id number] [address] [place] [address]  [phone]               Zuhair Pierre MD  04/10/24 0663

## 2024-04-10 LAB
OHS QRS DURATION: 106 MS
OHS QTC CALCULATION: 417 MS

## 2024-04-24 ENCOUNTER — OFFICE VISIT (OUTPATIENT)
Dept: FAMILY MEDICINE | Facility: CLINIC | Age: 59
End: 2024-04-24
Payer: COMMERCIAL

## 2024-04-24 VITALS
SYSTOLIC BLOOD PRESSURE: 123 MMHG | OXYGEN SATURATION: 98 % | DIASTOLIC BLOOD PRESSURE: 65 MMHG | WEIGHT: 229.63 LBS | HEART RATE: 69 BPM | RESPIRATION RATE: 18 BRPM | TEMPERATURE: 99 F | BODY MASS INDEX: 38.26 KG/M2 | HEIGHT: 65 IN

## 2024-04-24 DIAGNOSIS — I10 HYPERTENSION, UNSPECIFIED TYPE: ICD-10-CM

## 2024-04-24 DIAGNOSIS — K52.9 GASTROENTERITIS: Primary | ICD-10-CM

## 2024-04-24 LAB
BILIRUB SERPL-MCNC: NORMAL MG/DL
BLOOD URINE, POC: NORMAL
COLOR, POC UA: YELLOW
GLUCOSE UR QL STRIP: NORMAL
KETONES UR QL STRIP: NORMAL
LEUKOCYTE ESTERASE URINE, POC: NORMAL
NITRITE, POC UA: NORMAL
PH, POC UA: 5.5
PROTEIN, POC: NORMAL
SPECIFIC GRAVITY, POC UA: >=1.03
UROBILINOGEN, POC UA: 0.2

## 2024-04-24 PROCEDURE — 1159F MED LIST DOCD IN RCRD: CPT | Mod: CPTII,,, | Performed by: NURSE PRACTITIONER

## 2024-04-24 PROCEDURE — 99214 OFFICE O/P EST MOD 30 MIN: CPT | Mod: ,,, | Performed by: NURSE PRACTITIONER

## 2024-04-24 PROCEDURE — 3078F DIAST BP <80 MM HG: CPT | Mod: CPTII,,, | Performed by: NURSE PRACTITIONER

## 2024-04-24 PROCEDURE — 3074F SYST BP LT 130 MM HG: CPT | Mod: CPTII,,, | Performed by: NURSE PRACTITIONER

## 2024-04-24 PROCEDURE — 3008F BODY MASS INDEX DOCD: CPT | Mod: CPTII,,, | Performed by: NURSE PRACTITIONER

## 2024-04-24 PROCEDURE — 4010F ACE/ARB THERAPY RXD/TAKEN: CPT | Mod: CPTII,,, | Performed by: NURSE PRACTITIONER

## 2024-04-24 PROCEDURE — 1160F RVW MEDS BY RX/DR IN RCRD: CPT | Mod: CPTII,,, | Performed by: NURSE PRACTITIONER

## 2024-04-24 PROCEDURE — 81003 URINALYSIS AUTO W/O SCOPE: CPT | Mod: QW,,, | Performed by: NURSE PRACTITIONER

## 2024-04-24 NOTE — LETTER
April 24, 2024      [facility]  [address] [place] [address] [place] [address]  Phone: [phone]  Fax: [phone]       Patient: Mally Woods   YOB: 1965  Date of Visit: 04/24/2024    To Whom It May Concern:    Moises Woods  was at [facility] on 04/24/2024. The patient may return to work/school on 4/25/24 with no restrictions. If you have any questions or concerns, or if I can be of further assistance, please do not hesitate to contact me.    Sincerely,    Zahida Blanchard NP

## 2024-04-24 NOTE — ASSESSMENT & PLAN NOTE
Currently well controlled without meds. Continue to monitor at home and follow up with [facility] as scheduled.

## 2024-04-24 NOTE — PROGRESS NOTES
Zahida Blanchard NP   [facility]  [address]  [place]  [address]      PATIENT NAME: Mally Woods  : 1965  DATE: 24  MRN: [id number]      Billing Provider: Zahida Blanchard NP  Level of Service: OK OFFICE/OUTPT VISIT, EST, LEVL IV, 30-39 MIN  Patient PCP Information       Provider PCP Type    Zahida Blanchard NP General            Reason for Visit / Chief Complaint: Abdominal Pain (Mally Woods 58 year old female presents with lower abdominal pain and Pelvic pain. Began last night with some diarrhea. She had eaten some grilled foods from home yesterday. Urinalysis ordered.Patient declined  flu, covid and strep testing at this time.), Pelvic Pain (Pelvic pain in her ovaries after severe stomach cramps and some diarrhea.), and Loss of Consciousness (2024 patient fainted at home EMS was called and she was transported to [facility] in [place]. The doctor there diagnosed low BP and took her off Lisinopril. This happened 2 years before also.)         History of Present Illness / Problem Focused Workflow     58 year old female presents with lower abdominal pain, pelvic pain, and diarrhea which began last night. She states she had eaten some grilled foods from home yesterday and thinks maybe it had gone bad. States last episode of diarrhea was this morning at 5am. She does admit she has not been drinking as she should be to replace fluids.She reports she had a loss of consciousness on 24. States she was feeling tired and dizzy and then passed out. EMS was called and she was transported to [facility] in [place]. The doctor there diagnosed low BP and took her off Lisinopril. She was seen at [facility] on  and currently has a heart monitor she is wearing. She presents BP log with readings ranging from 116/66- 126/74. She denies any further syncopal episodes.             Review of Systems     @Review of Systems   Constitutional:  Negative for activity change, appetite change,  fatigue and fever.   HENT:  Negative for nasal congestion, ear pain, rhinorrhea, sinus pressure/congestion and sore throat.    Eyes:  Negative for pain, redness, visual disturbance and eye dryness.   Respiratory:  Negative for cough and shortness of breath.    Cardiovascular:  Negative for chest pain and leg swelling.   Gastrointestinal:  Positive for abdominal pain and diarrhea. Negative for abdominal distention and constipation.   Endocrine: Negative for cold intolerance, heat intolerance and polyuria.   Genitourinary:  Negative for bladder incontinence, dysuria, frequency and urgency.   Musculoskeletal:  Negative for arthralgias, gait problem and myalgias.   Integumentary:  Negative for color change, rash and wound.   Allergic/Immunologic: Negative for environmental allergies and food allergies.   Neurological:  Negative for dizziness, weakness, light-headedness and headaches.   Psychiatric/Behavioral:  Negative for behavioral problems and sleep disturbance.        Medical / Social / Family History     Past Medical History:   Diagnosis Date    Cervical disc disorder     Hiatal hernia with GERD     Hyperlipidemia     Hypertension     Morbid obesity with BMI of 40.0-44.9, adult     Nonalcoholic fatty liver disease     Stroke     Thoracic aortic aneurysm     Unspecified osteoarthritis, unspecified site     Vitamin D deficiency 12/22/2022       Past Surgical History:   Procedure Laterality Date    CARDIAC CATHETERIZATION Left 07/31/2008    Performed by Dr. Bruce Cramer    CARPAL TUNNEL RELEASE  12/21/2017    Dr. Moran with cyst removed from left middle finger    CHOLECYSTECTOMY      COLONOSCOPY      DIAGNOSTIC LAPAROSCOPY      DILATION AND CURETTAGE OF UTERUS      EGD, WITH BALLOON DILATION  2018    Performed by Dr. Marc Lomax    LEFT HEART CATHETERIZATION  05/25/2023    TOTAL REDUCTION MAMMOPLASTY         Medications and Allergies     Medications  Current Outpatient Medications   Medication Sig Dispense  Refill    cyanocobalamin (VITAMIN B-12) 1000 MCG tablet 1 tablet.      ergocalciferol (ERGOCALCIFEROL) 50,000 unit Cap TAKE 1 CAPSULE BY MOUTH TWICE WEEKLY AS DIRECTED 24 capsule 1    mecobalamin, vitamin B12, 1,000 mcg TbDL DISSOLVE 1 TABLET on the tongue ONCE DAILY 30 tablet 0    vit c-ascorbate Ca-ascorb sod (VITAMIN C) 500 mg/15 mL Liqd as directed      baclofen (LIORESAL) 10 MG tablet TAKE ONE TABLET BY MOUTH THREE TIMES DAILY (Patient not taking: Reported on 3/25/2024) 90 tablet 1    doxycycline (VIBRAMYCIN) 100 MG Cap Take 1 capsule (100 mg total) by mouth 2 (two) times daily. (Patient not taking: Reported on 4/24/2024) 20 capsule 0    hydrocortisone 2.5 % lotion Apply 2.5 each topically once daily. (Patient not taking: Reported on 4/24/2024)      lisinopriL (PRINIVIL,ZESTRIL) 2.5 MG tablet Take 1 tablet (2.5 mg total) by mouth 2 (two) times daily. (Patient not taking: Reported on 4/24/2024) 180 tablet 1    lisinopriL (PRINIVIL,ZESTRIL) 5 MG tablet TAKE ONE TABLET BY MOUTH ONCE DAILY FOR high blood PRESSURE (Patient not taking: Reported on 4/24/2024) 90 tablet 1    mupirocin (BACTROBAN) 2 % ointment Apply topically 3 (three) times daily. (Patient not taking: Reported on 4/24/2024) 30 g 1     No current facility-administered medications for this visit.       Allergies  Review of patient's allergies indicates:   Allergen Reactions    Penicillins Hives and Rash     Can take rocephin  Other reaction(s): > 10 years ago  Can take rocephin      Celecoxib     Ciprofloxacin      States she can not take them due to aneurism     Fentanyl Other (See Comments)     Extreme sedation  Other reaction(s): Other (See Comments)  Extreme sedation  Extreme sedation      Hydrochlorothiazide Other (See Comments)     Reaction unknown  Other reaction(s): Other (See Comments)  Reaction unknown  Reaction unknown    Promethazine hcl Other (See Comments)     Hallucinations    Betamethasone Other (See Comments) and Rash      Flushing  Flushing  Flushing    Hydromorphone hcl Palpitations    Latex Rash    Morphine Palpitations    Sulfa (sulfonamide antibiotics) Itching       Physical Examination     Vitals:    04/24/24 1347   BP: 123/65   Pulse: 69   Resp: 18   Temp: 98.5 °F (36.9 °C)     Physical Exam  Vitals and nursing note reviewed.   Constitutional:       Appearance: She is obese.   HENT:      Head: Normocephalic.      Right Ear: Tympanic membrane normal.      Left Ear: Tympanic membrane normal.      Nose: Nose normal.      Mouth/Throat:      Mouth: Mucous membranes are moist.      Pharynx: Oropharynx is clear. No posterior oropharyngeal erythema.   Eyes:      Conjunctiva/sclera: Conjunctivae normal.   Cardiovascular:      Rate and Rhythm: Normal rate and regular rhythm.      Pulses: Normal pulses.      Heart sounds: Normal heart sounds.   Pulmonary:      Effort: Pulmonary effort is normal.      Breath sounds: Normal breath sounds.   Abdominal:      General: Abdomen is flat. Bowel sounds are increased. There is no distension.      Palpations: Abdomen is soft.      Tenderness: There is generalized abdominal tenderness.   Musculoskeletal:         General: No swelling or tenderness. Normal range of motion.      Cervical back: Normal range of motion.      Right lower leg: No edema.      Left lower leg: No edema.   Skin:     General: Skin is warm and dry.      Capillary Refill: Capillary refill takes less than 2 seconds.   Neurological:      Mental Status: She is alert. Mental status is at baseline.   Psychiatric:         Mood and Affect: Mood normal.         Behavior: Behavior normal.               Lab Results   Component Value Date    WBC 10.30 04/08/2024    HGB 12.4 04/08/2024    HCT 37.6 (L) 04/08/2024    MCV 91.5 04/08/2024     04/08/2024        CMP  Sodium   Date Value Ref Range Status   04/08/2024 142 136 - 145 mmol/L Final     Potassium   Date Value Ref Range Status   04/08/2024 3.7 3.5 - 5.1 mmol/L Final     Chloride    Date Value Ref Range Status   04/08/2024 108 (H) 98 - 107 mmol/L Final     CO2   Date Value Ref Range Status   04/08/2024 28 21 - 32 mmol/L Final     Glucose   Date Value Ref Range Status   04/08/2024 115 (H) 74 - 106 mg/dL Final     BUN   Date Value Ref Range Status   04/08/2024 28 (H) 7 - 18 mg/dL Final     Creatinine   Date Value Ref Range Status   04/08/2024 0.85 0.55 - 1.02 mg/dL Final     Calcium   Date Value Ref Range Status   04/08/2024 9.6 8.5 - 10.1 mg/dL Final     Total Protein   Date Value Ref Range Status   04/08/2024 7.0 6.4 - 8.2 g/dL Final     Albumin   Date Value Ref Range Status   04/08/2024 3.7 3.5 - 5.0 g/dL Final     Bilirubin, Total   Date Value Ref Range Status   04/08/2024 0.2 >0.0 - 1.2 mg/dL Final     Alk Phos   Date Value Ref Range Status   04/08/2024 117 46 - 118 U/L Final     AST   Date Value Ref Range Status   04/08/2024 35 15 - 37 U/L Final     ALT   Date Value Ref Range Status   04/08/2024 53 13 - 56 U/L Final     Anion Gap   Date Value Ref Range Status   04/08/2024 10 7 - 16 mmol/L Final     eGFR   Date Value Ref Range Status   04/08/2024 79 >=60 mL/min/1.73m2 Final     Procedures   Assessment and Plan (including Health Maintenance)   :    Plan:     Problem List Items Addressed This Visit          Cardiac/Vascular    Hypertension    Current Assessment & Plan     Currently well controlled without meds. Continue to monitor at home and follow up with [facility] as scheduled.             GI    Gastroenteritis - Primary    Current Assessment & Plan     Symptoms have actually resolved at this time with last episode of diarrhea around 5am this morning. UA was obtained and was normal with exception of trace blood. Patient has not been drinking fluids and is most likely related to mild dehydration. Encouraged her to increase fluids. Follow up if symptoms persist or worsen.          Relevant Orders    POCT URINALYSIS W/O SCOPE (Completed)       Health Maintenance Topics with due status:  Not Due       Topic Last Completion Date    Colorectal Cancer Screening 12/14/2015    Hemoglobin A1c (Diabetic Prevention Screening) 12/22/2022    Cervical Cancer Screening 02/23/2023    Lipid Panel 03/05/2024       Future Appointments   Date Time Provider Department Center   7/17/2024  2:40 PM [facility]   9/4/2024  2:00 PM Zahida Blanchard NP [facility]        Health Maintenance Due   Topic Date Due    Hepatitis C Screening  Never done    Pneumococcal Vaccines (Age 0-64) (1 of 2 - PCV) Never done    HIV Screening  Never done    TETANUS VACCINE  Never done    Shingles Vaccine (1 of 2) Never done    COVID-19 Vaccine (1 - 2023-24 season) Never done    Mammogram  05/04/2024          Signature:  Zahida Blanchard NP  [facility]  [address] [place] [address]  [id number]    Date of encounter: 4/24/24

## 2024-04-24 NOTE — ASSESSMENT & PLAN NOTE
Symptoms have actually resolved at this time with last episode of diarrhea around 5am this morning. UA was obtained and was normal with exception of trace blood. Patient has not been drinking fluids and is most likely related to mild dehydration. Encouraged her to increase fluids. Follow up if symptoms persist or worsen.

## 2024-06-20 NOTE — TELEPHONE ENCOUNTER
Pt called and requested a Rx refill on Lisinopril 5mg to [facility] in [place]. She stated that she was taken off of this medication in the past due to hypotension but she has been restarted on it now. Next appt with Zahida Blanchard in September 2024.

## 2024-06-21 RX ORDER — LISINOPRIL 5 MG/1
5 TABLET ORAL DAILY
Qty: 90 TABLET | Refills: 0 | Status: SHIPPED | OUTPATIENT
Start: 2024-06-21

## 2024-07-15 DIAGNOSIS — E55.9 VITAMIN D DEFICIENCY: ICD-10-CM

## 2024-07-15 RX ORDER — ERGOCALCIFEROL 1.25 MG/1
CAPSULE ORAL
Qty: 24 CAPSULE | Refills: 1 | OUTPATIENT
Start: 2024-07-15

## 2024-08-06 ENCOUNTER — HOSPITAL ENCOUNTER (OUTPATIENT)
Dept: RADIOLOGY | Facility: HOSPITAL | Age: 59
Discharge: HOME | End: 2024-08-06
Attending: RADIOLOGY
Payer: COMMERCIAL

## 2024-08-06 DIAGNOSIS — Z12.31 VISIT FOR SCREENING MAMMOGRAM: ICD-10-CM

## 2024-08-06 PROCEDURE — 77063 BREAST TOMOSYNTHESIS BI: CPT | Mod: TC

## 2024-08-06 PROCEDURE — 77067 SCR MAMMO BI INCL CAD: CPT | Mod: TC

## 2024-08-06 PROCEDURE — 77067 SCR MAMMO BI INCL CAD: CPT | Mod: 26,,, | Performed by: RADIOLOGY

## 2024-08-06 PROCEDURE — 77063 BREAST TOMOSYNTHESIS BI: CPT | Mod: 26,,, | Performed by: RADIOLOGY

## 2024-08-12 DIAGNOSIS — E55.9 VITAMIN D DEFICIENCY: ICD-10-CM

## 2024-08-12 RX ORDER — ERGOCALCIFEROL 1.25 MG/1
CAPSULE ORAL
Qty: 24 CAPSULE | Refills: 1 | OUTPATIENT
Start: 2024-08-12

## 2024-08-19 PROCEDURE — 88304 TISSUE EXAM BY PATHOLOGIST: CPT | Mod: 26,,, | Performed by: PATHOLOGY

## 2024-08-19 PROCEDURE — 88304 TISSUE EXAM BY PATHOLOGIST: CPT | Mod: TC,SUR | Performed by: DERMATOLOGY

## 2024-08-20 ENCOUNTER — LAB REQUISITION (OUTPATIENT)
Dept: LAB | Facility: HOSPITAL | Age: 59
End: 2024-08-20
Attending: DERMATOLOGY
Payer: COMMERCIAL

## 2024-08-20 DIAGNOSIS — R20.8 OTHER DISTURBANCES OF SKIN SENSATION: ICD-10-CM

## 2024-08-20 DIAGNOSIS — L53.8 OTHER SPECIFIED ERYTHEMATOUS CONDITIONS: ICD-10-CM

## 2024-08-20 DIAGNOSIS — L29.8 OTHER PRURITUS: ICD-10-CM

## 2024-08-20 DIAGNOSIS — D49.2 NEOPLASM OF UNSPECIFIED BEHAVIOR OF BONE, SOFT TISSUE, AND SKIN: ICD-10-CM

## 2024-08-21 LAB
ESTROGEN SERPL-MCNC: NORMAL PG/ML
INSULIN SERPL-ACNC: NORMAL U[IU]/ML
LAB AP GROSS DESCRIPTION: NORMAL
LAB AP LABORATORY NOTES: NORMAL
LAB AP SPEC A DDX: NORMAL
LAB AP SPEC A MORPHOLOGY: NORMAL
LAB AP SPEC A PROCEDURE: NORMAL
T3RU NFR SERPL: NORMAL %

## 2024-09-09 ENCOUNTER — OFFICE VISIT (OUTPATIENT)
Dept: FAMILY MEDICINE | Facility: CLINIC | Age: 59
End: 2024-09-09
Payer: COMMERCIAL

## 2024-09-09 VITALS
RESPIRATION RATE: 14 BRPM | SYSTOLIC BLOOD PRESSURE: 126 MMHG | HEART RATE: 63 BPM | DIASTOLIC BLOOD PRESSURE: 76 MMHG | HEIGHT: 65 IN | BODY MASS INDEX: 39.32 KG/M2 | WEIGHT: 236 LBS | OXYGEN SATURATION: 98 % | TEMPERATURE: 98 F

## 2024-09-09 DIAGNOSIS — E78.5 HYPERLIPIDEMIA, UNSPECIFIED HYPERLIPIDEMIA TYPE: ICD-10-CM

## 2024-09-09 DIAGNOSIS — I10 HYPERTENSION, UNSPECIFIED TYPE: Primary | ICD-10-CM

## 2024-09-09 LAB
ALBUMIN SERPL BCP-MCNC: 3.8 G/DL (ref 3.5–5)
ALBUMIN/GLOB SERPL: 1.3 {RATIO}
ALP SERPL-CCNC: 89 U/L (ref 46–118)
ALT SERPL W P-5'-P-CCNC: 25 U/L (ref 13–56)
ANION GAP SERPL CALCULATED.3IONS-SCNC: 10 MMOL/L (ref 7–16)
AST SERPL W P-5'-P-CCNC: 14 U/L (ref 15–37)
BILIRUB SERPL-MCNC: 0.3 MG/DL (ref ?–1.2)
BUN SERPL-MCNC: 14 MG/DL (ref 7–18)
BUN/CREAT SERPL: 20 (ref 6–20)
CALCIUM SERPL-MCNC: 9.1 MG/DL (ref 8.5–10.1)
CHLORIDE SERPL-SCNC: 109 MMOL/L (ref 98–107)
CHOLEST SERPL-MCNC: 207 MG/DL (ref 0–200)
CHOLEST/HDLC SERPL: 4.1 {RATIO}
CO2 SERPL-SCNC: 27 MMOL/L (ref 21–32)
CREAT SERPL-MCNC: 0.7 MG/DL (ref 0.55–1.02)
EGFR (NO RACE VARIABLE) (RUSH/TITUS): 100 ML/MIN/1.73M2
GLOBULIN SER-MCNC: 3 G/DL (ref 2–4)
GLUCOSE SERPL-MCNC: 97 MG/DL (ref 74–106)
HDLC SERPL-MCNC: 50 MG/DL (ref 40–60)
LDLC SERPL CALC-MCNC: 135 MG/DL
LDLC/HDLC SERPL: 2.7 {RATIO}
NONHDLC SERPL-MCNC: 157 MG/DL
POTASSIUM SERPL-SCNC: 4.3 MMOL/L (ref 3.5–5.1)
PROT SERPL-MCNC: 6.8 G/DL (ref 6.4–8.2)
SODIUM SERPL-SCNC: 142 MMOL/L (ref 136–145)
TRIGL SERPL-MCNC: 110 MG/DL (ref 35–150)
VLDLC SERPL-MCNC: 22 MG/DL

## 2024-09-09 PROCEDURE — 4010F ACE/ARB THERAPY RXD/TAKEN: CPT | Mod: CPTII,,,

## 2024-09-09 PROCEDURE — 80061 LIPID PANEL: CPT | Mod: ,,, | Performed by: CLINICAL MEDICAL LABORATORY

## 2024-09-09 PROCEDURE — 1159F MED LIST DOCD IN RCRD: CPT | Mod: CPTII,,,

## 2024-09-09 PROCEDURE — 99214 OFFICE O/P EST MOD 30 MIN: CPT | Mod: ,,,

## 2024-09-09 PROCEDURE — 1160F RVW MEDS BY RX/DR IN RCRD: CPT | Mod: CPTII,,,

## 2024-09-09 PROCEDURE — 3008F BODY MASS INDEX DOCD: CPT | Mod: CPTII,,,

## 2024-09-09 PROCEDURE — 3078F DIAST BP <80 MM HG: CPT | Mod: CPTII,,,

## 2024-09-09 PROCEDURE — 80053 COMPREHEN METABOLIC PANEL: CPT | Mod: ,,, | Performed by: CLINICAL MEDICAL LABORATORY

## 2024-09-09 PROCEDURE — 3074F SYST BP LT 130 MM HG: CPT | Mod: CPTII,,,

## 2024-09-09 RX ORDER — LISINOPRIL 5 MG/1
5 TABLET ORAL DAILY
Qty: 90 TABLET | Refills: 1 | Status: SHIPPED | OUTPATIENT
Start: 2024-09-09

## 2024-09-09 RX ORDER — LISINOPRIL 2.5 MG/1
2.5 TABLET ORAL DAILY
Qty: 90 TABLET | Refills: 1 | Status: SHIPPED | OUTPATIENT
Start: 2024-09-09

## 2024-09-09 NOTE — ASSESSMENT & PLAN NOTE
"Lipid panel obtained at today's visit. Goal LDL is less than 70. Continue current meds and low fat/low cholesterol diet with increased exercise as tolerated. Will follow up with labs. Patient to follow up in 3 months or as needed    A few changes in your diet can reduce cholesterol and improve your heart health. Saturated fats, found primarily in red meat and full-fat dairy products, raise your total cholesterol. Decreasing your consumption of saturated fats can reduce your low-density lipoprotein (LDL) cholesterol. rans fats, sometimes listed on food labels as "partially hydrogenated vegetable oil," are often used in margarines and store-bought cookies, crackers and cakes.     Trans fats raise overall cholesterol levels. Omega-3 fatty acids don't affect LDL cholesterol. But they have other heart-healthy benefits, including reducing blood pressure. Foods with omega-3 fatty acids include salmon, mackerel, herring, walnuts and flaxseeds.Soluble fiber can reduce the absorption of cholesterol into your bloodstream. Soluble fiber is found in such foods as oatmeal, kidney beans, [place] sprouts, apples and pears.  at least 30 minutes of exercise five times a week or vigorous aerobic activity for 20 minutes three times a week if tolerated.    "

## 2024-09-09 NOTE — ASSESSMENT & PLAN NOTE
Currently well controlled without meds. Continue to monitor at home and follow up with [facility] as scheduled.

## 2024-09-09 NOTE — PROGRESS NOTES
MARY RAMOS   [place] [id number]  [place]  [address]      PATIENT NAME: Mally Woods  : 1965  DATE: 24  MRN: [id number]        Reason for Visit / Chief Complaint: Hypertension (Routine follow up for medication refill. Pt takes Lisinopril 5 mg daily but is requesting to have a Rx of the 5 mg and 2.5 mg because she doesn't always need to stronger dose. ) and Hyperlipidemia (Routine visit for fasting lipid panel.)         History of Present Illness / Problem Focused Workflow     57 y/o female presents to clinic for medication refills and lab work. Denies any concerns or questions at present      Review of Systems     @Review of Systems   Constitutional:  Negative for chills, fatigue and fever.   HENT:  Negative for nasal congestion, ear discharge, ear pain, rhinorrhea, sinus pressure/congestion and sore throat.    Respiratory:  Negative for cough, chest tightness, shortness of breath, wheezing and stridor.    Cardiovascular:  Negative for palpitations and claudication.   Gastrointestinal:  Negative for abdominal pain, constipation, diarrhea, nausea, vomiting and reflux.   Genitourinary:  Negative for dysuria, flank pain, frequency, hematuria and urgency.   Musculoskeletal:  Negative for myalgias.   Neurological:  Negative for dizziness, weakness, light-headedness and headaches.   Psychiatric/Behavioral:  Negative for suicidal ideas.        Medical / Social / Family History     Past Medical History:   Diagnosis Date    Cervical disc disorder     Hiatal hernia with GERD     Hyperlipidemia     Hypertension     Morbid obesity with BMI of 40.0-44.9, adult     Nonalcoholic fatty liver disease     Stroke     Thoracic aortic aneurysm     Unspecified osteoarthritis, unspecified site     Vitamin D deficiency 2022       Past Surgical History:   Procedure Laterality Date    CARDIAC CATHETERIZATION Left 2008    Performed by Dr. Bruce Cramer    CARPAL TUNNEL RELEASE   12/21/2017    Dr. Moran with cyst removed from left middle finger    CHOLECYSTECTOMY      COLONOSCOPY      DIAGNOSTIC LAPAROSCOPY      DILATION AND CURETTAGE OF UTERUS      EGD, WITH BALLOON DILATION  2018    Performed by Dr. Marc Lomax    LEFT HEART CATHETERIZATION  05/25/2023    TOTAL REDUCTION MAMMOPLASTY             Medications and Allergies     Medications  Outpatient Medications Marked as Taking for the 9/9/24 encounter (Office Visit) with Fito Rosario FNP   Medication Sig Dispense Refill    cyanocobalamin (VITAMIN B-12) 1000 MCG tablet 1 tablet.      ergocalciferol (ERGOCALCIFEROL) 50,000 unit Cap TAKE 1 CAPSULE BY MOUTH TWICE WEEKLY AS DIRECTED 24 capsule 1    mecobalamin, vitamin B12, 1,000 mcg TbDL DISSOLVE 1 TABLET on the tongue ONCE DAILY 30 tablet 0    vit c-ascorbate Ca-ascorb sod (VITAMIN C) 500 mg/15 mL Liqd as directed      [DISCONTINUED] lisinopriL (PRINIVIL,ZESTRIL) 5 MG tablet Take 1 tablet (5 mg total) by mouth once daily. 90 tablet 0       Allergies  Review of patient's allergies indicates:   Allergen Reactions    Penicillins Hives and Rash     Can take rocephin  Other reaction(s): > 10 years ago  Can take rocephin      Celecoxib     Ciprofloxacin      States she can not take them due to aneurism     Fentanyl Other (See Comments)     Extreme sedation  Other reaction(s): Other (See Comments)  Extreme sedation  Extreme sedation      Hydrochlorothiazide Other (See Comments)     Reaction unknown  Other reaction(s): Other (See Comments)  Reaction unknown  Reaction unknown    Promethazine hcl Other (See Comments)     Hallucinations    Betamethasone Other (See Comments) and Rash     Flushing  Flushing  Flushing    Hydromorphone hcl Palpitations    Latex Rash    Morphine Palpitations    Sulfa (sulfonamide antibiotics) Itching       Physical Examination     Vitals:    09/09/24 0833   BP: 126/76   Pulse: 63   Resp: 14   Temp: 98.1 °F (36.7 °C)     Physical Exam  Vitals and nursing note reviewed.    Constitutional:       General: She is awake.      Appearance: Normal appearance.   HENT:      Head: Normocephalic.      Right Ear: Tympanic membrane, ear canal and external ear normal.      Left Ear: Tympanic membrane, ear canal and external ear normal.      Nose: Nose normal.      Mouth/Throat:      Lips: Pink.      Mouth: Mucous membranes are moist.      Pharynx: Oropharynx is clear. Uvula midline.   Cardiovascular:      Rate and Rhythm: Normal rate and regular rhythm.      Heart sounds: Normal heart sounds, S1 normal and S2 normal.   Pulmonary:      Effort: Pulmonary effort is normal. No respiratory distress.      Breath sounds: Normal breath sounds. No decreased breath sounds, wheezing, rhonchi or rales.   Abdominal:      General: Bowel sounds are normal.      Palpations: Abdomen is soft.      Tenderness: There is no abdominal tenderness.   Musculoskeletal:      Cervical back: Normal range of motion.   Skin:     General: Skin is warm.      Capillary Refill: Capillary refill takes less than 2 seconds.   Neurological:      Mental Status: She is alert and oriented to person, place, and time.   Psychiatric:         Thought Content: Thought content does not include homicidal or suicidal ideation. Thought content does not include homicidal or suicidal plan.               Procedures   Assessment and Plan (including Health Maintenance)   :    Plan:     Problem List Items Addressed This Visit          Cardiac/Vascular    Hypertension - Primary    Current Assessment & Plan     Currently well controlled without meds. Continue to monitor at home and follow up with [facility] as scheduled.          Relevant Medications    lisinopriL (PRINIVIL,ZESTRIL) 5 MG tablet    lisinopriL (PRINIVIL,ZESTRIL) 2.5 MG tablet    Other Relevant Orders    Comprehensive Metabolic Panel    Hyperlipidemia    Current Assessment & Plan     Lipid panel obtained at today's visit. Goal LDL is less than 70. Continue current meds and low fat/low  "cholesterol diet with increased exercise as tolerated. Will follow up with labs. Patient to follow up in 3 months or as needed    A few changes in your diet can reduce cholesterol and improve your heart health. Saturated fats, found primarily in red meat and full-fat dairy products, raise your total cholesterol. Decreasing your consumption of saturated fats can reduce your low-density lipoprotein (LDL) cholesterol. rans fats, sometimes listed on food labels as "partially hydrogenated vegetable oil," are often used in margarines and store-bought cookies, crackers and cakes.     Trans fats raise overall cholesterol levels. Omega-3 fatty acids don't affect LDL cholesterol. But they have other heart-healthy benefits, including reducing blood pressure. Foods with omega-3 fatty acids include salmon, mackerel, herring, walnuts and flaxseeds.Soluble fiber can reduce the absorption of cholesterol into your bloodstream. Soluble fiber is found in such foods as oatmeal, kidney beans, [place] sprouts, apples and pears.  at least 30 minutes of exercise five times a week or vigorous aerobic activity for 20 minutes three times a week if tolerated.           Relevant Orders    Lipid Panel       Health Maintenance Topics with due status: Not Due       Topic Last Completion Date    Colorectal Cancer Screening 12/14/2015    Hemoglobin A1c (Diabetic Prevention Screening) 12/22/2022    Cervical Cancer Screening 02/23/2023    Lipid Panel 03/05/2024    Mammogram 08/06/2024       Future Appointments   Date Time Provider Department Center   9/16/2024  3:00 PM Emily Mendoza FNP [facility]   9/18/2025  [facility]        Health Maintenance Due   Topic Date Due    Hepatitis C Screening  Never done    Pneumococcal Vaccines (Age 0-64) (1 of 2 - PCV) Never done    HIV Screening  Never done    TETANUS VACCINE  Never done    Shingles Vaccine (1 of 2) Never done    Influenza Vaccine (1) " 09/01/2024    COVID-19 Vaccine (1 - 2023-24 season) Never done        Follow up in about 6 months (around 3/9/2025).     Signature:  MARY RAMOS  [address] [place]  [address]    Date of encounter: 9/9/24

## 2024-09-09 NOTE — LETTER
September 9, 2024      [facility]  [address] [address] [place] [address]  Phone: [phone]  Fax: [phone]       Patient: Mally Woods   YOB: 1965  Date of Visit: 09/09/2024    To Whom It May Concern:    Moises Woods  was at [facility] on 09/09/2024. The patient may return to work/school on 09/9/24 with no restrictions. If you have any questions or concerns, or if I can be of further assistance, please do not hesitate to contact me.    Sincerely,    MARY Montanez

## 2024-09-13 ENCOUNTER — OFFICE VISIT (OUTPATIENT)
Dept: FAMILY MEDICINE | Facility: CLINIC | Age: 59
End: 2024-09-13
Payer: COMMERCIAL

## 2024-09-13 VITALS
DIASTOLIC BLOOD PRESSURE: 80 MMHG | WEIGHT: 235.38 LBS | OXYGEN SATURATION: 98 % | HEART RATE: 64 BPM | TEMPERATURE: 98 F | SYSTOLIC BLOOD PRESSURE: 122 MMHG | HEIGHT: 65 IN | BODY MASS INDEX: 39.22 KG/M2 | RESPIRATION RATE: 18 BRPM

## 2024-09-13 DIAGNOSIS — N30.91 CYSTITIS WITH HEMATURIA: Primary | ICD-10-CM

## 2024-09-13 LAB
BILIRUB SERPL-MCNC: NEGATIVE MG/DL
BLOOD URINE, POC: ABNORMAL
CLARITY, UA: CLEAR
COLOR, UA: YELLOW
GLUCOSE UR QL STRIP: NEGATIVE
KETONES UR QL STRIP: NEGATIVE
LEUKOCYTE ESTERASE URINE, POC: NEGATIVE
NITRITE, POC UA: NEGATIVE
PH, POC UA: 5
PROTEIN, POC: NEGATIVE
SPECIFIC GRAVITY, POC UA: >=1.03
UROBILINOGEN, POC UA: ABNORMAL

## 2024-09-13 RX ORDER — NITROFURANTOIN 25; 75 MG/1; MG/1
100 CAPSULE ORAL 2 TIMES DAILY
Qty: 10 CAPSULE | Refills: 0 | Status: SHIPPED | OUTPATIENT
Start: 2024-09-13

## 2024-09-13 RX ORDER — NITROFURANTOIN 25; 75 MG/1; MG/1
100 CAPSULE ORAL 2 TIMES DAILY
Qty: 14 CAPSULE | Refills: 0 | Status: SHIPPED | OUTPATIENT
Start: 2024-09-13 | End: 2024-09-13

## 2024-09-13 NOTE — PROGRESS NOTES
Zahida Blanchard NP   [place] [address]  [place]  [address]      PATIENT NAME: Mally Woods  : 1965  DATE: 24  MRN: [id number]      Billing Provider: Zahida Blanchard NP  Level of Service: DC OFFICE/OUTPT VISIT, EST, LEVL III, 20-29 MIN  Patient PCP Information       Provider PCP Type    Zahida Blanchard NP General            Reason for Visit / Chief Complaint: Urinary Frequency (Patient is a 59 year old female presenting with urinary frequency and urgency for the last few days.  States that her urine has been a little cloudy and has an odor to it.  Also reports some back pain and lower abdominal pain.  States that she has been drinking a lot of water and cranberry juice.) and Health Maintenance (Hepatitis C Screening Never done---declined/Pneumococcal Vaccines (Age 0-64)(1 of 2 - PCV) Never done---declined/HIV Screening Never done---declined/TETANUS VACCINE Never done---declined/Shingles Vaccine(1 of 2) Never done---declined/Influenza Vaccine(1) due on 2024---declined/COVID-19 Vaccine( - - season) Never done---declined/RSV Vaccine (Age 60+ and Pregnant patients)(1 - 1-dose 60+ series) Never done---declined )         History of Present Illness / Problem Focused Workflow     59 year old female presenting with urinary frequency and urgency for the last few days.  States that her urine has been a little cloudy and has an odor to it.  Also reports some back pain and lower abdominal pain.  States that she has been drinking a lot of water and cranberry juice.          Review of Systems     @Review of Systems   Constitutional:  Negative for activity change, appetite change, fatigue and fever.   HENT:  Negative for nasal congestion, ear pain, rhinorrhea, sinus pressure/congestion and sore throat.    Eyes:  Negative for pain, redness, visual disturbance and eye dryness.   Respiratory:  Negative for cough and shortness of breath.    Cardiovascular:  Negative for chest  pain and leg swelling.   Gastrointestinal:  Negative for abdominal distention, abdominal pain, constipation and diarrhea.   Endocrine: Negative for cold intolerance, heat intolerance and polyuria.   Genitourinary:  Positive for dysuria, frequency and urgency. Negative for bladder incontinence.   Musculoskeletal:  Positive for back pain. Negative for arthralgias, gait problem and myalgias.   Integumentary:  Negative for color change, rash and wound.   Allergic/Immunologic: Negative for environmental allergies and food allergies.   Neurological:  Negative for dizziness, weakness, light-headedness and headaches.   Psychiatric/Behavioral:  Negative for behavioral problems and sleep disturbance.        Medical / Social / Family History     Past Medical History:   Diagnosis Date    Cervical disc disorder     Hiatal hernia with GERD     Hyperlipidemia     Hypertension     Morbid obesity with BMI of 40.0-44.9, adult     Nonalcoholic fatty liver disease     Stroke     Thoracic aortic aneurysm     Unspecified osteoarthritis, unspecified site     Vitamin D deficiency 12/22/2022       Past Surgical History:   Procedure Laterality Date    CARDIAC CATHETERIZATION Left 07/31/2008    Performed by Dr. Bruce Cramer    CARPAL TUNNEL RELEASE  12/21/2017    Dr. Moran with cyst removed from left middle finger    CHOLECYSTECTOMY      COLONOSCOPY      DIAGNOSTIC LAPAROSCOPY      DILATION AND CURETTAGE OF UTERUS      EGD, WITH BALLOON DILATION  2018    Performed by Dr. Marc Lomax    LEFT HEART CATHETERIZATION  05/25/2023    TOTAL REDUCTION MAMMOPLASTY         Medications and Allergies     Medications  Outpatient Medications Marked as Taking for the 9/13/24 encounter (Office Visit) with Zahida Blanchard NP   Medication Sig Dispense Refill    cyanocobalamin (VITAMIN B-12) 1000 MCG tablet 1 tablet.      ergocalciferol (ERGOCALCIFEROL) 50,000 unit Cap TAKE 1 CAPSULE BY MOUTH TWICE WEEKLY AS DIRECTED 24 capsule 1    lisinopriL  (PRINIVIL,ZESTRIL) 2.5 MG tablet Take 1 tablet (2.5 mg total) by mouth once daily. 90 tablet 1    lisinopriL (PRINIVIL,ZESTRIL) 5 MG tablet Take 1 tablet (5 mg total) by mouth once daily. 90 tablet 1    mecobalamin, vitamin B12, 1,000 mcg TbDL DISSOLVE 1 TABLET on the tongue ONCE DAILY 30 tablet 0    vit c-ascorbate Ca-ascorb sod (VITAMIN C) 500 mg/15 mL Liqd as directed         Allergies  Review of patient's allergies indicates:   Allergen Reactions    Penicillins Hives and Rash     Can take rocephin  Other reaction(s): > 10 years ago  Can take rocephin      Celecoxib     Ciprofloxacin      States she can not take them due to aneurism     Fentanyl Other (See Comments)     Extreme sedation  Other reaction(s): Other (See Comments)  Extreme sedation  Extreme sedation      Hydrochlorothiazide Other (See Comments)     Reaction unknown  Other reaction(s): Other (See Comments)  Reaction unknown  Reaction unknown    Promethazine hcl Other (See Comments)     Hallucinations    Betamethasone Other (See Comments) and Rash     Flushing  Flushing  Flushing    Hydromorphone hcl Palpitations    Latex Rash    Morphine Palpitations    Sulfa (sulfonamide antibiotics) Itching       Physical Examination     Vitals:    09/13/24 1109   BP: 122/80   Pulse: 64   Resp: 18   Temp: 98.2 °F (36.8 °C)     Physical Exam  Vitals and nursing note reviewed.   HENT:      Head: Normocephalic.      Nose: Nose normal.      Mouth/Throat:      Mouth: Mucous membranes are moist.      Pharynx: Oropharynx is clear.   Eyes:      Conjunctiva/sclera: Conjunctivae normal.   Cardiovascular:      Rate and Rhythm: Normal rate and regular rhythm.      Pulses: Normal pulses.      Heart sounds: Normal heart sounds.   Pulmonary:      Effort: Pulmonary effort is normal.   Abdominal:      General: Abdomen is flat. Bowel sounds are normal.      Palpations: Abdomen is soft.      Tenderness: There is abdominal tenderness in the suprapubic area. There is no right CVA  tenderness or left CVA tenderness.   Musculoskeletal:         General: Normal range of motion.      Cervical back: Normal range of motion.   Skin:     General: Skin is warm and dry.      Capillary Refill: Capillary refill takes less than 2 seconds.   Neurological:      Mental Status: She is alert. Mental status is at baseline.   Psychiatric:         Mood and Affect: Mood normal.         Behavior: Behavior normal.               Lab Results   Component Value Date    WBC 10.30 04/08/2024    HGB 12.4 04/08/2024    HCT 37.6 (L) 04/08/2024    MCV 91.5 04/08/2024     04/08/2024        CMP  Sodium   Date Value Ref Range Status   09/09/2024 142 136 - 145 mmol/L Final     Potassium   Date Value Ref Range Status   09/09/2024 4.3 3.5 - 5.1 mmol/L Final     Chloride   Date Value Ref Range Status   09/09/2024 109 (H) 98 - 107 mmol/L Final     CO2   Date Value Ref Range Status   09/09/2024 27 21 - 32 mmol/L Final     Glucose   Date Value Ref Range Status   09/09/2024 97 74 - 106 mg/dL Final     BUN   Date Value Ref Range Status   09/09/2024 14 7 - 18 mg/dL Final     Creatinine   Date Value Ref Range Status   09/09/2024 0.70 0.55 - 1.02 mg/dL Final     Calcium   Date Value Ref Range Status   09/09/2024 9.1 8.5 - 10.1 mg/dL Final     Total Protein   Date Value Ref Range Status   09/09/2024 6.8 6.4 - 8.2 g/dL Final     Albumin   Date Value Ref Range Status   09/09/2024 3.8 3.5 - 5.0 g/dL Final     Bilirubin, Total   Date Value Ref Range Status   09/09/2024 0.3 >0.0 - 1.2 mg/dL Final     Alk Phos   Date Value Ref Range Status   09/09/2024 89 46 - 118 U/L Final     AST   Date Value Ref Range Status   09/09/2024 14 (L) 15 - 37 U/L Final     ALT   Date Value Ref Range Status   09/09/2024 25 13 - 56 U/L Final     Anion Gap   Date Value Ref Range Status   09/09/2024 10 7 - 16 mmol/L Final     eGFR   Date Value Ref Range Status   09/09/2024 100 >=60 mL/min/1.73m2 Final     Procedures   Assessment and Plan (including Health  Maintenance)   :    Plan:     Problem List Items Addressed This Visit          Renal/    Cystitis with hematuria - Primary    Current Assessment & Plan     Symptoms consistent with UTI. UA showed hematuria but no bacteria or nitrites. Will culture urine and follow up with results. Macrobid as ordered. Increase fluid intake.          Relevant Medications    nitrofurantoin, macrocrystal-monohydrate, (MACROBID) 100 MG capsule    Other Relevant Orders    POCT URINALYSIS W/O SCOPE (Completed)       Health Maintenance Topics with due status: Not Due       Topic Last Completion Date    Colorectal Cancer Screening 12/14/2015    Hemoglobin A1c (Diabetic Prevention Screening) 12/22/2022    Cervical Cancer Screening 02/23/2023    Mammogram 08/06/2024    Lipid Panel 09/09/2024       Future Appointments   Date Time Provider Department Center   9/16/2024  3:00 PM Emily Mendoza FNP [facility]   3/10/2025  8:00 AM Zahida Blanchard NP [facility]   9/18/2025  2:40 PM [facility] MAMMO1 [facility]        Health Maintenance Due   Topic Date Due    Hepatitis C Screening  Never done    Pneumococcal Vaccines (Age 0-64) (1 of 2 - PCV) Never done    HIV Screening  Never done    TETANUS VACCINE  Never done    Shingles Vaccine (1 of 2) Never done    Influenza Vaccine (1) 09/01/2024    COVID-19 Vaccine (1 - 2023-24 season) Never done    RSV Vaccine (Age 60+ and Pregnant patients) (1 - 1-dose 60+ series) Never done          Signature:  Zahida Blanchard NP  [address]  [address]    Date of encounter: 9/13/24

## 2024-09-13 NOTE — ASSESSMENT & PLAN NOTE
Symptoms consistent with UTI. UA showed hematuria but no bacteria or nitrites. Will culture urine and follow up with results. Macrobid as ordered. Increase fluid intake.

## 2024-09-16 ENCOUNTER — OFFICE VISIT (OUTPATIENT)
Dept: GASTROENTEROLOGY | Facility: CLINIC | Age: 59
End: 2024-09-16
Payer: COMMERCIAL

## 2024-09-16 VITALS
BODY MASS INDEX: 39.85 KG/M2 | SYSTOLIC BLOOD PRESSURE: 128 MMHG | HEIGHT: 65 IN | OXYGEN SATURATION: 99 % | WEIGHT: 239.19 LBS | DIASTOLIC BLOOD PRESSURE: 70 MMHG | HEART RATE: 67 BPM

## 2024-09-16 DIAGNOSIS — K76.0 FATTY LIVER: Primary | ICD-10-CM

## 2024-09-16 PROCEDURE — 99999 PR PBB SHADOW E&M-EST. PATIENT-LVL IV: CPT | Mod: PBBFAC,,, | Performed by: NURSE PRACTITIONER

## 2024-09-16 PROCEDURE — 1159F MED LIST DOCD IN RCRD: CPT | Mod: CPTII,,, | Performed by: NURSE PRACTITIONER

## 2024-09-16 PROCEDURE — 4010F ACE/ARB THERAPY RXD/TAKEN: CPT | Mod: CPTII,,, | Performed by: NURSE PRACTITIONER

## 2024-09-16 PROCEDURE — 3078F DIAST BP <80 MM HG: CPT | Mod: CPTII,,, | Performed by: NURSE PRACTITIONER

## 2024-09-16 PROCEDURE — 99214 OFFICE O/P EST MOD 30 MIN: CPT | Mod: PBBFAC | Performed by: NURSE PRACTITIONER

## 2024-09-16 PROCEDURE — 3008F BODY MASS INDEX DOCD: CPT | Mod: CPTII,,, | Performed by: NURSE PRACTITIONER

## 2024-09-16 PROCEDURE — 99214 OFFICE O/P EST MOD 30 MIN: CPT | Mod: S$PBB,,, | Performed by: NURSE PRACTITIONER

## 2024-09-16 PROCEDURE — 3074F SYST BP LT 130 MM HG: CPT | Mod: CPTII,,, | Performed by: NURSE PRACTITIONER

## 2024-09-16 NOTE — PROGRESS NOTES
Mally Woods is a 59 y.o. female here for Follow-up (No problems, needs to schedule colonoscopy )        PCP: Zahida Blanchard  Referring Provider: Emily Mendoza, Moy  [address] [place] [address] - [place] [phone]     HPI:  Presents for six-month follow-up due to fatty liver.  Patient had liver ultrasound on 03/07/2024, mild fatty liver.  Liver elastography in August of 2023 showed a Metavir score of F0.  Labs from 09/09/2024 reviewed, total cholesterol 207, triglycerides are normal, no elevation of liver enzymes.  Reports that she has been trying hard to improve her diet choices.  States that she is avoiding fast food and high fatty food.  Is trying to exercise but not during the week.  In review of weights, weight is increased 10 lb since 4/24/24.  Encouraged patient to reduce portion sizes.  I will give her a copy of the Mediterranean diet today.  We will repeat liver elastography.  The last EGD dilation was 05/26/2023, 2 cm hiatal hernia.  No report of dysphagia.  Last colonoscopy was 12/14/2015, recommendation to repeat in 10 years.  She will be due for colonoscopy in December of 2025.    Follow-up  Pertinent negatives include no abdominal pain, change in bowel habit, chest pain, fatigue, nausea or vomiting.         ROS:  Review of Systems   Constitutional:  Negative for appetite change and fatigue.   HENT:  Negative for trouble swallowing.    Respiratory:  Negative for shortness of breath.    Cardiovascular:  Negative for chest pain.   Gastrointestinal:  Positive for reflux. Negative for abdominal pain, blood in stool, change in bowel habit, constipation, diarrhea, nausea and vomiting.   Musculoskeletal:  Negative for gait problem.   Integumentary:  Negative for pallor.   Psychiatric/Behavioral:  The patient is not nervous/anxious.           PMHX:  has a past medical history of Cervical disc disorder, Hiatal hernia with GERD, Hyperlipidemia, Hypertension, Morbid obesity with BMI of  40.0-44.9, adult, Nonalcoholic fatty liver disease, Stroke, Thoracic aortic aneurysm, Unspecified osteoarthritis, unspecified site, and Vitamin D deficiency (12/22/2022).    PSHX:  has a past surgical history that includes Cholecystectomy; Carpal tunnel release (12/21/2017); Total Reduction Mammoplasty; Cardiac catheterization (Left, 07/31/2008); Dilation and curettage of uterus; Colonoscopy; egd, with balloon dilation (2018); Diagnostic laparoscopy; and Left heart catheterization (05/25/2023).    PFHX: family history includes Cervical cancer in her mother; Dementia in her father; Diabetes in her mother; Glaucoma in her mother; Hyperlipidemia in her father and mother; Hypertension in her father and mother; No Known Problems in her sister; Pancreatic cancer in her mother.    PSlHX:  reports that she has never smoked. She has been exposed to tobacco smoke. She has never used smokeless tobacco. She reports that she does not drink alcohol and does not use drugs.        Review of patient's allergies indicates:   Allergen Reactions    Penicillins Hives and Rash     Can take rocephin  Other reaction(s): > 10 years ago  Can take rocephin      Celecoxib     Ciprofloxacin      States she can not take them due to aneurism     Fentanyl Other (See Comments)     Extreme sedation  Other reaction(s): Other (See Comments)  Extreme sedation  Extreme sedation      Hydrochlorothiazide Other (See Comments)     Reaction unknown  Other reaction(s): Other (See Comments)  Reaction unknown  Reaction unknown    Promethazine hcl Other (See Comments)     Hallucinations    Quinolones Other (See Comments)    Betamethasone Other (See Comments) and Rash     Flushing  Flushing  Flushing    Hydromorphone hcl Palpitations    Latex Rash    Morphine Palpitations    Sulfa (sulfonamide antibiotics) Itching       Medication List with Changes/Refills   Current Medications    CYANOCOBALAMIN (VITAMIN B-12) 1000 MCG TABLET    1 tablet.    ERGOCALCIFEROL  "(ERGOCALCIFEROL) 50,000 UNIT CAP    TAKE 1 CAPSULE BY MOUTH TWICE WEEKLY AS DIRECTED    LISINOPRIL (PRINIVIL,ZESTRIL) 2.5 MG TABLET    Take 1 tablet (2.5 mg total) by mouth once daily.    LISINOPRIL (PRINIVIL,ZESTRIL) 5 MG TABLET    Take 1 tablet (5 mg total) by mouth once daily.    MECOBALAMIN, VITAMIN B12, 1,000 MCG TBDL    DISSOLVE 1 TABLET on the tongue ONCE DAILY    NITROFURANTOIN, MACROCRYSTAL-MONOHYDRATE, (MACROBID) 100 MG CAPSULE    Take 1 capsule (100 mg total) by mouth 2 (two) times daily.    VIT C-ASCORBATE CA-ASCORB SOD (VITAMIN C) 500 MG/15 ML LIQD    as directed        Objective Findings:  Vital Signs:  /70   Pulse 67   Ht 5' 5" (1.651 m)   Wt 108.5 kg (239 lb 3.2 oz)   LMP  (LMP Unknown)   SpO2 99%   BMI 39.80 kg/m²  Body mass index is 39.8 kg/m².    Physical Exam:  Physical Exam  Vitals and nursing note reviewed.   Constitutional:       General: She is not in acute distress.     Appearance: Normal appearance. She is obese.   HENT:      Mouth/Throat:      Mouth: Mucous membranes are moist.   Cardiovascular:      Rate and Rhythm: Normal rate.   Pulmonary:      Breath sounds: No wheezing, rhonchi or rales.   Abdominal:      General: Bowel sounds are normal. There is no distension.      Palpations: Abdomen is soft. There is no mass.      Tenderness: There is no abdominal tenderness.      Hernia: No hernia is present.   Skin:     General: Skin is warm and dry.      Coloration: Skin is not jaundiced or pale.   Neurological:      Mental Status: She is alert and oriented to person, place, and time.   Psychiatric:         Mood and Affect: Mood normal.          Labs:  Lab Results   Component Value Date    WBC 10.30 04/08/2024    HGB 12.4 04/08/2024    HCT 37.6 (L) 04/08/2024    MCV 91.5 04/08/2024    RDW 12.4 04/08/2024     04/08/2024    LYMPH 11.4 (L) 04/08/2024    LYMPH 1.17 04/08/2024    MONO 5.9 04/08/2024    EOS 0.12 04/08/2024    BASO 0.04 04/08/2024     Lab Results   Component Value " Date     09/09/2024    K 4.3 09/09/2024     (H) 09/09/2024    CO2 27 09/09/2024    GLU 97 09/09/2024    BUN 14 09/09/2024    CREATININE 0.70 09/09/2024    CALCIUM 9.1 09/09/2024    PROT 6.8 09/09/2024    ALBUMIN 3.8 09/09/2024    BILITOT 0.3 09/09/2024    ALKPHOS 89 09/09/2024    AST 14 (L) 09/09/2024    ALT 25 09/09/2024         Imaging: No results found.      Assessment:  Mally Woods is a 59 y.o. female here with:  1. Fatty liver          Recommendations:  1. Liver elastography with imaging  2. Mediterranean diet  3. Exercise 150 minutes per week  Weight loss of 7-10%. Weight loss should be gradual  Diet low in saturated fats and carbohydrates, Mediterranean  Good glucose and cholesterol control  4. Will be due for colonoscopy December 2025      Follow up in about 6 months (around 3/16/2025).      Order summary:  Orders Placed This Encounter    US Elastography Liver w/imaging       Thank you for allowing me to participate in the care of Mally Woosd.      JOSE Melgar

## 2024-09-17 ENCOUNTER — HOSPITAL ENCOUNTER (EMERGENCY)
Facility: HOSPITAL | Age: 59
Discharge: HOME | End: 2024-09-17
Payer: COMMERCIAL

## 2024-09-17 VITALS
RESPIRATION RATE: 18 BRPM | DIASTOLIC BLOOD PRESSURE: 76 MMHG | SYSTOLIC BLOOD PRESSURE: 135 MMHG | TEMPERATURE: 98 F | BODY MASS INDEX: 39.32 KG/M2 | OXYGEN SATURATION: 99 % | HEART RATE: 56 BPM | HEIGHT: 65 IN | WEIGHT: 236 LBS

## 2024-09-17 DIAGNOSIS — M54.9 UPPER BACK PAIN ON RIGHT SIDE: ICD-10-CM

## 2024-09-17 DIAGNOSIS — R10.9 ABDOMINAL PAIN, UNSPECIFIED ABDOMINAL LOCATION: Primary | ICD-10-CM

## 2024-09-17 LAB
ALBUMIN SERPL BCP-MCNC: 3.7 G/DL (ref 3.5–5)
ALBUMIN/GLOB SERPL: 1.1 {RATIO}
ALP SERPL-CCNC: 91 U/L (ref 50–130)
ALT SERPL W P-5'-P-CCNC: 22 U/L (ref 13–56)
ANION GAP SERPL CALCULATED.3IONS-SCNC: 9 MMOL/L (ref 7–16)
APTT PPP: 30.9 SECONDS (ref 25.2–37.3)
AST SERPL W P-5'-P-CCNC: 15 U/L (ref 15–37)
BASOPHILS # BLD AUTO: 0.05 K/UL (ref 0–0.2)
BASOPHILS NFR BLD AUTO: 0.9 % (ref 0–1)
BILIRUB SERPL-MCNC: 0.4 MG/DL (ref ?–1.2)
BILIRUB UR QL STRIP: NEGATIVE
BUN SERPL-MCNC: 16 MG/DL (ref 7–18)
BUN/CREAT SERPL: 24 (ref 6–20)
CALCIUM SERPL-MCNC: 9.2 MG/DL (ref 8.5–10.1)
CHLORIDE SERPL-SCNC: 108 MMOL/L (ref 98–107)
CLARITY UR: CLEAR
CO2 SERPL-SCNC: 30 MMOL/L (ref 21–32)
COLOR UR: COLORLESS
CREAT SERPL-MCNC: 0.68 MG/DL (ref 0.55–1.02)
DIFFERENTIAL METHOD BLD: ABNORMAL
EGFR (NO RACE VARIABLE) (RUSH/TITUS): 100 ML/MIN/1.73M2
EOSINOPHIL # BLD AUTO: 0.15 K/UL (ref 0–0.5)
EOSINOPHIL NFR BLD AUTO: 2.6 % (ref 1–4)
ERYTHROCYTE [DISTWIDTH] IN BLOOD BY AUTOMATED COUNT: 12.2 % (ref 11.5–14.5)
GLOBULIN SER-MCNC: 3.4 G/DL (ref 2–4)
GLUCOSE SERPL-MCNC: 102 MG/DL (ref 74–106)
GLUCOSE UR STRIP-MCNC: NORMAL MG/DL
HCT VFR BLD AUTO: 39.4 % (ref 38–47)
HGB BLD-MCNC: 12.9 G/DL (ref 12–16)
IMM GRANULOCYTES # BLD AUTO: 0.01 K/UL (ref 0–0.04)
IMM GRANULOCYTES NFR BLD: 0.2 % (ref 0–0.4)
INR BLD: 0.95
KETONES UR STRIP-SCNC: NEGATIVE MG/DL
LEUKOCYTE ESTERASE UR QL STRIP: NEGATIVE
LYMPHOCYTES # BLD AUTO: 1.54 K/UL (ref 1–4.8)
LYMPHOCYTES NFR BLD AUTO: 26.8 % (ref 27–41)
MAGNESIUM SERPL-MCNC: 1.9 MG/DL (ref 1.7–2.3)
MCH RBC QN AUTO: 29.3 PG (ref 27–31)
MCHC RBC AUTO-ENTMCNC: 32.7 G/DL (ref 32–36)
MCV RBC AUTO: 89.5 FL (ref 80–96)
MONOCYTES # BLD AUTO: 0.48 K/UL (ref 0–0.8)
MONOCYTES NFR BLD AUTO: 8.3 % (ref 2–6)
MPC BLD CALC-MCNC: 11 FL (ref 9.4–12.4)
NEUTROPHILS # BLD AUTO: 3.52 K/UL (ref 1.8–7.7)
NEUTROPHILS NFR BLD AUTO: 61.2 % (ref 53–65)
NITRITE UR QL STRIP: NEGATIVE
NRBC # BLD AUTO: 0 X10E3/UL
NRBC, AUTO (.00): 0 %
OHS QRS DURATION: 98 MS
OHS QTC CALCULATION: 394 MS
PH UR STRIP: 5 PH UNITS
PLATELET # BLD AUTO: 237 K/UL (ref 150–400)
POTASSIUM SERPL-SCNC: 4.3 MMOL/L (ref 3.5–5.1)
PROT SERPL-MCNC: 7.1 G/DL (ref 6.4–8.2)
PROT UR QL STRIP: NEGATIVE
PROTHROMBIN TIME: 12.6 SECONDS (ref 11.7–14.7)
RBC # BLD AUTO: 4.4 M/UL (ref 4.2–5.4)
RBC # UR STRIP: NEGATIVE /UL
SODIUM SERPL-SCNC: 143 MMOL/L (ref 136–145)
SP GR UR STRIP: 1.01
TROPONIN I SERPL DL<=0.01 NG/ML-MCNC: <4 PG/ML
TROPONIN I SERPL DL<=0.01 NG/ML-MCNC: <4 PG/ML
UROBILINOGEN UR STRIP-ACNC: NORMAL MG/DL
WBC # BLD AUTO: 5.75 K/UL (ref 4.5–11)

## 2024-09-17 PROCEDURE — 83735 ASSAY OF MAGNESIUM: CPT | Performed by: NURSE PRACTITIONER

## 2024-09-17 PROCEDURE — 85610 PROTHROMBIN TIME: CPT | Performed by: NURSE PRACTITIONER

## 2024-09-17 PROCEDURE — 81003 URINALYSIS AUTO W/O SCOPE: CPT | Performed by: NURSE PRACTITIONER

## 2024-09-17 PROCEDURE — 85730 THROMBOPLASTIN TIME PARTIAL: CPT | Performed by: NURSE PRACTITIONER

## 2024-09-17 PROCEDURE — 84484 ASSAY OF TROPONIN QUANT: CPT | Performed by: NURSE PRACTITIONER

## 2024-09-17 PROCEDURE — 99285 EMERGENCY DEPT VISIT HI MDM: CPT | Mod: 25

## 2024-09-17 PROCEDURE — 36415 COLL VENOUS BLD VENIPUNCTURE: CPT | Performed by: NURSE PRACTITIONER

## 2024-09-17 PROCEDURE — 80053 COMPREHEN METABOLIC PANEL: CPT | Performed by: NURSE PRACTITIONER

## 2024-09-17 PROCEDURE — 93005 ELECTROCARDIOGRAM TRACING: CPT

## 2024-09-17 PROCEDURE — 85025 COMPLETE CBC W/AUTO DIFF WBC: CPT | Performed by: NURSE PRACTITIONER

## 2024-09-17 PROCEDURE — 93010 ELECTROCARDIOGRAM REPORT: CPT | Mod: ,,, | Performed by: INTERNAL MEDICINE

## 2024-09-17 RX ORDER — METHOCARBAMOL 500 MG/1
1000 TABLET, FILM COATED ORAL 3 TIMES DAILY
Qty: 30 TABLET | Refills: 0 | Status: SHIPPED | OUTPATIENT
Start: 2024-09-17 | End: 2024-09-22

## 2024-09-17 NOTE — ED PROVIDER NOTES
"Encounter Date: 9/17/2024       History     Chief Complaint   Patient presents with    Abdominal Pain    Back Pain     Pt presents to ED via POV with c/o abdominal pain and back pain that radiates to legs. Pt reports she thinks she might have a kidney infection.     59-year-old female presents to the emergency department to be evaluated for abdominal discomfort and back pain.  She reports she was evaluated in clinic a few days ago and treated for urinary tract infection.  She has had no improvement in her symptoms.  Denies any recent fall or injury.  She said that she did  a lot of trash 2 days ago with a "Grabber." Denies any dysuria, fever, chills, nausea, vomiting, diarrhea, constipation.  She said that she feels very bloated in her abdominal area.  She said that she recently started eating pickled beats.  She also a cabbage for dinner last night.    The history is provided by the patient.     Review of patient's allergies indicates:   Allergen Reactions    Penicillins Hives and Rash     Can take rocephin  Other reaction(s): > 10 years ago  Can take rocephin      Celecoxib     Ciprofloxacin      States she can not take them due to aneurism     Fentanyl Other (See Comments)     Extreme sedation  Other reaction(s): Other (See Comments)  Extreme sedation  Extreme sedation      Hydrochlorothiazide Other (See Comments)     Reaction unknown  Other reaction(s): Other (See Comments)  Reaction unknown  Reaction unknown    Promethazine hcl Other (See Comments)     Hallucinations    Quinolones Other (See Comments)    Betamethasone Other (See Comments) and Rash     Flushing  Flushing  Flushing    Hydromorphone hcl Palpitations    Latex Rash    Morphine Palpitations    Sulfa (sulfonamide antibiotics) Itching     Past Medical History:   Diagnosis Date    Cervical disc disorder     Hiatal hernia with GERD     Hyperlipidemia     Hypertension     Morbid obesity with BMI of 40.0-44.9, adult     Nonalcoholic fatty liver " disease     Stroke     Thoracic aortic aneurysm     Unspecified osteoarthritis, unspecified site     Vitamin D deficiency 12/22/2022     Past Surgical History:   Procedure Laterality Date    CARDIAC CATHETERIZATION Left 07/31/2008    Performed by Dr. Bruce Cramer    CARPAL TUNNEL RELEASE  12/21/2017    Dr. Moran with cyst removed from left middle finger    CHOLECYSTECTOMY      COLONOSCOPY      DIAGNOSTIC LAPAROSCOPY      DILATION AND CURETTAGE OF UTERUS      EGD, WITH BALLOON DILATION  2018    Performed by Dr. Marc Lomax    LEFT HEART CATHETERIZATION  05/25/2023    TOTAL REDUCTION MAMMOPLASTY       Family History   Problem Relation Name Age of Onset    Hyperlipidemia Mother      Hypertension Mother      Glaucoma Mother      Diabetes Mother      Cervical cancer Mother      Pancreatic cancer Mother      Hyperlipidemia Father      Hypertension Father      Dementia Father      No Known Problems Sister       Social History     Tobacco Use    Smoking status: Never     Passive exposure: Past    Smokeless tobacco: Never   Substance Use Topics    Alcohol use: Never    Drug use: Never     Review of Systems   Constitutional:  Negative for chills and fever.   Respiratory:  Negative for cough, chest tightness and shortness of breath.    Cardiovascular:  Negative for chest pain and leg swelling.   Gastrointestinal:  Negative for constipation, diarrhea, nausea, rectal pain and vomiting.        Abdominal discomfort   Genitourinary:  Negative for dysuria.   Musculoskeletal:  Positive for back pain.   All other systems reviewed and are negative.      Physical Exam     Initial Vitals [09/17/24 1036]   BP Pulse Resp Temp SpO2   134/79 60 18 98.2 °F (36.8 °C) 98 %      MAP       --         Physical Exam    Vitals reviewed.  Constitutional: She appears well-developed and well-nourished.   Neck: Neck supple.   Cardiovascular:  Normal rate and regular rhythm.           Pulmonary/Chest: Breath sounds normal.   Abdominal: Abdomen is  soft. Bowel sounds are normal. She exhibits no distension and no mass. There is no abdominal tenderness. There is no rebound and no guarding.   Musculoskeletal:         General: Normal range of motion.      Cervical back: Normal and neck supple.      Thoracic back: Normal.      Lumbar back: Tenderness and bony tenderness present. No swelling, edema, deformity, signs of trauma, lacerations or spasms. Normal range of motion. No scoliosis.     Neurological: She is alert and oriented to person, place, and time. She has normal strength. GCS score is 15. GCS eye subscore is 4. GCS verbal subscore is 5. GCS motor subscore is 6.   Skin: Skin is warm and dry. Capillary refill takes less than 2 seconds.   Psychiatric: She has a normal mood and affect.         Medical Screening Exam   See Full Note    ED Course   Procedures  Labs Reviewed   COMPREHENSIVE METABOLIC PANEL - Abnormal       Result Value    Sodium 143      Potassium 4.3      Chloride 108 (*)     CO2 30      Anion Gap 9      Glucose 102      BUN 16      Creatinine 0.68      BUN/Creatinine Ratio 24 (*)     Calcium 9.2      Total Protein 7.1      Albumin 3.7      Globulin 3.4      A/G Ratio 1.1      Bilirubin, Total 0.4      Alk Phos 91      ALT 22      AST 15      eGFR 100     CBC WITH DIFFERENTIAL - Abnormal    WBC 5.75      RBC 4.40      Hemoglobin 12.9      Hematocrit 39.4      MCV 89.5      MCH 29.3      MCHC 32.7      RDW 12.2      Platelet Count 237      MPV 11.0      Neutrophils % 61.2      Lymphocytes % 26.8 (*)     Monocytes % 8.3 (*)     Eosinophils % 2.6      Basophils % 0.9      Immature Granulocytes % 0.2      nRBC, Auto 0.0      Neutrophils, Abs 3.52      Lymphocytes, Absolute 1.54      Monocytes, Absolute 0.48      Eosinophils, Absolute 0.15      Basophils, Absolute 0.05      Immature Granulocytes, Absolute 0.01      nRBC, Absolute 0.00      Diff Type Auto     TROPONIN I - Normal    Troponin I High Sensitivity <4.0     PROTIME-INR - Normal    PT 12.6       INR 0.95     APTT - Normal    PTT 30.9     MAGNESIUM - Normal    Magnesium 1.9     TROPONIN I - Normal    Troponin I High Sensitivity <4.0     CBC W/ AUTO DIFFERENTIAL    Narrative:     The following orders were created for panel order CBC Auto Differential.  Procedure                               Abnormality         Status                     ---------                               -----------         ------                     CBC with Differential[[id number]]       Abnormal            Final result                 Please view results for these tests on the individual orders.   URINALYSIS    Color, UA Colorless      Clarity, UA Clear      pH, UA 5.0      Leukocytes, UA Negative      Nitrites, UA Negative      Protein, UA Negative      Glucose, UA Normal      Ketones, UA Negative      Urobilinogen, UA Normal      Bilirubin, UA Negative      Blood, UA Negative      Specific Gravity, UA 1.007            Imaging Results              XR ABDOMEN, ACUTE 2 OR MORE VIEWS WITH CHEST (Final result)  Result time 09/17/24 12:09:53      Final result by Po Kaur MD (09/17/24 12:09:53)                   Impression:      No acute findings evident the chest or abdomen.      Electronically signed by: Po Kaur  Date:    09/17/2024  Time:    12:09               Narrative:    EXAMINATION:  XR ABDOMEN ACUTE 2 OR MORE VIEWS WITH CHEST    CLINICAL HISTORY:  abdominal pain;    TECHNIQUE:  Single-view of the chest with flat erect abdomen.    COMPARISON:  None    FINDINGS:  The heart is not enlarged the trachea is midline. Prominence of the interstitium with no consolidation or effusion.  Cholecystectomy clips are noted. Bowel gas pattern is not unusual. Mild spondylosis especially at the lower lumbar segments with Castellvi 2 B configuration of the lumbo sacral junction.                                       Medications - No data to display  Medical Decision Making  59-year-old female presents to the emergency  "department to be evaluated for abdominal discomfort and back pain.  She reports she was evaluated in clinic a few days ago and treated for urinary tract infection.  She has had no improvement in her symptoms.  Denies any recent fall or injury.  She said that she did  a lot of trash 2 days ago with a "Grabber." Denies any dysuria, fever, chills, nausea, vomiting, diarrhea, constipation.  She said that she feels very bloated in her abdominal area.  She said that she recently started eating pickled beats.  She also a cabbage for dinner last night.  EKG ordered, reviewed by Dr. Reyes, significant for sinus bradycardia, rate is 51, no STEMI  Labs ordered and reviewed  X-rays ordered, films reviewed as well as the radiologist's interpretation significant for no acute process  Diagnosis: Abdominal discomfort, back pain  Prescribed Robaxin    Amount and/or Complexity of Data Reviewed  Labs: ordered.  Radiology: ordered.    Risk  Prescription drug management.                                      Clinical Impression:   Final diagnoses:  [M54.9] Upper back pain on right side  [R10.9] Abdominal pain, unspecified abdominal location (Primary)        ED Disposition Condition    Discharge Stable          ED Prescriptions       Medication Sig Dispense Start Date End Date Auth. Provider    methocarbamoL (ROBAXIN) 500 MG Tab Take 2 tablets (1,000 mg total) by mouth 3 (three) times daily. for 5 days 30 tablet 9/17/2024 9/22/2024 Gely Degroot FNP          Follow-up Information    None          Gely Degroot, MARY  09/17/24 1452       Gely Degroot FNP  09/17/24 1453    "

## 2024-09-17 NOTE — ED TRIAGE NOTES
Chief Complaint   Patient presents with    Abdominal Pain    Back Pain     Pt presents to ED via POV with c/o abdominal pain and back pain that radiates to legs. Pt reports she thinks she might have a kidney infection.

## 2024-09-18 ENCOUNTER — TELEPHONE (OUTPATIENT)
Dept: EMERGENCY MEDICINE | Facility: HOSPITAL | Age: 59
End: 2024-09-18
Payer: COMMERCIAL

## 2024-09-20 ENCOUNTER — HOSPITAL ENCOUNTER (OUTPATIENT)
Dept: RADIOLOGY | Facility: HOSPITAL | Age: 59
Discharge: HOME | End: 2024-09-20
Attending: NURSE PRACTITIONER
Payer: COMMERCIAL

## 2024-09-20 DIAGNOSIS — K76.0 FATTY LIVER: ICD-10-CM

## 2024-09-20 PROCEDURE — 76981 USE PARENCHYMA: CPT | Mod: 26,,, | Performed by: STUDENT IN AN ORGANIZED HEALTH CARE EDUCATION/TRAINING PROGRAM

## 2024-09-20 PROCEDURE — 76981 USE PARENCHYMA: CPT | Mod: TC

## 2024-12-24 ENCOUNTER — OFFICE VISIT (OUTPATIENT)
Dept: FAMILY MEDICINE | Facility: CLINIC | Age: 59
End: 2024-12-24
Payer: COMMERCIAL

## 2024-12-24 VITALS
HEIGHT: 65 IN | OXYGEN SATURATION: 98 % | DIASTOLIC BLOOD PRESSURE: 84 MMHG | SYSTOLIC BLOOD PRESSURE: 126 MMHG | HEART RATE: 62 BPM | WEIGHT: 248 LBS | BODY MASS INDEX: 41.32 KG/M2 | RESPIRATION RATE: 20 BRPM | TEMPERATURE: 98 F

## 2024-12-24 DIAGNOSIS — M25.552 LEFT HIP PAIN: Primary | ICD-10-CM

## 2024-12-24 PROCEDURE — 1160F RVW MEDS BY RX/DR IN RCRD: CPT | Mod: CPTII,,, | Performed by: NURSE PRACTITIONER

## 2024-12-24 PROCEDURE — 3079F DIAST BP 80-89 MM HG: CPT | Mod: CPTII,,, | Performed by: NURSE PRACTITIONER

## 2024-12-24 PROCEDURE — 99213 OFFICE O/P EST LOW 20 MIN: CPT | Mod: 25,,, | Performed by: NURSE PRACTITIONER

## 2024-12-24 PROCEDURE — 1159F MED LIST DOCD IN RCRD: CPT | Mod: CPTII,,, | Performed by: NURSE PRACTITIONER

## 2024-12-24 PROCEDURE — 3008F BODY MASS INDEX DOCD: CPT | Mod: CPTII,,, | Performed by: NURSE PRACTITIONER

## 2024-12-24 PROCEDURE — 3074F SYST BP LT 130 MM HG: CPT | Mod: CPTII,,, | Performed by: NURSE PRACTITIONER

## 2024-12-24 PROCEDURE — 96372 THER/PROPH/DIAG INJ SC/IM: CPT | Mod: ,,, | Performed by: NURSE PRACTITIONER

## 2024-12-24 PROCEDURE — 4010F ACE/ARB THERAPY RXD/TAKEN: CPT | Mod: CPTII,,, | Performed by: NURSE PRACTITIONER

## 2024-12-24 RX ORDER — METHYLPREDNISOLONE ACETATE 40 MG/ML
40 INJECTION, SUSPENSION INTRA-ARTICULAR; INTRALESIONAL; INTRAMUSCULAR; SOFT TISSUE
Status: DISCONTINUED | OUTPATIENT
Start: 2024-12-24 | End: 2024-12-24

## 2024-12-24 RX ORDER — KETOROLAC TROMETHAMINE 10 MG/1
10 TABLET, FILM COATED ORAL EVERY 6 HOURS
Qty: 20 TABLET | Refills: 0 | Status: SHIPPED | OUTPATIENT
Start: 2024-12-24 | End: 2024-12-29

## 2024-12-24 RX ORDER — DEXAMETHASONE SODIUM PHOSPHATE 4 MG/ML
4 INJECTION, SOLUTION INTRA-ARTICULAR; INTRALESIONAL; INTRAMUSCULAR; INTRAVENOUS; SOFT TISSUE
Status: COMPLETED | OUTPATIENT
Start: 2024-12-24 | End: 2024-12-24

## 2024-12-24 RX ADMIN — DEXAMETHASONE SODIUM PHOSPHATE 4 MG: 4 INJECTION, SOLUTION INTRA-ARTICULAR; INTRALESIONAL; INTRAMUSCULAR; INTRAVENOUS; SOFT TISSUE at 10:12

## 2024-12-24 NOTE — PROGRESS NOTES
Zahida Blanchard NP   [facility]  [address]  [place]  [address]      PATIENT NAME: Mally Woods  : 1965  DATE: 24  MRN: [id number]      Billing Provider: Zahida Blanchard NP  Level of Service: ID OFFICE/OUTPT VISIT, EST, LEVL III, 20-29 MIN  Patient PCP Information       Provider PCP Type    Zahida Blanchard NP General            Reason for Visit / Chief Complaint: Hip Pain (Patient presents to clinic for left hip pain ongoing for several days. Reports pain as stabbing. Pain is worse when walking with flat shoes or barefoot. She has to wear something with a little heel for some relief. She has tried heat/ice without relief. She denies any injury, or any radiating pain. )         History of Present Illness / Problem Focused Workflow     59 year old female presents to clinic with complaints of left hip pain ongoing for several days. Reports pain as stabbing. Pain is worse when walking with flat shoes or barefoot. She has to wear something with a little heel for some relief. She has tried heat/ice without relief. She denies any injury, or any radiating pain.             Review of Systems     @Review of Systems   Constitutional:  Negative for activity change, appetite change, fatigue and fever.   HENT:  Negative for nasal congestion, ear pain, rhinorrhea, sinus pressure/congestion and sore throat.    Eyes:  Negative for pain, redness, visual disturbance and eye dryness.   Respiratory:  Negative for cough and shortness of breath.    Cardiovascular:  Negative for chest pain and leg swelling.   Gastrointestinal:  Negative for abdominal distention, abdominal pain, constipation and diarrhea.   Endocrine: Negative for cold intolerance, heat intolerance and polyuria.   Genitourinary:  Negative for bladder incontinence, dysuria, frequency and urgency.   Musculoskeletal:  Positive for arthralgias. Negative for gait problem and myalgias.   Integumentary:  Negative for color change, rash  and wound.   Allergic/Immunologic: Negative for environmental allergies and food allergies.   Neurological:  Negative for dizziness, weakness, light-headedness and headaches.   Psychiatric/Behavioral:  Negative for behavioral problems and sleep disturbance.        Medical / Social / Family History     Past Medical History:   Diagnosis Date    Cervical disc disorder     Hiatal hernia with GERD     Hyperlipidemia     Hypertension     Morbid obesity with BMI of 40.0-44.9, adult     Nonalcoholic fatty liver disease     Stroke     Thoracic aortic aneurysm     Unspecified osteoarthritis, unspecified site     Vitamin D deficiency 12/22/2022       Past Surgical History:   Procedure Laterality Date    CARDIAC CATHETERIZATION Left 07/31/2008    Performed by Dr. Bruce Cramer    CARPAL TUNNEL RELEASE  12/21/2017    Dr. Moran with cyst removed from left middle finger    CHOLECYSTECTOMY      COLONOSCOPY      DIAGNOSTIC LAPAROSCOPY      DILATION AND CURETTAGE OF UTERUS      EGD, WITH BALLOON DILATION  2018    Performed by Dr. Marc Lomax    LEFT HEART CATHETERIZATION  05/25/2023    TOTAL REDUCTION MAMMOPLASTY         Medications and Allergies     Medications  Outpatient Medications Marked as Taking for the 12/24/24 encounter (Office Visit) with Zahida Blanchard, NP   Medication Sig Dispense Refill    ergocalciferol (ERGOCALCIFEROL) 50,000 unit Cap TAKE 1 CAPSULE BY MOUTH TWICE WEEKLY AS DIRECTED 24 capsule 1    lisinopriL (PRINIVIL,ZESTRIL) 2.5 MG tablet Take 1 tablet (2.5 mg total) by mouth once daily. 90 tablet 1    mecobalamin, vitamin B12, 1,000 mcg TbDL DISSOLVE 1 TABLET on the tongue ONCE DAILY 30 tablet 0    vit c-ascorbate Ca-ascorb sod (VITAMIN C) 500 mg/15 mL Liqd as directed       Current Facility-Administered Medications for the 12/24/24 encounter (Office Visit) with Zahida Blanchard NP   Medication Dose Route Frequency Provider Last Rate Last Admin    [COMPLETED] dexAMETHasone injection 4 mg  4 mg  Intramuscular 1 time in Clinic/HOD Zahida Blanchard NP   4 mg at 12/24/24 1005    [DISCONTINUED] methylPREDNISolone acetate injection 40 mg  40 mg Intramuscular 1 time in Clinic/HOD Zahida Blanchard NP           Allergies  Review of patient's allergies indicates:   Allergen Reactions    Penicillins Hives and Rash     Can take rocephin  Other reaction(s): > 10 years ago  Can take rocephin      Celecoxib     Ciprofloxacin      States she can not take them due to aneurism     Fentanyl Other (See Comments)     Extreme sedation  Other reaction(s): Other (See Comments)  Extreme sedation  Extreme sedation      Hydrochlorothiazide Other (See Comments)     Reaction unknown  Other reaction(s): Other (See Comments)  Reaction unknown  Reaction unknown    Promethazine hcl Other (See Comments)     Hallucinations    Quinolones Other (See Comments)    Betamethasone Other (See Comments) and Rash     Flushing  Flushing  Flushing    Hydromorphone hcl Palpitations    Latex Rash    Morphine Palpitations    Sulfa (sulfonamide antibiotics) Itching       Physical Examination     Vitals:    12/24/24 0844   BP: 126/84   Pulse: 62   Resp: 20   Temp: 98 °F (36.7 °C)     Physical Exam  Vitals and nursing note reviewed.   HENT:      Head: Normocephalic.      Nose: Nose normal.      Mouth/Throat:      Mouth: Mucous membranes are moist.      Pharynx: Oropharynx is clear. No posterior oropharyngeal erythema.   Eyes:      Conjunctiva/sclera: Conjunctivae normal.   Cardiovascular:      Rate and Rhythm: Normal rate and regular rhythm.      Pulses: Normal pulses.      Heart sounds: Normal heart sounds.   Pulmonary:      Effort: Pulmonary effort is normal.      Breath sounds: Normal breath sounds.   Abdominal:      General: Abdomen is flat. Bowel sounds are normal. There is no distension.      Palpations: Abdomen is soft.   Musculoskeletal:         General: No swelling.      Cervical back: Normal range of motion.      Left hip: Tenderness and bony  tenderness present. Decreased range of motion.      Right lower leg: No edema.      Left lower leg: No edema.   Skin:     General: Skin is warm and dry.      Capillary Refill: Capillary refill takes less than 2 seconds.   Neurological:      Mental Status: She is alert. Mental status is at baseline.   Psychiatric:         Mood and Affect: Mood normal.         Behavior: Behavior normal.               Lab Results   Component Value Date    WBC 5.75 09/17/2024    HGB 12.9 09/17/2024    HCT 39.4 09/17/2024    MCV 89.5 09/17/2024     09/17/2024        CMP  Sodium   Date Value Ref Range Status   09/17/2024 143 136 - 145 mmol/L Final     Potassium   Date Value Ref Range Status   09/17/2024 4.3 3.5 - 5.1 mmol/L Final     Chloride   Date Value Ref Range Status   09/17/2024 108 (H) 98 - 107 mmol/L Final     CO2   Date Value Ref Range Status   09/17/2024 30 21 - 32 mmol/L Final     Glucose   Date Value Ref Range Status   09/17/2024 102 74 - 106 mg/dL Final     BUN   Date Value Ref Range Status   09/17/2024 16 7 - 18 mg/dL Final     Creatinine   Date Value Ref Range Status   09/17/2024 0.68 0.55 - 1.02 mg/dL Final     Calcium   Date Value Ref Range Status   09/17/2024 9.2 8.5 - 10.1 mg/dL Final     Total Protein   Date Value Ref Range Status   09/17/2024 7.1 6.4 - 8.2 g/dL Final     Albumin   Date Value Ref Range Status   09/17/2024 3.7 3.5 - 5.0 g/dL Final     Bilirubin, Total   Date Value Ref Range Status   09/17/2024 0.4 >0.0 - 1.2 mg/dL Final     Alk Phos   Date Value Ref Range Status   09/17/2024 91 50 - 130 U/L Final     AST   Date Value Ref Range Status   09/17/2024 15 15 - 37 U/L Final     ALT   Date Value Ref Range Status   09/17/2024 22 13 - 56 U/L Final     Anion Gap   Date Value Ref Range Status   09/17/2024 9 7 - 16 mmol/L Final     eGFR   Date Value Ref Range Status   09/17/2024 100 >=60 mL/min/1.73m2 Final     Procedures   Assessment and Plan (including Health Maintenance)   :    Plan:     Problem List Items  Addressed This Visit          Orthopedic    Left hip pain - Primary    Current Assessment & Plan     Xray read as normal. I see some possible degenerative changes and will treat as such. Decadron IM given in clinic. Toradol oral as precribed. Encouraged to rest, ice area. If symptoms persist or worsen will send for additional imaging.          Relevant Orders    X-Ray Hip 2 or 3 views Left with Pelvis when performed (Completed)       Health Maintenance Topics with due status: Not Due       Topic Last Completion Date    Colorectal Cancer Screening 12/14/2015    Hemoglobin A1c (Diabetic Prevention Screening) 12/22/2022    Cervical Cancer Screening 02/23/2023    Mammogram 08/06/2024    Lipid Panel 09/09/2024       Future Appointments   Date Time Provider Department Center   3/10/2025  8:00 AM Zahida Blanchard NP [facility] FAMMED [place] [facility]   3/17/2025  1:00 PM Emily Mendoza FNP [facility] GASTR [facility]   9/18/2025  2:40 PM [facility] MAMMO1 [facility]        Health Maintenance Due   Topic Date Due    Hepatitis C Screening  Never done    HIV Screening  Never done    TETANUS VACCINE  Never done    Pneumococcal Vaccines (Age 50+) (1 of 2 - PCV) Never done    Shingles Vaccine (1 of 2) Never done    Influenza Vaccine (1) 09/01/2024    COVID-19 Vaccine (1 - 2024-25 season) Never done    RSV Vaccine (Age 60+ and Pregnant patients) (1 - Risk 60-74 years 1-dose series) Never done          Signature:  Zahida Blanchard NP  [address] [place]  [address]    Date of encounter: 12/24/24

## 2024-12-24 NOTE — ASSESSMENT & PLAN NOTE
Xray read as normal. I see some possible degenerative changes and will treat as such. Decadron IM given in clinic. Toradol oral as precribed. Encouraged to rest, ice area. If symptoms persist or worsen will send for additional imaging.

## 2025-01-21 ENCOUNTER — OFFICE VISIT (OUTPATIENT)
Dept: FAMILY MEDICINE | Facility: CLINIC | Age: 60
End: 2025-01-21
Payer: COMMERCIAL

## 2025-01-21 VITALS
DIASTOLIC BLOOD PRESSURE: 88 MMHG | TEMPERATURE: 99 F | HEIGHT: 65 IN | WEIGHT: 248 LBS | RESPIRATION RATE: 18 BRPM | BODY MASS INDEX: 41.32 KG/M2 | HEART RATE: 61 BPM | OXYGEN SATURATION: 98 % | SYSTOLIC BLOOD PRESSURE: 138 MMHG

## 2025-01-21 DIAGNOSIS — R52 GENERALIZED BODY ACHES: ICD-10-CM

## 2025-01-21 DIAGNOSIS — I10 HYPERTENSION, UNSPECIFIED TYPE: ICD-10-CM

## 2025-01-21 DIAGNOSIS — J02.9 SORE THROAT: ICD-10-CM

## 2025-01-21 DIAGNOSIS — R51.9 NONINTRACTABLE HEADACHE, UNSPECIFIED CHRONICITY PATTERN, UNSPECIFIED HEADACHE TYPE: ICD-10-CM

## 2025-01-21 DIAGNOSIS — J01.90 ACUTE BACTERIAL SINUSITIS: Primary | ICD-10-CM

## 2025-01-21 DIAGNOSIS — B96.89 ACUTE BACTERIAL SINUSITIS: Primary | ICD-10-CM

## 2025-01-21 DIAGNOSIS — R05.9 COUGH, UNSPECIFIED TYPE: ICD-10-CM

## 2025-01-21 LAB
CTP QC/QA: YES
MOLECULAR STREP A: NEGATIVE
POC MOLECULAR INFLUENZA A AGN: NEGATIVE
POC MOLECULAR INFLUENZA B AGN: NEGATIVE
SARS-COV-2 RDRP RESP QL NAA+PROBE: NEGATIVE

## 2025-01-21 PROCEDURE — 1160F RVW MEDS BY RX/DR IN RCRD: CPT | Mod: CPTII,,, | Performed by: NURSE PRACTITIONER

## 2025-01-21 PROCEDURE — 3079F DIAST BP 80-89 MM HG: CPT | Mod: CPTII,,, | Performed by: NURSE PRACTITIONER

## 2025-01-21 PROCEDURE — 87502 INFLUENZA DNA AMP PROBE: CPT | Mod: QW,,, | Performed by: NURSE PRACTITIONER

## 2025-01-21 PROCEDURE — 1159F MED LIST DOCD IN RCRD: CPT | Mod: CPTII,,, | Performed by: NURSE PRACTITIONER

## 2025-01-21 PROCEDURE — 3008F BODY MASS INDEX DOCD: CPT | Mod: CPTII,,, | Performed by: NURSE PRACTITIONER

## 2025-01-21 PROCEDURE — 99213 OFFICE O/P EST LOW 20 MIN: CPT | Mod: ,,, | Performed by: NURSE PRACTITIONER

## 2025-01-21 PROCEDURE — 87635 SARS-COV-2 COVID-19 AMP PRB: CPT | Mod: QW,,, | Performed by: NURSE PRACTITIONER

## 2025-01-21 PROCEDURE — 3075F SYST BP GE 130 - 139MM HG: CPT | Mod: CPTII,,, | Performed by: NURSE PRACTITIONER

## 2025-01-21 PROCEDURE — 87651 STREP A DNA AMP PROBE: CPT | Mod: QW,,, | Performed by: NURSE PRACTITIONER

## 2025-01-21 RX ORDER — AZITHROMYCIN 250 MG/1
TABLET, FILM COATED ORAL
Qty: 6 TABLET | Refills: 0 | Status: SHIPPED | OUTPATIENT
Start: 2025-01-21 | End: 2025-01-26

## 2025-01-21 NOTE — LETTER
January 21, 2025      [facility]  [address] [address] [place] [address]  Phone: [phone]  Fax: [phone]       Patient: Mally Woods   YOB: 1965  Date of Visit: 01/21/2025    To Whom It May Concern:    Moises Woods  was at [facility] on 01/21/2025. The patient may return to work/school on 1/22/25 with no restrictions. If you have any questions or concerns, or if I can be of further assistance, please do not hesitate to contact me.    Sincerely,    Zahida Blanchard NP

## 2025-01-21 NOTE — ASSESSMENT & PLAN NOTE
BP elevated today in clinic. States she has not taken her meds yet. Instructed to take as soon as she gets home.

## 2025-01-21 NOTE — ASSESSMENT & PLAN NOTE
Negative COVID, Flu, Strep.   Zithromax as ordered and Ed a hist as needed.    Reviewed pathology of current symptoms, medication side effects/risk/benefits/directions on taking medications, and worsening or persistent symptoms that require follow up in next 2-3 days. Saline/steroid nasal sprays, antihistamine use, increase fluid intake, and multivitamin/elderberry/Zinc use were recommended. May take Tylenol or Motrin as needed for pain and/or fever. Patient was instructed to take antibiotic as directed, complete entire course to avoid antibiotic resistance, and take OTC probiotic with antibiotic to prevent GI upset. Patient verbalized understanding of treatment plan and denies any questions.

## 2025-01-21 NOTE — PROGRESS NOTES
Zahida Blanchard NP   [place] [address]  [place]  [address]      PATIENT NAME: Mally Woods  : 1965  DATE: 25  MRN: [id number]      Billing Provider: Zahida Blanchard NP  Level of Service: [facility] OFFICE/OUTPT VISIT, EST, LEVL III, 20-29 MIN  Patient PCP Information       Provider PCP Type    Zahida Blanchard NP General            Reason for Visit / Chief Complaint: Cough (Patient is 59 year old female, presents to the clinic with cough x 4 days. ), Headache (Patient states headache x 1 day. ), Sore Throat (Patient states  sore throat x 3 days. ), and Generalized Body Aches (Patient states body aches x 3 days. )         History of Present Illness / Problem Focused Workflow     59 year old female presents to clinic with complaints of cough, headache, sore throat, and body aches.   Cough: Patient is 59 year old female, presents to the clinic with cough x 4 days.   Headache: Patient states headache x 1 day.   Sore Throat: Patient states  sore throat x 3 days.   Generalized Body Aches: Patient states body aches x 3 days.             Review of Systems     @Review of Systems   Constitutional:  Positive for fatigue. Negative for activity change, appetite change and fever.   HENT:  Positive for nasal congestion, rhinorrhea, sinus pressure/congestion and sore throat. Negative for ear pain.    Eyes:  Negative for pain, redness, visual disturbance and eye dryness.   Respiratory:  Positive for cough. Negative for shortness of breath.    Cardiovascular:  Negative for chest pain and leg swelling.   Gastrointestinal:  Negative for abdominal distention, abdominal pain, constipation and diarrhea.   Endocrine: Negative for cold intolerance, heat intolerance and polyuria.   Genitourinary:  Negative for bladder incontinence, dysuria, frequency and urgency.   Musculoskeletal:  Positive for myalgias. Negative for arthralgias and gait problem.   Integumentary:  Negative for color change, rash and  wound.   Allergic/Immunologic: Negative for environmental allergies and food allergies.   Neurological:  Positive for headaches. Negative for dizziness, weakness and light-headedness.   Psychiatric/Behavioral:  Negative for behavioral problems and sleep disturbance.        Medical / Social / Family History     Past Medical History:   Diagnosis Date    Cervical disc disorder     Hiatal hernia with GERD     Hyperlipidemia     Hypertension     Morbid obesity with BMI of 40.0-44.9, adult     Nonalcoholic fatty liver disease     Stroke     Thoracic aortic aneurysm     Unspecified osteoarthritis, unspecified site     Vitamin D deficiency 12/22/2022       Past Surgical History:   Procedure Laterality Date    CARDIAC CATHETERIZATION Left 07/31/2008    Performed by Dr. Bruce Cramer    CARPAL TUNNEL RELEASE  12/21/2017    Dr. Moran with cyst removed from left middle finger    CHOLECYSTECTOMY      COLONOSCOPY      DIAGNOSTIC LAPAROSCOPY      DILATION AND CURETTAGE OF UTERUS      EGD, WITH BALLOON DILATION  2018    Performed by Dr. Marc Lomax    LEFT HEART CATHETERIZATION  05/25/2023    TOTAL REDUCTION MAMMOPLASTY         Medications and Allergies     Medications  Outpatient Medications Marked as Taking for the 1/21/25 encounter (Office Visit) with Zahida Blanchard NP   Medication Sig Dispense Refill    cyanocobalamin (VITAMIN B-12) 1000 MCG tablet 1 tablet.      ergocalciferol (ERGOCALCIFEROL) 50,000 unit Cap TAKE 1 CAPSULE BY MOUTH TWICE WEEKLY AS DIRECTED 24 capsule 1    lisinopriL (PRINIVIL,ZESTRIL) 2.5 MG tablet Take 1 tablet (2.5 mg total) by mouth once daily. 90 tablet 1    lisinopriL (PRINIVIL,ZESTRIL) 5 MG tablet Take 1 tablet (5 mg total) by mouth once daily. 90 tablet 1    mecobalamin, vitamin B12, 1,000 mcg TbDL DISSOLVE 1 TABLET on the tongue ONCE DAILY 30 tablet 0    vit c-ascorbate Ca-ascorb sod (VITAMIN C) 500 mg/15 mL Liqd as directed         Allergies  Review of patient's allergies indicates:    Allergen Reactions    Penicillins Hives and Rash     Can take rocephin  Other reaction(s): > 10 years ago  Can take rocephin      Celecoxib     Ciprofloxacin      States she can not take them due to aneurism     Fentanyl Other (See Comments)     Extreme sedation  Other reaction(s): Other (See Comments)  Extreme sedation  Extreme sedation      Hydrochlorothiazide Other (See Comments)     Reaction unknown  Other reaction(s): Other (See Comments)  Reaction unknown  Reaction unknown    Promethazine hcl Other (See Comments)     Hallucinations    Quinolones Other (See Comments)    Betamethasone Other (See Comments) and Rash     Flushing  Flushing  Flushing    Hydromorphone hcl Palpitations    Latex Rash    Morphine Palpitations    Sulfa (sulfonamide antibiotics) Itching       Physical Examination     Vitals:    01/21/25 0915   BP: 138/88   Pulse:    Resp:    Temp:      Physical Exam  Vitals and nursing note reviewed.   Constitutional:       Appearance: Normal appearance.   HENT:      Head: Normocephalic.      Right Ear: Tympanic membrane normal.      Left Ear: Tympanic membrane normal.      Nose: Congestion and rhinorrhea present. Rhinorrhea is purulent.      Right Turbinates: Pale.      Left Turbinates: Pale.      Right Sinus: Maxillary sinus tenderness and frontal sinus tenderness present.      Left Sinus: Maxillary sinus tenderness and frontal sinus tenderness present.      Mouth/Throat:      Lips: Pink.      Mouth: Mucous membranes are moist.      Pharynx: Oropharyngeal exudate (clear post nasal drainage.) and posterior oropharyngeal erythema present.   Eyes:      Conjunctiva/sclera: Conjunctivae normal.   Cardiovascular:      Rate and Rhythm: Normal rate and regular rhythm.      Pulses: Normal pulses.      Heart sounds: Normal heart sounds.   Pulmonary:      Effort: Pulmonary effort is normal.      Breath sounds: Normal breath sounds. No wheezing or rhonchi.   Abdominal:      General: Abdomen is flat. Bowel sounds  are normal. There is no distension.      Palpations: Abdomen is soft.      Tenderness: There is no abdominal tenderness.   Musculoskeletal:         General: Normal range of motion.      Cervical back: Normal range of motion.   Skin:     General: Skin is warm and dry.      Capillary Refill: Capillary refill takes less than 2 seconds.   Neurological:      General: No focal deficit present.      Mental Status: She is alert and oriented to person, place, and time. Mental status is at baseline.   Psychiatric:         Mood and Affect: Mood normal.         Behavior: Behavior normal.               Lab Results   Component Value Date    WBC 6.1 01/08/2025    HGB 13.5 01/08/2025    HCT 41.7 01/08/2025    MCV 89.5 09/17/2024     01/08/2025        CMP  Sodium   Date Value Ref Range Status   09/17/2024 143 136 - 145 mmol/L Final     Potassium   Date Value Ref Range Status   09/17/2024 4.3 3.5 - 5.1 mmol/L Final     Chloride   Date Value Ref Range Status   09/17/2024 108 (H) 98 - 107 mmol/L Final     CO2   Date Value Ref Range Status   09/17/2024 30 21 - 32 mmol/L Final     Glucose   Date Value Ref Range Status   09/17/2024 102 74 - 106 mg/dL Final     BUN   Date Value Ref Range Status   09/17/2024 16 7 - 18 mg/dL Final     Creatinine   Date Value Ref Range Status   09/17/2024 0.68 0.55 - 1.02 mg/dL Final     Calcium   Date Value Ref Range Status   09/17/2024 9.2 8.5 - 10.1 mg/dL Final     Total Protein   Date Value Ref Range Status   09/17/2024 7.1 6.4 - 8.2 g/dL Final     Albumin   Date Value Ref Range Status   09/17/2024 3.7 3.5 - 5.0 g/dL Final     Bilirubin, Total   Date Value Ref Range Status   09/17/2024 0.4 >0.0 - 1.2 mg/dL Final     Alk Phos   Date Value Ref Range Status   09/17/2024 91 50 - 130 U/L Final     AST   Date Value Ref Range Status   09/17/2024 15 15 - 37 U/L Final     ALT   Date Value Ref Range Status   09/17/2024 22 13 - 56 U/L Final     Anion Gap   Date Value Ref Range Status   09/17/2024 9 7 - 16  mmol/L Final     eGFR   Date Value Ref Range Status   09/17/2024 100 >=60 mL/min/1.73m2 Final     Procedures   Assessment and Plan (including Health Maintenance)   :    Plan:     Problem List Items Addressed This Visit          ENT    Acute bacterial sinusitis - Primary    Current Assessment & Plan     Negative COVID, Flu, Strep.   Zithromax as ordered and Ed a hist as needed.    Reviewed pathology of current symptoms, medication side effects/risk/benefits/directions on taking medications, and worsening or persistent symptoms that require follow up in next 2-3 days. Saline/steroid nasal sprays, antihistamine use, increase fluid intake, and multivitamin/elderberry/Zinc use were recommended. May take Tylenol or Motrin as needed for pain and/or fever. Patient was instructed to take antibiotic as directed, complete entire course to avoid antibiotic resistance, and take OTC probiotic with antibiotic to prevent GI upset. Patient verbalized understanding of treatment plan and denies any questions.             Relevant Medications    azithromycin (Z-VASYL) 250 MG tablet    chlorpheniramine-phenylephrine 4-10 mg per tablet       Cardiac/Vascular    Hypertension    Current Assessment & Plan     BP elevated today in clinic. States she has not taken her meds yet. Instructed to take as soon as she gets home.           Other Visit Diagnoses       Sore throat        Relevant Orders    POCT Strep A, Molecular    Cough, unspecified type        Relevant Orders    POCT COVID-19 Rapid Screening    POCT Influenza A/B Molecular    Generalized body aches        Relevant Orders    POCT COVID-19 Rapid Screening    POCT Influenza A/B Molecular    Nonintractable headache, unspecified chronicity pattern, unspecified headache type        Relevant Orders    POCT COVID-19 Rapid Screening    POCT Influenza A/B Molecular            Health Maintenance Topics with due status: Not Due       Topic Last Completion Date    Colorectal Cancer Screening  12/14/2015    Hemoglobin A1c (Diabetic Prevention Screening) 12/22/2022    Cervical Cancer Screening 02/23/2023    Mammogram 08/06/2024    Lipid Panel 09/09/2024       Future Appointments   Date Time Provider Department Center   1/21/2025 11:20 AM Zahida Blanchard NP [facility] FAMMED [place]   3/10/2025  8:00 AM [facility] [place]   3/17/2025  1:00 PM Emily Mendoza, FNP [facility] GASTR [facility]   9/18/2025  2:40 PM [facility] MAMMO1 [facility] M[facility]        Health Maintenance Due   Topic Date Due    Hepatitis C Screening  Never done    HIV Screening  Never done    TETANUS VACCINE  Never done    Pneumococcal Vaccines (Age 50+) (1 of 2 - PCV) Never done    Shingles Vaccine (1 of 2) Never done    Influenza Vaccine (1) 09/01/2024    COVID-19 Vaccine (1 - 2024-25 season) Never done    RSV Vaccine (Age 60+ and Pregnant patients) (1 - Risk 60-74 years 1-dose series) Never done          Signature:  Zahida Blanchard NP  [facility]  [address] [address] [place]  [address]    Date of encounter: 1/21/25

## 2025-02-07 ENCOUNTER — HOSPITAL ENCOUNTER (EMERGENCY)
Facility: HOSPITAL | Age: 60
Discharge: HOME | End: 2025-02-07
Payer: COMMERCIAL

## 2025-02-07 VITALS
TEMPERATURE: 98 F | RESPIRATION RATE: 18 BRPM | SYSTOLIC BLOOD PRESSURE: 141 MMHG | BODY MASS INDEX: 42.68 KG/M2 | HEART RATE: 78 BPM | OXYGEN SATURATION: 97 % | HEIGHT: 64 IN | DIASTOLIC BLOOD PRESSURE: 97 MMHG | WEIGHT: 250 LBS

## 2025-02-07 DIAGNOSIS — M25.562 LEFT KNEE PAIN: ICD-10-CM

## 2025-02-07 DIAGNOSIS — I83.92 VARICOSE VEINS OF LEFT LOWER EXTREMITY, UNSPECIFIED WHETHER COMPLICATED: Primary | ICD-10-CM

## 2025-02-07 LAB
ALBUMIN SERPL BCP-MCNC: 3.8 G/DL (ref 3.4–4.8)
ALBUMIN/GLOB SERPL: 1.2 {RATIO}
ALP SERPL-CCNC: 79 U/L (ref 40–150)
ALT SERPL W P-5'-P-CCNC: 19 U/L
ANION GAP SERPL CALCULATED.3IONS-SCNC: 13 MMOL/L (ref 7–16)
AST SERPL W P-5'-P-CCNC: 18 U/L (ref 5–34)
BASOPHILS # BLD AUTO: 0.03 K/UL (ref 0–0.2)
BASOPHILS NFR BLD AUTO: 0.4 % (ref 0–1)
BILIRUB SERPL-MCNC: 0.2 MG/DL
BUN SERPL-MCNC: 20 MG/DL (ref 10–20)
BUN/CREAT SERPL: 25 (ref 6–20)
CALCIUM SERPL-MCNC: 9.6 MG/DL (ref 8.4–10.2)
CHLORIDE SERPL-SCNC: 110 MMOL/L (ref 98–107)
CO2 SERPL-SCNC: 23 MMOL/L (ref 23–31)
CREAT SERPL-MCNC: 0.8 MG/DL (ref 0.55–1.02)
D DIMER PPP FEU-MCNC: <0.27 ΜG/ML (ref 0–0.47)
DIFFERENTIAL METHOD BLD: ABNORMAL
EGFR (NO RACE VARIABLE) (RUSH/TITUS): 84 ML/MIN/1.73M2
EOSINOPHIL # BLD AUTO: 0.2 K/UL (ref 0–0.5)
EOSINOPHIL NFR BLD AUTO: 2.6 % (ref 1–4)
ERYTHROCYTE [DISTWIDTH] IN BLOOD BY AUTOMATED COUNT: 12.4 % (ref 11.5–14.5)
GLOBULIN SER-MCNC: 3.1 G/DL (ref 2–4)
GLUCOSE SERPL-MCNC: 110 MG/DL (ref 82–115)
HCT VFR BLD AUTO: 38.4 % (ref 38–47)
HGB BLD-MCNC: 12.6 G/DL (ref 12–16)
LYMPHOCYTES # BLD AUTO: 2.15 K/UL (ref 1–4.8)
LYMPHOCYTES NFR BLD AUTO: 27.8 % (ref 27–41)
MCH RBC QN AUTO: 30.6 PG (ref 27–31)
MCHC RBC AUTO-ENTMCNC: 32.8 G/DL (ref 32–36)
MCV RBC AUTO: 93.2 FL (ref 80–96)
MONOCYTES # BLD AUTO: 0.77 K/UL (ref 0–0.8)
MONOCYTES NFR BLD AUTO: 10 % (ref 2–6)
MPC BLD CALC-MCNC: 10.6 FL (ref 9.4–12.4)
NEUTROPHILS # BLD AUTO: 4.57 K/UL (ref 1.8–7.7)
NEUTROPHILS NFR BLD AUTO: 59.2 % (ref 53–65)
PLATELET # BLD AUTO: 251 K/UL (ref 150–400)
POTASSIUM SERPL-SCNC: 4 MMOL/L (ref 3.5–5.1)
PROT SERPL-MCNC: 6.9 G/DL (ref 5.8–7.6)
RBC # BLD AUTO: 4.12 M/UL (ref 4.2–5.4)
SODIUM SERPL-SCNC: 142 MMOL/L (ref 136–145)
WBC # BLD AUTO: 7.72 K/UL (ref 4.5–11)

## 2025-02-07 PROCEDURE — 99284 EMERGENCY DEPT VISIT MOD MDM: CPT | Mod: ,,, | Performed by: NURSE PRACTITIONER

## 2025-02-07 PROCEDURE — 99284 EMERGENCY DEPT VISIT MOD MDM: CPT | Mod: 25

## 2025-02-07 PROCEDURE — 85379 FIBRIN DEGRADATION QUANT: CPT | Performed by: NURSE PRACTITIONER

## 2025-02-07 PROCEDURE — 80053 COMPREHEN METABOLIC PANEL: CPT | Performed by: NURSE PRACTITIONER

## 2025-02-07 PROCEDURE — 36415 COLL VENOUS BLD VENIPUNCTURE: CPT | Performed by: NURSE PRACTITIONER

## 2025-02-07 PROCEDURE — 85025 COMPLETE CBC W/AUTO DIFF WBC: CPT | Performed by: NURSE PRACTITIONER

## 2025-02-08 NOTE — DISCHARGE INSTRUCTIONS
-Take Aleve (over the counter) 2 tabs by mouth every 12 hours with food.   -Ice pack on 10-15 minutes, then off 30 minutes.

## 2025-02-08 NOTE — ED PROVIDER NOTES
Encounter Date: 2/7/2025       History     Chief Complaint   Patient presents with    Knee Pain     60 y/o female arrived to the ED via POV with complaint of left knee pain for the past month. Has popping of knee at times, but denies injury. Also, has chronic large varicose vein posterior knee up back of thigh. Tender with palpation posterior knee. No redness or swelling noted.    The history is provided by the patient.     Review of patient's allergies indicates:   Allergen Reactions    Penicillins Hives and Rash     Can take rocephin  Other reaction(s): > 10 years ago  Can take rocephin      Celecoxib     Ciprofloxacin      States she can not take them due to aneurism     Fentanyl Other (See Comments)     Extreme sedation  Other reaction(s): Other (See Comments)  Extreme sedation  Extreme sedation      Hydrochlorothiazide Other (See Comments)     Reaction unknown  Other reaction(s): Other (See Comments)  Reaction unknown  Reaction unknown    Promethazine hcl Other (See Comments)     Hallucinations    Quinolones Other (See Comments)    Betamethasone Other (See Comments) and Rash     Flushing  Flushing  Flushing    Hydromorphone hcl Palpitations    Latex Rash    Morphine Palpitations    Sulfa (sulfonamide antibiotics) Itching     Past Medical History:   Diagnosis Date    Cervical disc disorder     Fibromyalgia     Hiatal hernia with GERD     Hyperlipidemia     Hypertension     Morbid obesity with BMI of 40.0-44.9, adult     Nonalcoholic fatty liver disease     Stroke     Thoracic aortic aneurysm     Unspecified osteoarthritis, unspecified site     Vitamin D deficiency 12/22/2022     Past Surgical History:   Procedure Laterality Date    CARDIAC CATHETERIZATION Left 07/31/2008    Performed by Dr. Bruce Cramer    CARPAL TUNNEL RELEASE  12/21/2017    Dr. Moran with cyst removed from left middle finger    CHOLECYSTECTOMY      COLONOSCOPY      DIAGNOSTIC LAPAROSCOPY      DILATION AND CURETTAGE OF UTERUS      EGD,  WITH BALLOON DILATION  2018    Performed by Dr. Marc Lomax    LEFT HEART CATHETERIZATION  05/25/2023    TOTAL REDUCTION MAMMOPLASTY       Family History   Problem Relation Name Age of Onset    Hyperlipidemia Mother      Hypertension Mother      Glaucoma Mother      Diabetes Mother      Cervical cancer Mother      Pancreatic cancer Mother      Hyperlipidemia Father      Hypertension Father      Dementia Father      No Known Problems Sister       Social History     Tobacco Use    Smoking status: Never     Passive exposure: Past    Smokeless tobacco: Never   Substance Use Topics    Alcohol use: Never    Drug use: Never     Review of Systems   Constitutional:  Negative for activity change and appetite change.   Respiratory:  Negative for cough, shortness of breath and wheezing.    Cardiovascular:  Negative for chest pain, palpitations and leg swelling.   Musculoskeletal:  Positive for arthralgias (left knee pain). Negative for gait problem and joint swelling.   Skin:  Negative for color change.   All other systems reviewed and are negative.      Physical Exam     Initial Vitals [02/07/25 2055]   BP Pulse Resp Temp SpO2   (!) 141/97 78 18 97.9 °F (36.6 °C) 97 %      MAP       --         Physical Exam    Nursing note and vitals reviewed.  Constitutional: Vital signs are normal. She appears well-developed and well-nourished. She is cooperative.  Non-toxic appearance. She does not have a sickly appearance. She does not appear ill. No distress.   HENT:   Head: Normocephalic and atraumatic.   Cardiovascular:  Normal rate, regular rhythm and normal heart sounds.           Pulses:       Popliteal pulses are 2+ on the right side and 2+ on the left side.        Posterior tibial pulses are 2+ on the right side and 2+ on the left side.   No edema to BLE   Pulmonary/Chest: Effort normal and breath sounds normal. She has no decreased breath sounds. She has no wheezes. She has no rhonchi. She has no rales.   Musculoskeletal:       Right hip: Normal.      Left hip: Normal.      Right knee: Normal.      Left knee: Bony tenderness present. No swelling or erythema. Normal range of motion. Tenderness present over the medial joint line. Normal pulse.      Comments: Left knee crepitus with ROM     Neurological: She is alert and oriented to person, place, and time. She has normal strength. Gait normal.   Skin: Skin is warm, dry and intact. Capillary refill takes less than 2 seconds. No bruising and no rash noted. No erythema.   Psychiatric: She has a normal mood and affect. Her speech is normal and behavior is normal. Judgment and thought content normal.         Medical Screening Exam   See Full Note    ED Course   Procedures  Labs Reviewed   COMPREHENSIVE METABOLIC PANEL - Abnormal       Result Value    Sodium 142      Potassium 4.0      Chloride 110 (*)     CO2 23      Anion Gap 13      Glucose 110      BUN 20      Creatinine 0.80      BUN/Creatinine Ratio 25 (*)     Calcium 9.6      Total Protein 6.9      Albumin 3.8      Globulin 3.1      A/G Ratio 1.2      Bilirubin, Total 0.2      Alk Phos 79      ALT 19      AST 18      eGFR 84     CBC WITH DIFFERENTIAL - Abnormal    WBC 7.72      RBC 4.12 (*)     Hemoglobin 12.6      Hematocrit 38.4      MCV 93.2      MCH 30.6      MCHC 32.8      RDW 12.4      Platelet Count 251      MPV 10.6      Neutrophils % 59.2      Lymphocytes % 27.8      Neutrophils, Abs 4.57      Lymphocytes, Absolute 2.15      Diff Type Auto      Monocytes % 10.0 (*)     Eosinophils % 2.6      Basophils % 0.4      Monocytes, Absolute 0.77      Eosinophils, Absolute 0.20      Basophils, Absolute 0.03     D DIMER, QUANTITATIVE - Normal    D-Dimer <0.27     CBC W/ AUTO DIFFERENTIAL    Narrative:     The following orders were created for panel order CBC auto differential.  Procedure                               Abnormality         Status                     ---------                               -----------         ------                      CBC with Differential[[id number]]       Abnormal            Final result                 Please view results for these tests on the individual orders.          Imaging Results              X-Ray Knee 3 View Left (In process)                      Medications - No data to display  Medical Decision Making  58 y/o female arrived to the ED via POV with complaint of left knee pain for the past month. She is concerned about having a blood clot.  No recent surgeries, no recent flights or prolonged travel. Has popping of knee at times, but denies injury. Thinks it was related to wearing boots. Stopped wearing those boots, but pain in left knee continues. Also, has chronic large varicose vein posterior knee up back of thigh. Tender with palpation posterior knee. No redness or swelling noted. States that she will have pain with varicose veins about once a month due to pressure on vein while sitting in chair.    Problems Addressed:  Left knee pain:     Details: D-dimer <0.27. left knee x-ray: no acute process noted. Formal report pending. Take Aleve 2 tabs by mouth with food twice a day x 10 days for pain/inflammation. Reviewed discharge instructions with patient. She agreed to treatment plan and verbalized understanding.   Varicose veins of left lower extremity, unspecified whether complicated:     Details: Large varicose vein in left leg. No redness or swelling noted. Take Aleve as directed for pain. Follow up with PCP on Monday.     Amount and/or Complexity of Data Reviewed  Labs: ordered. Decision-making details documented in ED Course.  Radiology: ordered. Decision-making details documented in ED Course.                                      Clinical Impression:   Final diagnoses:  [M25.562] Left knee pain  [I83.92] Varicose veins of left lower extremity, unspecified whether complicated (Primary)        ED Disposition Condition    Discharge Stable          ED Prescriptions    None       Follow-up Information        Follow up With Specialties Details Why Contact Info    Zahida Blanchard, NP Family Medicine Call in 3 days To follow up from your ER visit [address] [place] [address] [place] [address]  [phone]               Danica Mojica, P  02/07/25 2590

## 2025-02-10 ENCOUNTER — TELEPHONE (OUTPATIENT)
Dept: EMERGENCY MEDICINE | Facility: HOSPITAL | Age: 60
End: 2025-02-10
Payer: COMMERCIAL

## 2025-02-25 DIAGNOSIS — I10 HYPERTENSION, UNSPECIFIED TYPE: ICD-10-CM

## 2025-02-25 RX ORDER — LISINOPRIL 2.5 MG/1
2.5 TABLET ORAL DAILY
Qty: 30 TABLET | Refills: 0 | Status: SHIPPED | OUTPATIENT
Start: 2025-02-25

## 2025-02-25 RX ORDER — METHOCARBAMOL 500 MG/1
500 TABLET, FILM COATED ORAL 2 TIMES DAILY
COMMUNITY
Start: 2025-01-08

## 2025-02-25 RX ORDER — NORTRIPTYLINE HYDROCHLORIDE 10 MG/1
1 CAPSULE ORAL NIGHTLY
COMMUNITY
Start: 2025-01-08

## 2025-02-25 RX ORDER — LISINOPRIL 5 MG/1
5 TABLET ORAL DAILY
Qty: 30 TABLET | Refills: 0 | Status: SHIPPED | OUTPATIENT
Start: 2025-02-25

## 2025-02-25 RX ORDER — NABUMETONE 500 MG/1
500 TABLET, FILM COATED ORAL 2 TIMES DAILY
COMMUNITY
Start: 2025-01-08

## 2025-02-25 NOTE — TELEPHONE ENCOUNTER
Received fax from [facility] in [place] requesting refill on lisinopril. Patient has an appointment scheduled in the clinic 03/10/25, but will be out of medication before appointment date.

## 2025-03-26 ENCOUNTER — OFFICE VISIT (OUTPATIENT)
Dept: FAMILY MEDICINE | Facility: CLINIC | Age: 60
End: 2025-03-26
Payer: COMMERCIAL

## 2025-03-26 VITALS
HEIGHT: 64 IN | RESPIRATION RATE: 20 BRPM | HEART RATE: 67 BPM | DIASTOLIC BLOOD PRESSURE: 74 MMHG | BODY MASS INDEX: 43.36 KG/M2 | TEMPERATURE: 99 F | WEIGHT: 254 LBS | SYSTOLIC BLOOD PRESSURE: 122 MMHG

## 2025-03-26 DIAGNOSIS — I10 HYPERTENSION, UNSPECIFIED TYPE: ICD-10-CM

## 2025-03-26 DIAGNOSIS — E78.5 HYPERLIPIDEMIA, UNSPECIFIED HYPERLIPIDEMIA TYPE: Primary | ICD-10-CM

## 2025-03-26 PROBLEM — N30.91 CYSTITIS WITH HEMATURIA: Status: RESOLVED | Noted: 2024-09-13 | Resolved: 2025-03-26

## 2025-03-26 PROBLEM — B96.89 ACUTE BACTERIAL SINUSITIS: Status: RESOLVED | Noted: 2025-01-21 | Resolved: 2025-03-26

## 2025-03-26 PROBLEM — J01.90 ACUTE BACTERIAL SINUSITIS: Status: RESOLVED | Noted: 2025-01-21 | Resolved: 2025-03-26

## 2025-03-26 PROCEDURE — 3074F SYST BP LT 130 MM HG: CPT | Mod: CPTII,,, | Performed by: NURSE PRACTITIONER

## 2025-03-26 PROCEDURE — 3008F BODY MASS INDEX DOCD: CPT | Mod: CPTII,,, | Performed by: NURSE PRACTITIONER

## 2025-03-26 PROCEDURE — 99213 OFFICE O/P EST LOW 20 MIN: CPT | Mod: ,,, | Performed by: NURSE PRACTITIONER

## 2025-03-26 PROCEDURE — 4010F ACE/ARB THERAPY RXD/TAKEN: CPT | Mod: CPTII,,, | Performed by: NURSE PRACTITIONER

## 2025-03-26 PROCEDURE — 1160F RVW MEDS BY RX/DR IN RCRD: CPT | Mod: CPTII,,, | Performed by: NURSE PRACTITIONER

## 2025-03-26 PROCEDURE — 1159F MED LIST DOCD IN RCRD: CPT | Mod: CPTII,,, | Performed by: NURSE PRACTITIONER

## 2025-03-26 PROCEDURE — 3078F DIAST BP <80 MM HG: CPT | Mod: CPTII,,, | Performed by: NURSE PRACTITIONER

## 2025-03-26 RX ORDER — LISINOPRIL 2.5 MG/1
2.5 TABLET ORAL DAILY
Qty: 90 TABLET | Refills: 1 | Status: SHIPPED | OUTPATIENT
Start: 2025-03-26

## 2025-03-26 RX ORDER — LISINOPRIL 5 MG/1
5 TABLET ORAL DAILY
Qty: 90 TABLET | Refills: 1 | Status: SHIPPED | OUTPATIENT
Start: 2025-03-26

## 2025-03-26 NOTE — PROGRESS NOTES
Zahida Blanchard NP   [facility]  [address] [address] [address]  [phone]      PATIENT NAME: Mally Woods  : 1965  DATE: 3/26/25  MRN: [id number]      Billing Provider: Zahida Blanchard NP  Level of Service: NM OFFICE/OUTPT VISIT, EST, LEVL III, 20-29 MIN  Patient PCP Information       Provider PCP Type    Zahida Blanchard NP General            Chief Complaint   Patient presents with    Follow-up     6 month follow up. Due for medication refill. She does report the aneurysm that has been monitored was actually smaller in size than physician had stated it was 4.6 but was actually 4.3 in size.          Medications and Allergies     Medications  Outpatient Medications Marked as Taking for the 3/26/25 encounter (Office Visit) with Zahida Blanchard NP   Medication Sig Dispense Refill    methocarbamoL (ROBAXIN) 500 MG Tab Take 500 mg by mouth 2 (two) times daily.      nabumetone (RELAFEN) 500 MG tablet Take 500 mg by mouth 2 (two) times daily.      [DISCONTINUED] lisinopriL (PRINIVIL,ZESTRIL) 2.5 MG tablet Take 1 tablet (2.5 mg total) by mouth once daily. 30 tablet 0    [DISCONTINUED] lisinopriL (PRINIVIL,ZESTRIL) 5 MG tablet Take 1 tablet (5 mg total) by mouth once daily. 30 tablet 0       Allergies  Review of patient's allergies indicates:   Allergen Reactions    Penicillins Hives and Rash     Can take rocephin  Other reaction(s): > 10 years ago  Can take rocephin      Celecoxib     Ciprofloxacin      States she can not take them due to aneurism     Fentanyl Other (See Comments)     Extreme sedation  Other reaction(s): Other (See Comments)  Extreme sedation  Extreme sedation      Hydrochlorothiazide Other (See Comments)     Reaction unknown  Other reaction(s): Other (See Comments)  Reaction unknown  Reaction unknown    Promethazine hcl Other (See Comments)     Hallucinations    Quinolones Other (See Comments)    Betamethasone Other (See Comments) and Rash      Flushing  Flushing  Flushing    Hydromorphone hcl Palpitations    Latex Rash    Morphine Palpitations    Sulfa (sulfonamide antibiotics) Itching       History of Present Illness    CHIEF COMPLAINT:  Patient presents today for 6 month follow up on chronic conditions.    FIBROMYALGIA:  She reports experiencing a flare with skin hypersensitivity to touch, exacerbated by weather changes. She experiences constant pain in upper back, under shoulder blades, and beneath breasts. She denies taking any medications for fibromyalgia management.    SPINE:  MRI in February showed multiple level disc protrusion. She has a history of whiplash and has previously completed physical therapy sessions. She expresses no interest in returning to physical therapy.    CARDIOVASCULAR:  She has an aortic aneurysm currently measuring 4.3 cm, decreased from previous measurement of 4.6 cm.    ROS:  General: -fever, -chills, -fatigue, -weight gain, -weight loss  Eyes: -vision changes, -redness, -discharge  ENT: -ear pain, -nasal congestion, -sore throat  Cardiovascular: -chest pain, -palpitations, -lower extremity edema  Respiratory: -cough, -shortness of breath  Gastrointestinal: -abdominal pain, -nausea, -vomiting, -diarrhea, -constipation, -blood in stool  Genitourinary: -dysuria, -hematuria, -frequency  Musculoskeletal: -joint pain, +muscle pain, +body aches, +back pain, +nerve pain, +limb pain  Skin: -rash, -lesion  Neurological: -headache, -dizziness, -numbness, +tingling, +burning sensation  Psychiatric: -anxiety, -depression, -sleep difficulty  Breasts: +breast pain            Physical Exam  Vitals and nursing note reviewed.   Constitutional:       Appearance: She is obese.   HENT:      Head: Normocephalic.      Nose: Nose normal.      Mouth/Throat:      Mouth: Mucous membranes are moist.      Pharynx: Oropharynx is clear. No posterior oropharyngeal erythema.   Eyes:      Conjunctiva/sclera: Conjunctivae normal.   Cardiovascular:       Rate and Rhythm: Normal rate and regular rhythm.      Pulses: Normal pulses.      Heart sounds: Normal heart sounds.   Pulmonary:      Effort: Pulmonary effort is normal.      Breath sounds: Normal breath sounds.   Abdominal:      General: Abdomen is flat. Bowel sounds are normal. There is no distension.      Palpations: Abdomen is soft.   Musculoskeletal:         General: No swelling or tenderness. Normal range of motion.      Cervical back: Normal range of motion.      Right lower leg: No edema.      Left lower leg: No edema.   Skin:     General: Skin is warm and dry.      Capillary Refill: Capillary refill takes less than 2 seconds.   Neurological:      Mental Status: She is alert. Mental status is at baseline.   Psychiatric:         Mood and Affect: Mood normal.         Behavior: Behavior normal.         Assessment & Plan      IMPRESSION:  - Assessed fibromyalgia symptoms, noting recent flare-up possibly related to weather changes.  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  - Reviewed recent MRI results showing multiple level disc protrusions.  - Reviewed recent aneurysm imaging, noting stable size at 4.3 cm.  - Assessed current BP management, noting good control on current regimen.  - Discussed cholesterol management, acknowledging previous success with lifestyle modifications.  .    FIBROMYALGIA:  - Noted that the patient is experiencing a fibromyalgia flare since last weekend, with symptoms including soreness to the skin and pain under the breast and shoulder blade.  - Acknowledged the patient's fibromyalgia and discussed potential medication options such as Gabapentin or Lyrica. She declines medication at this time.       THORACIC SPINE PAIN AND DISC DISORDERS:  - Discussed factors related to chronic back pain.  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  - Reviewed MRI from February showing multiple  level disc protrusion in the thoracic region.  - Documented that the patient has been to physical therapy multiple times in the past for neck and shoulder issues.  - She declines referral at present.     WEIGHT MANAGEMENT:  - Recommend improving dietary habits to address weight gain.  - Noted that the patient reports weight gain and difficulty maintaining diet.  - Acknowledged patient's previous success with weight loss and current struggles.  - Suggested giving the patient time to improve diet and lifestyle before rechecking lipids in 6 months.    HYPERLIPIDEMIA MANAGEMENT:  - Educated the patient on the importance of regular cholesterol monitoring.  - Recommend improving dietary habits to address cholesterol levels.  - Noted that the patient's LDL cholesterol was 135 mg/dL 6 months ago, which is above the target of less than 100 mg/dL.  - Suggested dietary changes and supplements like fish oil or CoQ10 to help lower cholesterol as she does not want to start a statin.  - Planned to recheck lipids in 6 months.    HYPERTENSION MANAGEMENT:  - Continued Lisinopril as ordered.   - Noted that the patient's blood pressure is well-controlled on current medication.      FOLLOW-UP AND GENERAL RECOMMENDATIONS:  - Follow up in 6 months or as needed.     Health Maintenance Due   Topic Date Due    Hepatitis C Screening  Never done    HIV Screening  Never done    TETANUS VACCINE  Never done    Pneumococcal Vaccines (Age 50+) (1 of 2 - PCV) Never done    Shingles Vaccine (1 of 2) Never done    Influenza Vaccine (1) 09/01/2024    COVID-19 Vaccine (1 - 2024-25 season) Never done    RSV Vaccine (Age 60+ and Pregnant patients) (1 - Risk 60-74 years 1-dose series) Never done       Problem List Items Addressed This Visit       Hypertension    Relevant Medications    lisinopriL (PRINIVIL,ZESTRIL) 2.5 MG tablet    lisinopriL (PRINIVIL,ZESTRIL) 5 MG tablet    Hyperlipidemia - Primary       Health Maintenance Topics with due status: Not Due        Topic Last Completion Date    Colorectal Cancer Screening 12/14/2015    Cervical Cancer Screening 02/23/2023    Mammogram 08/06/2024    Lipid Panel 09/09/2024    Hemoglobin A1c (Diabetic Prevention Screening) 01/10/2025       Future Appointments   Date Time Provider Department Center   6/5/2025  2:20 PM Emily Mendoza FNP [facility]   9/18/2025  2:40 PM [facility]MO1 [facility]MO [facility]   9/19/2025  1:00 PM Zahida Blanchard NP [facility] [place] [place]   9/26/2025  2:00 PM [facility] [place]        This note was generated with the assistance of ambient listening technology. Verbal consent was obtained by the patient and accompanying visitor(s) for the recording of patient appointment to facilitate this note. I attest to having reviewed and edited the generated note for accuracy, though some syntax or spelling errors may persist. Please contact the author of this note for any clarification.     Signature:  Zahida Blanchard NP  [facility]  [address] [address] [address]  [phone]    Date of encounter: 3/26/25

## 2025-05-05 ENCOUNTER — HOSPITAL ENCOUNTER (EMERGENCY)
Facility: HOSPITAL | Age: 60
Discharge: HOME | End: 2025-05-05
Attending: EMERGENCY MEDICINE
Payer: COMMERCIAL

## 2025-05-05 VITALS
RESPIRATION RATE: 18 BRPM | BODY MASS INDEX: 42.17 KG/M2 | TEMPERATURE: 97 F | HEIGHT: 64 IN | WEIGHT: 247 LBS | HEART RATE: 67 BPM | OXYGEN SATURATION: 97 % | SYSTOLIC BLOOD PRESSURE: 126 MMHG | DIASTOLIC BLOOD PRESSURE: 71 MMHG

## 2025-05-05 DIAGNOSIS — S93.402A MODERATE ANKLE SPRAIN, LEFT, INITIAL ENCOUNTER: Primary | ICD-10-CM

## 2025-05-05 DIAGNOSIS — T14.90XA INJURY: ICD-10-CM

## 2025-05-05 PROCEDURE — 99283 EMERGENCY DEPT VISIT LOW MDM: CPT | Mod: 25

## 2025-05-05 NOTE — ED PROVIDER NOTES
Encounter Date: 5/5/2025       History     Chief Complaint   Patient presents with    Foot Pain     Pt presents to ED with c/o left pain after she twisted her ankle while getting in her 's truck around 11   AM yesterday.      60 y/o female rolled ankle yesterday morning.  She is complaining of pain and swelling.  Pain is moderate to severe.  Pain is worse with ambulation and palpation.      The history is provided by the patient and the spouse. No  was used.     Review of patient's allergies indicates:   Allergen Reactions    Penicillins Hives and Rash     Can take rocephin  Other reaction(s): > 10 years ago  Can take rocephin      Celecoxib     Ciprofloxacin      States she can not take them due to aneurism     Fentanyl Other (See Comments)     Extreme sedation  Other reaction(s): Other (See Comments)  Extreme sedation  Extreme sedation      Hydrochlorothiazide Other (See Comments)     Reaction unknown  Other reaction(s): Other (See Comments)  Reaction unknown  Reaction unknown    Promethazine hcl Other (See Comments)     Hallucinations    Quinolones Other (See Comments)    Betamethasone Other (See Comments) and Rash     Flushing  Flushing  Flushing    Hydromorphone hcl Palpitations    Latex Rash    Morphine Palpitations    Sulfa (sulfonamide antibiotics) Itching     Past Medical History:   Diagnosis Date    Cervical disc disorder     Fibromyalgia     Hiatal hernia with GERD     Hyperlipidemia     Hypertension     Morbid obesity with BMI of 40.0-44.9, adult     Nonalcoholic fatty liver disease     Stroke     Thoracic aortic aneurysm     Unspecified osteoarthritis, unspecified site     Vitamin D deficiency 12/22/2022     Past Surgical History:   Procedure Laterality Date    CARDIAC CATHETERIZATION Left 07/31/2008    Performed by Dr. Bruce Cramer    CARPAL TUNNEL RELEASE  12/21/2017    Dr. Moran with cyst removed from left middle finger    CHOLECYSTECTOMY      COLONOSCOPY       DIAGNOSTIC LAPAROSCOPY      DILATION AND CURETTAGE OF UTERUS      EGD, WITH BALLOON DILATION  2018    Performed by Dr. Marc Lomax    LEFT HEART CATHETERIZATION  05/25/2023    TOTAL REDUCTION MAMMOPLASTY       Family History   Problem Relation Name Age of Onset    Hyperlipidemia Mother      Hypertension Mother      Glaucoma Mother      Diabetes Mother      Cervical cancer Mother      Pancreatic cancer Mother      Hyperlipidemia Father      Hypertension Father      Dementia Father      No Known Problems Sister       Social History[1]  Review of Systems   All other systems reviewed and are negative.      Physical Exam     Initial Vitals [05/05/25 0708]   BP Pulse Resp Temp SpO2   126/71 67 18 97.3 °F (36.3 °C) 97 %      MAP       --         Physical Exam    Nursing note and vitals reviewed.  Constitutional: She appears well-developed and well-nourished.   HENT:   Head: Normocephalic and atraumatic.   Nose: Nose normal. Mouth/Throat: Oropharynx is clear and moist.   Eyes: Conjunctivae and EOM are normal. Pupils are equal, round, and reactive to light.   Neck: Neck supple.   Normal range of motion.  Cardiovascular:  Normal rate, regular rhythm and normal heart sounds.           Pulmonary/Chest: Breath sounds normal.   Abdominal: Abdomen is soft. Bowel sounds are normal.   Musculoskeletal:         General: Tenderness and edema present. Normal range of motion.      Cervical back: Normal range of motion and neck supple.     Neurological: She is alert and oriented to person, place, and time. She has normal strength. GCS score is 15. GCS eye subscore is 4. GCS verbal subscore is 5. GCS motor subscore is 6.   Skin: Skin is warm and dry.         Medical Screening Exam   See Full Note    ED Course   Procedures  Labs Reviewed - No data to display       Imaging Results              X-Ray Ankle Complete Left (In process)                      X-Ray Foot Complete Left (In process)                      Medications - No data to  display  Medical Decision Making  Amount and/or Complexity of Data Reviewed  Radiology: ordered.                                      Clinical Impression:   Final diagnoses:  [T14.90XA] Injury  [S93.402A] Moderate ankle sprain, left, initial encounter (Primary)        ED Disposition Condition    Discharge Stable          ED Prescriptions    None       Follow-up Information       Follow up With Specialties Details Why Contact Info    Zahida Blanchard NP Family Medicine  As needed [address] [address] [address]  [phone]                   [1]   Social History  Tobacco Use    Smoking status: Never     Passive exposure: Past    Smokeless tobacco: Never   Substance Use Topics    Alcohol use: Never    Drug use: Never        Ruel Hanks MD  05/05/25 2837

## 2025-05-05 NOTE — Clinical Note
"Mally Washingtonela"Chuck was seen and treated in our emergency department on 5/5/2025.  She may return to work on 05/09/2025.       If you have any questions or concerns, please don't hesitate to call.       RN    "

## 2025-05-05 NOTE — DISCHARGE INSTRUCTIONS
Use crutches and do no put weight on affected ankle for 2 weeks.  Follow up if symptoms persist or worsen or otherwise as needed.     No

## 2025-05-05 NOTE — ED NOTES
Pt educated on using crutches and a walker. Pt sent home with ace wrap and ice pack for additional support

## 2025-06-23 ENCOUNTER — RESULTS FOLLOW-UP (OUTPATIENT)
Dept: FAMILY MEDICINE | Facility: CLINIC | Age: 60
End: 2025-06-23

## 2025-06-23 ENCOUNTER — OFFICE VISIT (OUTPATIENT)
Dept: FAMILY MEDICINE | Facility: CLINIC | Age: 60
End: 2025-06-23
Payer: COMMERCIAL

## 2025-06-23 VITALS
TEMPERATURE: 98 F | HEIGHT: 64 IN | BODY MASS INDEX: 42 KG/M2 | WEIGHT: 246 LBS | RESPIRATION RATE: 18 BRPM | DIASTOLIC BLOOD PRESSURE: 62 MMHG | SYSTOLIC BLOOD PRESSURE: 118 MMHG | OXYGEN SATURATION: 97 % | HEART RATE: 69 BPM

## 2025-06-23 DIAGNOSIS — R11.0 NAUSEA: ICD-10-CM

## 2025-06-23 DIAGNOSIS — A04.5 CAMPYLOBACTER DIARRHEA: Primary | ICD-10-CM

## 2025-06-23 DIAGNOSIS — R19.7 DIARRHEA IN ADULT PATIENT: Primary | ICD-10-CM

## 2025-06-23 LAB
ALBUMIN SERPL BCP-MCNC: 4.3 G/DL (ref 3.4–4.8)
ALBUMIN/GLOB SERPL: 1.2 {RATIO}
ALP SERPL-CCNC: 89 U/L (ref 40–150)
ALT SERPL W P-5'-P-CCNC: 16 U/L
ANION GAP SERPL CALCULATED.3IONS-SCNC: 18 MMOL/L (ref 7–16)
AST SERPL W P-5'-P-CCNC: 22 U/L (ref 11–45)
BASOPHILS # BLD AUTO: 0.05 K/UL (ref 0–0.2)
BASOPHILS NFR BLD AUTO: 0.6 % (ref 0–1)
BILIRUB SERPL-MCNC: 0.3 MG/DL
BUN SERPL-MCNC: 16 MG/DL (ref 10–20)
BUN/CREAT SERPL: 23 (ref 6–20)
CALCIUM SERPL-MCNC: 10.2 MG/DL (ref 8.4–10.2)
CHLORIDE SERPL-SCNC: 103 MMOL/L (ref 98–107)
CO2 SERPL-SCNC: 27 MMOL/L (ref 23–31)
CREAT SERPL-MCNC: 0.69 MG/DL (ref 0.55–1.02)
DIFFERENTIAL METHOD BLD: ABNORMAL
EGFR (NO RACE VARIABLE) (RUSH/TITUS): 99 ML/MIN/1.73M2
EOSINOPHIL # BLD AUTO: 0.12 K/UL (ref 0–0.5)
EOSINOPHIL NFR BLD AUTO: 1.5 % (ref 1–4)
ERYTHROCYTE [DISTWIDTH] IN BLOOD BY AUTOMATED COUNT: 12.1 % (ref 11.5–14.5)
GLOBULIN SER-MCNC: 3.6 G/DL (ref 2–4)
GLUCOSE SERPL-MCNC: 85 MG/DL (ref 82–115)
HCT VFR BLD AUTO: 40 % (ref 38–47)
HGB BLD-MCNC: 13 G/DL (ref 12–16)
IMM GRANULOCYTES # BLD AUTO: 0.02 K/UL (ref 0–0.04)
IMM GRANULOCYTES NFR BLD: 0.2 % (ref 0–0.4)
LYMPHOCYTES # BLD AUTO: 1.91 K/UL (ref 1–4.8)
LYMPHOCYTES NFR BLD AUTO: 23.1 % (ref 27–41)
MCH RBC QN AUTO: 30.2 PG (ref 27–31)
MCHC RBC AUTO-ENTMCNC: 32.5 G/DL (ref 32–36)
MCV RBC AUTO: 92.8 FL (ref 80–96)
MONOCYTES # BLD AUTO: 0.58 K/UL (ref 0–0.8)
MONOCYTES NFR BLD AUTO: 7 % (ref 2–6)
MPC BLD CALC-MCNC: 11.6 FL (ref 9.4–12.4)
NEUTROPHILS # BLD AUTO: 5.59 K/UL (ref 1.8–7.7)
NEUTROPHILS NFR BLD AUTO: 67.6 % (ref 53–65)
NRBC # BLD AUTO: 0 X10E3/UL
NRBC, AUTO (.00): 0 %
PLATELET # BLD AUTO: 289 K/UL (ref 150–400)
POTASSIUM SERPL-SCNC: 4.1 MMOL/L (ref 3.5–5.1)
PROT SERPL-MCNC: 7.9 G/DL (ref 5.8–7.6)
RBC # BLD AUTO: 4.31 M/UL (ref 4.2–5.4)
SODIUM SERPL-SCNC: 144 MMOL/L (ref 136–145)
WBC # BLD AUTO: 8.27 K/UL (ref 4.5–11)

## 2025-06-23 PROCEDURE — 1159F MED LIST DOCD IN RCRD: CPT | Mod: CPTII,,, | Performed by: NURSE PRACTITIONER

## 2025-06-23 PROCEDURE — 3078F DIAST BP <80 MM HG: CPT | Mod: CPTII,,, | Performed by: NURSE PRACTITIONER

## 2025-06-23 PROCEDURE — 3008F BODY MASS INDEX DOCD: CPT | Mod: CPTII,,, | Performed by: NURSE PRACTITIONER

## 2025-06-23 PROCEDURE — 99214 OFFICE O/P EST MOD 30 MIN: CPT | Mod: ,,, | Performed by: NURSE PRACTITIONER

## 2025-06-23 PROCEDURE — 80053 COMPREHEN METABOLIC PANEL: CPT | Mod: ,,, | Performed by: CLINICAL MEDICAL LABORATORY

## 2025-06-23 PROCEDURE — 4010F ACE/ARB THERAPY RXD/TAKEN: CPT | Mod: CPTII,,, | Performed by: NURSE PRACTITIONER

## 2025-06-23 PROCEDURE — 3074F SYST BP LT 130 MM HG: CPT | Mod: CPTII,,, | Performed by: NURSE PRACTITIONER

## 2025-06-23 PROCEDURE — 1160F RVW MEDS BY RX/DR IN RCRD: CPT | Mod: CPTII,,, | Performed by: NURSE PRACTITIONER

## 2025-06-23 PROCEDURE — 85025 COMPLETE CBC W/AUTO DIFF WBC: CPT | Mod: ,,, | Performed by: CLINICAL MEDICAL LABORATORY

## 2025-06-23 RX ORDER — ESOMEPRAZOLE MAGNESIUM 40 MG/1
40 CAPSULE, DELAYED RELEASE ORAL 2 TIMES DAILY
Qty: 60 CAPSULE | Refills: 2 | Status: SHIPPED | OUTPATIENT
Start: 2025-06-23 | End: 2026-06-23

## 2025-06-23 RX ORDER — ONDANSETRON 4 MG/1
8 TABLET, ORALLY DISINTEGRATING ORAL EVERY 8 HOURS PRN
Qty: 30 TABLET | Refills: 0 | Status: SHIPPED | OUTPATIENT
Start: 2025-06-23

## 2025-06-23 NOTE — LETTER
June 23, 2025      [facility] [address] [place] [address]  Phone: [phone]  Fax: [phone]       Patient: Mally Woods   YOB: 1965  Date of Visit: 06/23/2025    To Whom It May Concern:    Moises Woods  was at [facility] on 06/23/2025. The patient may return to work/school on 06/24/2025 with no restrictions. If you have any questions or concerns, or if I can be of further assistance, please do not hesitate to contact me.    Sincerely,    Birgit Toro LPN

## 2025-06-23 NOTE — PROGRESS NOTES
MARY Crawley   [facility]  [id number] [address] [address]  [phone]      PATIENT NAME: Mally Woods  : 1965  DATE: 25  MRN: [id number]      Billing Provider: MARY Crawley  Level of Service:   Patient PCP Information       Provider PCP Type    Zahida Blanchard NP General            History of Present Illness    HPI:  Patient presents with complaints of ongoing diarrhea x 8 days. Patient reports consuming spinach from a marked-down package at the grocery store approximately 8 days ago. By 2 AM the following day, she developed severe abdominal pain and diarrhea. The diarrhea has been severe and persistent, occurring even after consuming water or crackers. She has had these symptoms for a week, with burning sensations in her chest and upper stomach, which she attributes to possible gastritis. She has a history of GERD for 20 years and has had gastritis in the past. She describes current symptoms as different from her usual presentations, noting that the pain is not in her jaw or arm as it typically would be during her GERD episodes. The diarrhea has improved somewhat, but she still has burning sensations, particularly after eating. She has been eating bland foods like baked chicken and rice to manage her symptoms. She reports some nausea, but it is not as severe as the initial diarrhea. She has been evaluated by Dr. Lomax for over 20 years for her digestive issues and has had multiple episodes of gastritis in the past, which were never bacterial. She has been to the ER multiple times for similar symptoms, often presenting as if having a heart attack with pain radiating to her jaw and back. She has received GI cocktails in the ER for symptom relief. She has taken Tums (Ultra 1000 strength) for current symptoms but notes she can only take up to 7 of these. She has previously been on Nexium twice daily for severe acid reflux and  later switched to Protonix, which caused side effects. She emphasizes that she is sensitive to strong medications and typically requires lower doses.    She denies taking Relafen or other medications that could irritate her abdomen.    MEDICAL HISTORY:  Patient has a history of GERD for 20 years, gastritis, and fatty liver.    IMAGING:  Patient underwent an abdominal X-ray during the current visit.      ROS:  General: -fever, -chills, -fatigue, -weight gain, -weight loss  Eyes: -vision changes, -redness, -discharge  ENT: -ear pain, -nasal congestion, -sore throat  Cardiovascular: -chest pain, -palpitations, -lower extremity edema  Respiratory: -cough, -shortness of breath  Gastrointestinal: +abdominal pain, +nausea, -vomiting, +diarrhea, -constipation, -blood in stool, +heartburn, +indigestion, +loss of appetite  Genitourinary: -dysuria, -hematuria, -frequency  Musculoskeletal: -joint pain, -muscle pain  Skin: -rash, -lesion  Neurological: -headache, -dizziness, -numbness, -tingling  Psychiatric: -anxiety, -depression, -sleep difficulty          Medications and Allergies     Medications  Outpatient Medications Marked as Taking for the 6/23/25 encounter (Office Visit) with Khushbu Parmar FNP   Medication Sig Dispense Refill    ergocalciferol (ERGOCALCIFEROL) 50,000 unit Cap TAKE 1 CAPSULE BY MOUTH TWICE WEEKLY AS DIRECTED 24 capsule 1    lisinopriL (PRINIVIL,ZESTRIL) 5 MG tablet Take 1 tablet (5 mg total) by mouth once daily. 90 tablet 1    vit c-ascorbate Ca-ascorb sod (VITAMIN C) 500 mg/15 mL Liqd as directed         Allergies  Review of patient's allergies indicates:   Allergen Reactions    Penicillins Hives and Rash     Can take rocephin  Other reaction(s): > 10 years ago  Can take rocephin      Celecoxib     Ciprofloxacin      States she can not take them due to aneurism     Fentanyl Other (See Comments)     Extreme sedation  Other reaction(s): Other (See Comments)  Extreme sedation  Extreme sedation       Hydrochlorothiazide Other (See Comments)     Reaction unknown  Other reaction(s): Other (See Comments)  Reaction unknown  Reaction unknown    Hydromorphone Other (See Comments)    Promethazine hcl Other (See Comments)     Hallucinations    Quinolones Other (See Comments)    Betamethasone Other (See Comments) and Rash     Flushing  Flushing  Flushing    Hydromorphone hcl Palpitations    Latex Rash    Morphine Palpitations and Other (See Comments)    Sulfa (sulfonamide antibiotics) Itching       Physical Exam  Constitutional:       General: She is not in acute distress.  HENT:      Head: Normocephalic.      Nose: Nose normal.      Mouth/Throat:      Mouth: Mucous membranes are moist.   Eyes:      Extraocular Movements: Extraocular movements intact.   Cardiovascular:      Rate and Rhythm: Normal rate.   Pulmonary:      Effort: Pulmonary effort is normal. No respiratory distress.   Abdominal:      Palpations: Abdomen is soft.      Tenderness: There is no abdominal tenderness.      Comments: Hyperactive bowel sounds   Musculoskeletal:         General: Normal range of motion.      Cervical back: Normal range of motion.   Skin:     General: Skin is warm.   Neurological:      Mental Status: She is alert and oriented to person, place, and time.   Psychiatric:         Behavior: Behavior normal.          Assessment & Plan    IMPRESSION:  - Assessed symptoms as likely gastritis based on history of GERD and gastritis.  - Reviewed abdominal XR, noting signs of inflammation.  - Considered antibiotics if bacterial infection is confirmed by test results.    FOOD POISONING:  - Monitored patient's 8-day diarrhea following spinach consumption.  - Physical exam revealed hyperactive bowel sounds.   - Ordered stool culture to rule out bacterial cause, along with labs.  - Prescribed Zofran PRN for nausea and to slow diarrhea.  - Instructed patient to follow clear liquid diet advancing to bland diet as tolerated, while maintaining adequate  hydration.  - Patient to contact office if symptoms worsen or fail to improve.  - Consider referral back to Dr Lomax.     ACID REFLUX:  - Prescribed Nexium twice daily for management.    FOLLOW-UP:  - Will contact patient with X-ray results are available from radiology.   - Follow up if symptoms worsen or fail to improve.          Health Maintenance Due   Topic Date Due    Hepatitis C Screening  Never done    HIV Screening  Never done    TETANUS VACCINE  Never done    Pneumococcal Vaccines (Age 50+) (1 of 2 - PCV) Never done    Shingles Vaccine (1 of 2) Never done    COVID-19 Vaccine (1 - 2024-25 season) Never done    RSV Vaccine (Age 60+ and Pregnant patients) (1 - Risk 60-74 years 1-dose series) Never done    Mammogram  08/06/2025       Problem List Items Addressed This Visit    None  Visit Diagnoses         Diarrhea in adult patient    -  Primary    Relevant Medications    esomeprazole (NEXIUM) 40 MG capsule    Other Relevant Orders    X-Ray Abdomen Flat And Erect    H. pylori antigen, stool    Occult blood x 1, stool    Stool culture    CBC Auto Differential    Comprehensive Metabolic Panel      Nausea        Relevant Medications    ondansetron (ZOFRAN-ODT) 4 MG TbDL            Health Maintenance Topics with due status: Not Due       Topic Last Completion Date    Colorectal Cancer Screening 12/14/2015    Cervical Cancer Screening 02/23/2023    Influenza Vaccine 03/05/2024    Lipid Panel 09/09/2024    Hemoglobin A1c (Diabetic Prevention Screening) 01/10/2025       Future Appointments   Date Time Provider Department Center   9/11/2025  2:20 PM Emily Mendoza FNP [facility]   9/18/2025  2:40 PM [facility] M[facility]MO[facility] M[facility]MO [facility]   9/19/2025  1:00 PM Zahida Blanchard, NP [facility] FAMMED [place]   9/26/2025  2:00 PM Zahida Blanchard NP [facility] [place]        This note was generated with the assistance of ambient listening technology. Verbal consent was obtained by the patient and  accompanying visitor(s) for the recording of patient appointment to facilitate this note. I attest to having reviewed and edited the generated note for accuracy, though some syntax or spelling errors may persist. Please contact the author of this note for any clarification.     Signature:  MARY Crawley  [facility] [id number] [place] [address]  [phone]    Date of encounter: 6/23/25

## 2025-07-03 RX ORDER — AZITHROMYCIN 500 MG/1
500 TABLET, FILM COATED ORAL DAILY
Qty: 3 TABLET | Refills: 0 | Status: SHIPPED | OUTPATIENT
Start: 2025-07-03 | End: 2025-08-21 | Stop reason: ALTCHOICE

## 2025-07-09 ENCOUNTER — HOSPITAL ENCOUNTER (EMERGENCY)
Facility: HOSPITAL | Age: 60
Discharge: HOME | End: 2025-07-09
Payer: COMMERCIAL

## 2025-07-09 VITALS
TEMPERATURE: 99 F | RESPIRATION RATE: 18 BRPM | HEIGHT: 64 IN | WEIGHT: 240 LBS | SYSTOLIC BLOOD PRESSURE: 118 MMHG | BODY MASS INDEX: 40.97 KG/M2 | HEART RATE: 62 BPM | DIASTOLIC BLOOD PRESSURE: 67 MMHG | OXYGEN SATURATION: 95 %

## 2025-07-09 DIAGNOSIS — R55 NEAR SYNCOPE: Primary | ICD-10-CM

## 2025-07-09 DIAGNOSIS — R42 DIZZINESS: ICD-10-CM

## 2025-07-09 LAB
ALBUMIN SERPL BCP-MCNC: 4.1 G/DL (ref 3.4–4.8)
ALBUMIN/GLOB SERPL: 1.3 {RATIO}
ALP SERPL-CCNC: 83 U/L (ref 40–150)
ALT SERPL W P-5'-P-CCNC: 11 U/L
ANION GAP SERPL CALCULATED.3IONS-SCNC: 11 MMOL/L (ref 7–16)
AST SERPL W P-5'-P-CCNC: 16 U/L (ref 11–45)
BASOPHILS # BLD AUTO: 0.06 K/UL (ref 0–0.2)
BASOPHILS NFR BLD AUTO: 0.7 % (ref 0–1)
BILIRUB SERPL-MCNC: 0.2 MG/DL
BILIRUB UR QL STRIP: NEGATIVE
BUN SERPL-MCNC: 10 MG/DL (ref 10–20)
BUN/CREAT SERPL: 14 (ref 6–20)
CALCIUM SERPL-MCNC: 9.4 MG/DL (ref 8.4–10.2)
CHLORIDE SERPL-SCNC: 108 MMOL/L (ref 98–107)
CLARITY UR: CLEAR
CO2 SERPL-SCNC: 25 MMOL/L (ref 23–31)
COLOR UR: COLORLESS
CREAT SERPL-MCNC: 0.74 MG/DL (ref 0.55–1.02)
DIFFERENTIAL METHOD BLD: ABNORMAL
EGFR (NO RACE VARIABLE) (RUSH/TITUS): 93 ML/MIN/1.73M2
EOSINOPHIL # BLD AUTO: 0.12 K/UL (ref 0–0.5)
EOSINOPHIL NFR BLD AUTO: 1.4 % (ref 1–4)
ERYTHROCYTE [DISTWIDTH] IN BLOOD BY AUTOMATED COUNT: 12 % (ref 11.5–14.5)
GLOBULIN SER-MCNC: 3.2 G/DL (ref 2–4)
GLUCOSE SERPL-MCNC: 96 MG/DL (ref 82–115)
GLUCOSE UR STRIP-MCNC: NORMAL MG/DL
HCT VFR BLD AUTO: 39.3 % (ref 38–47)
HGB BLD-MCNC: 13.1 G/DL (ref 12–16)
IMM GRANULOCYTES # BLD AUTO: 0.04 K/UL (ref 0–0.04)
IMM GRANULOCYTES NFR BLD: 0.5 % (ref 0–0.4)
KETONES UR STRIP-SCNC: NEGATIVE MG/DL
LEUKOCYTE ESTERASE UR QL STRIP: NEGATIVE
LYMPHOCYTES # BLD AUTO: 1.67 K/UL (ref 1–4.8)
LYMPHOCYTES NFR BLD AUTO: 19.2 % (ref 27–41)
MAGNESIUM SERPL-MCNC: 2.1 MG/DL (ref 1.6–2.6)
MCH RBC QN AUTO: 30 PG (ref 27–31)
MCHC RBC AUTO-ENTMCNC: 33.3 G/DL (ref 32–36)
MCV RBC AUTO: 90.1 FL (ref 80–96)
MONOCYTES # BLD AUTO: 0.51 K/UL (ref 0–0.8)
MONOCYTES NFR BLD AUTO: 5.9 % (ref 2–6)
MPC BLD CALC-MCNC: 11.1 FL (ref 9.4–12.4)
NEUTROPHILS # BLD AUTO: 6.28 K/UL (ref 1.8–7.7)
NEUTROPHILS NFR BLD AUTO: 72.3 % (ref 53–65)
NITRITE UR QL STRIP: NEGATIVE
NRBC # BLD AUTO: 0 X10E3/UL
NRBC, AUTO (.00): 0 %
PH UR STRIP: 5 PH UNITS
PLATELET # BLD AUTO: 270 K/UL (ref 150–400)
POTASSIUM SERPL-SCNC: 3.9 MMOL/L (ref 3.5–5.1)
PROT SERPL-MCNC: 7.3 G/DL (ref 5.8–7.6)
PROT UR QL STRIP: NEGATIVE
RBC # BLD AUTO: 4.36 M/UL (ref 4.2–5.4)
RBC # UR STRIP: NEGATIVE /UL
SODIUM SERPL-SCNC: 140 MMOL/L (ref 136–145)
SP GR UR STRIP: 1
TSH SERPL DL<=0.005 MIU/L-ACNC: 0.61 UIU/ML (ref 0.35–4.94)
UROBILINOGEN UR STRIP-ACNC: NORMAL MG/DL
WBC # BLD AUTO: 8.68 K/UL (ref 4.5–11)

## 2025-07-09 PROCEDURE — 36415 COLL VENOUS BLD VENIPUNCTURE: CPT | Performed by: NURSE PRACTITIONER

## 2025-07-09 PROCEDURE — 81003 URINALYSIS AUTO W/O SCOPE: CPT | Performed by: NURSE PRACTITIONER

## 2025-07-09 PROCEDURE — 80050 GENERAL HEALTH PANEL: CPT | Performed by: NURSE PRACTITIONER

## 2025-07-09 PROCEDURE — 93005 ELECTROCARDIOGRAM TRACING: CPT

## 2025-07-09 PROCEDURE — 25000003 PHARM REV CODE 250: Performed by: NURSE PRACTITIONER

## 2025-07-09 PROCEDURE — 80053 COMPREHEN METABOLIC PANEL: CPT | Performed by: NURSE PRACTITIONER

## 2025-07-09 PROCEDURE — 96360 HYDRATION IV INFUSION INIT: CPT

## 2025-07-09 PROCEDURE — 99285 EMERGENCY DEPT VISIT HI MDM: CPT | Mod: 25

## 2025-07-09 PROCEDURE — 83735 ASSAY OF MAGNESIUM: CPT | Performed by: NURSE PRACTITIONER

## 2025-07-09 RX ORDER — SODIUM CHLORIDE 9 MG/ML
1000 INJECTION, SOLUTION INTRAVENOUS
Status: COMPLETED | OUTPATIENT
Start: 2025-07-09 | End: 2025-07-09

## 2025-07-09 RX ADMIN — SODIUM CHLORIDE 1000 ML: 9 INJECTION, SOLUTION INTRAVENOUS at 01:07

## 2025-07-09 NOTE — ED PROVIDER NOTES
"Encounter Date: 7/9/2025       History     Chief Complaint   Patient presents with    Weakness    Dizziness     59 year old female presents to ED with complaint of generalized weakness, dizziness, and near syncope. Patient reports having issues with diarrhea 3 weeks ago and went to PCP's office due to the continued diarrhea. She reports issues started on 6/14. She was diagnosed with Campylobacter on 6/28 and was prescribed 3 day course of Zithromax. Patient reports diarrhea has subsided, but has continued issues with weakness, fatigued, and decreased PO intake. She reports eating bland diet due to having history of abdominal issues but has not been able to tolerate diet. Patient states while at work today, she felt she was about to have a "spell" where she would get weak and dizzy and her coworker noted she was pale. Patient notified her . Patient reports history of cervical spine issues, dizzy spells, fibromyalgia, thoracic aortic aneurysm, CVA, HTN, HLD. She reports she has been followed by CT surgeon in [place] and has gone from twice yearly screenings of aneurysm to once yearly; issue has been present since 2015. Patient states she was instructed to keep pulse rate low due to aneurysm but reports her heart rate has been in the upper 50s. She reports during episode today, she was having difficulty with catching her breath and her chest felt tight. Denies pain/discomfort at this time.     The history is provided by the patient, medical records and the spouse.     Review of patient's allergies indicates:   Allergen Reactions    Penicillins Hives and Rash     Can take rocephin  Other reaction(s): > 10 years ago  Can take rocephin      Celecoxib     Ciprofloxacin      States she can not take them due to aneurism     Fentanyl Other (See Comments)     Extreme sedation  Other reaction(s): Other (See Comments)  Extreme sedation  Extreme sedation      Hydrochlorothiazide Other (See Comments)     Reaction " unknown  Other reaction(s): Other (See Comments)  Reaction unknown  Reaction unknown    Hydromorphone Other (See Comments)    Promethazine hcl Other (See Comments)     Hallucinations    Quinolones Other (See Comments)    Betamethasone Other (See Comments) and Rash     Flushing  Flushing  Flushing    Hydromorphone hcl Palpitations    Latex Rash    Morphine Palpitations and Other (See Comments)    Sulfa (sulfonamide antibiotics) Itching     Past Medical History:   Diagnosis Date    Cervical disc disorder     Fibromyalgia     Hiatal hernia with GERD     Hyperlipidemia     Hypertension     Morbid obesity with BMI of 40.0-44.9, adult     Nonalcoholic fatty liver disease     Stroke     Thoracic aortic aneurysm     Unspecified osteoarthritis, unspecified site     Vitamin D deficiency 12/22/2022     Past Surgical History:   Procedure Laterality Date    CARDIAC CATHETERIZATION Left 07/31/2008    Performed by Dr. Bruce Cramer    CARPAL TUNNEL RELEASE  12/21/2017    Dr. Moran with cyst removed from left middle finger    CHOLECYSTECTOMY      COLONOSCOPY      DIAGNOSTIC LAPAROSCOPY      DILATION AND CURETTAGE OF UTERUS      EGD, WITH BALLOON DILATION  2018    Performed by Dr. Marc Lomax    LEFT HEART CATHETERIZATION  05/25/2023    TOTAL REDUCTION MAMMOPLASTY       Family History   Problem Relation Name Age of Onset    Hyperlipidemia Mother      Hypertension Mother      Glaucoma Mother      Diabetes Mother      Cervical cancer Mother      Pancreatic cancer Mother      Hyperlipidemia Father      Hypertension Father      Dementia Father      No Known Problems Sister       Social History[1]  Review of Systems   Constitutional:  Negative for chills and fever.   Eyes:  Negative for photophobia and visual disturbance.   Respiratory:  Positive for chest tightness and shortness of breath. Negative for cough.    Cardiovascular:  Negative for chest pain and palpitations.   Gastrointestinal:  Positive for nausea. Negative for  abdominal pain and vomiting.   Genitourinary:  Negative for difficulty urinating and dysuria.   Musculoskeletal:  Negative for arthralgias and gait problem.   Skin:  Positive for color change. Negative for wound.   Neurological:  Positive for dizziness and weakness.   Hematological:  Negative for adenopathy. Does not bruise/bleed easily.   Psychiatric/Behavioral:  Negative for agitation and confusion.    All other systems reviewed and are negative.      Physical Exam     Initial Vitals [07/09/25 1159]   BP Pulse Resp Temp SpO2   134/68 63 18 98.4 °F (36.9 °C) 98 %      MAP       --         Physical Exam    Nursing note and vitals reviewed.  Constitutional: She appears well-developed and well-nourished.   HENT:   Head: Normocephalic and atraumatic.   Eyes: EOM are normal. Pupils are equal, round, and reactive to light.   Neck: Neck supple.   Normal range of motion.  Cardiovascular:  Normal rate and regular rhythm.           No murmur heard.  Pulmonary/Chest: She has no wheezes. She has no rhonchi.   Abdominal: Abdomen is soft. She exhibits no distension. There is no abdominal tenderness.   Musculoskeletal:         General: No tenderness or edema.      Cervical back: Normal range of motion and neck supple.     Lymphadenopathy:     She has no cervical adenopathy.   Neurological: She is alert and oriented to person, place, and time. No cranial nerve deficit or sensory deficit.   Skin: Skin is warm and dry. Capillary refill takes less than 2 seconds.   Psychiatric: She has a normal mood and affect. Thought content normal.         Medical Screening Exam   See Full Note    ED Course   Procedures  Labs Reviewed   COMPREHENSIVE METABOLIC PANEL - Abnormal       Result Value    Sodium 140      Potassium 3.9      Chloride 108 (*)     CO2 25      Anion Gap 11      Glucose 96      BUN 10      Creatinine 0.74      BUN/Creatinine Ratio 14      Calcium 9.4      Total Protein 7.3      Albumin 4.1      Globulin 3.2      A/G Ratio 1.3       Bilirubin, Total 0.2      Alk Phos 83      ALT 11      AST 16      eGFR 93     CBC WITH DIFFERENTIAL - Abnormal    WBC 8.68      RBC 4.36      Hemoglobin 13.1      Hematocrit 39.3      MCV 90.1      MCH 30.0      MCHC 33.3      RDW 12.0      Platelet Count 270      MPV 11.1      Neutrophils % 72.3 (*)     Lymphocytes % 19.2 (*)     Monocytes % 5.9      Eosinophils % 1.4      Basophils % 0.7      Immature Granulocytes % 0.5 (*)     nRBC, Auto 0.0      Neutrophils, Abs 6.28      Lymphocytes, Absolute 1.67      Monocytes, Absolute 0.51      Eosinophils, Absolute 0.12      Basophils, Absolute 0.06      Immature Granulocytes, Absolute 0.04      nRBC, Absolute 0.00      Diff Type Auto     MAGNESIUM - Normal    Magnesium 2.1     TSH - Normal    TSH 0.610     CBC W/ AUTO DIFFERENTIAL    Narrative:     The following orders were created for panel order CBC auto differential.  Procedure                               Abnormality         Status                     ---------                               -----------         ------                     CBC with Differential[[id number]]       Abnormal            Final result                 Please view results for these tests on the individual orders.   URINALYSIS, REFLEX TO URINE CULTURE    Color, UA Colorless      Clarity, UA Clear      pH, UA 5.0      Leukocytes, UA Negative      Nitrites, UA Negative      Protein, UA Negative      Glucose, UA Normal      Ketones, UA Negative      Urobilinogen, UA Normal      Bilirubin, UA Negative      Blood, UA Negative      Specific Gravity, UA 1.003            Imaging Results              XR ABDOMEN, ACUTE 2 OR MORE VIEWS WITH CHEST (Final result)  Result time 07/09/25 17:21:51      Final result by Karen Lozano MD (07/09/25 17:21:51)                   Impression:      No acute process seen of the chest or abdomen.      Electronically signed by: Karen Lozano MD  Date:    07/09/2025  Time:    17:21               Narrative:     EXAMINATION:  XR ABDOMEN ACUTE 2 OR MORE VIEWS WITH CHEST    CLINICAL HISTORY:  dizziness; diarrhea;    TECHNIQUE:  Chest and abdomen radiographs    COMPARISON:  06/23/2025 KUB 09/17/2024 chest    FINDINGS:  Chest: The cardiac silhouette is midline.  The pulmonary vascularity is normal.  The lungs are clear, well inflated.  No pleural effusion or pneumothorax.  The osseous structures appear within normal limits.    KUB: Nonspecific bowel gas pattern.  Mild stool retention.  No organomegaly.  No abnormal calcifications.  Surgical clips right upper abdominal quadrant.  The visualized osseous structures appear within normal limits for age.                                       CT Head Without Contrast (Final result)  Result time 07/09/25 13:35:35      Final result by Vahid Amin MD (07/09/25 13:35:35)                   Impression:      No acute large vascular territory infarct or intracranial hemorrhage identified.  If persistent neurologic deficit, MRI brain can be obtained.      Electronically signed by: Vahid Amin MD  Date:    07/09/2025  Time:    13:35               Narrative:    EXAMINATION:  CT HEAD WITHOUT CONTRAST    CLINICAL HISTORY:  Dizziness, persistent/recurrent, cardiac or vascular cause suspected;    TECHNIQUE:  Low dose axial CT images obtained throughout the head without intravenous contrast. Sagittal and coronal reconstructions were performed.    COMPARISON:  CTA head and neck 03/27/2021, MRI brain 03/26/2021, head CT 03/25/2021    FINDINGS:  Intracranial compartment:    Ventricles and sulci are normal in size for age without evidence of hydrocephalus. No extra-axial blood or fluid collections.    The brain parenchyma appears normal. No parenchymal mass, hemorrhage, edema or major vascular distribution infarct.    Skull/extracranial contents (limited evaluation): No fracture. Mastoid air cells and paranasal sinuses are essentially clear.  No acute abnormality of the imaged orbits.             "                           Medications   0.9% NaCl infusion (0 mLs Intravenous Stopped 7/9/25 1416)     Medical Decision Making  59 year old female presents to ED with complaint of generalized weakness, dizziness, and near syncope. Patient reports having issues with diarrhea 3 weeks ago and went to PCP's office due to the continued diarrhea. She reports issues started on 6/14. She was diagnosed with Campylobacter on 6/28 and was prescribed 3 day course of Zithromax. Patient reports diarrhea has subsided, but has continued issues with weakness, fatigued, and decreased PO intake. She reports eating bland diet due to having history of abdominal issues but has not been able to tolerate diet. Patient states while at work today, she felt she was about to have a "spell" where she would get weak and dizzy and her coworker noted she was pale. Patient notified her . Patient reports history of cervical spine issues, dizzy spells, fibromyalgia, thoracic aortic aneurysm, CVA, HTN, HLD. She reports she has been followed by CT surgeon in [place] and has gone from twice yearly screenings of aneurysm to once yearly; issue has been present since 2015. She reports during episode today, she was having difficulty with catching her breath and her chest felt tight. Denies pain/discomfort at this time.     Labs, diagnostics ordered and reviewed  Radiologist interpretation reviewed    EKG ordered and reviewed  EKG significant for no ST elevation  Rate 57  Rhythm sinus deya  Interpreted by ED physician    IV NS, Zofran administered  Cardiologist in [place]; instructed to notify of symptoms and ED visit for appointment    Amount and/or Complexity of Data Reviewed  Labs: ordered.  Radiology: ordered.    Risk  Prescription drug management.                                      Clinical Impression:   Final diagnoses:  [R42] Dizziness  [R55] Near syncope (Primary)        ED Disposition Condition    Discharge Stable          ED " Prescriptions    None       Follow-up Information    None              [1]   Social History  Tobacco Use    Smoking status: Never     Passive exposure: Past    Smokeless tobacco: Never   Substance Use Topics    Alcohol use: Never    Drug use: Never        Parvin Mcdonald, [facility]  07/09/25 [id number]

## 2025-07-09 NOTE — ED TRIAGE NOTES
Mally Williamson, 59 y.o. female, presents to the ED with c/o weakness, dizziness, and nausea States that last week she was dx with food poisoning (campylobacter bacteria). Reports that after dx, she has become progressively weaker.

## 2025-07-09 NOTE — DISCHARGE INSTRUCTIONS
Follow up with Cardiology as discussed. Follow up with PCP in 48-72 hours. Return to ED if any new or worsening of symptoms occur.

## 2025-07-09 NOTE — Clinical Note
"Mally"Martha Woods was seen and treated in our emergency department on 7/9/2025.  She may return to work on 07/14/2025.       If you have any questions or concerns, please don't hesitate to call.      Parvin Mcdonald, KASEYP"

## 2025-07-10 ENCOUNTER — TELEPHONE (OUTPATIENT)
Dept: EMERGENCY MEDICINE | Facility: HOSPITAL | Age: 60
End: 2025-07-10
Payer: COMMERCIAL

## 2025-07-22 ENCOUNTER — OFFICE VISIT (OUTPATIENT)
Dept: FAMILY MEDICINE | Facility: CLINIC | Age: 60
End: 2025-07-22
Payer: COMMERCIAL

## 2025-07-22 VITALS
WEIGHT: 242.81 LBS | HEIGHT: 64 IN | TEMPERATURE: 98 F | DIASTOLIC BLOOD PRESSURE: 72 MMHG | SYSTOLIC BLOOD PRESSURE: 124 MMHG | RESPIRATION RATE: 18 BRPM | OXYGEN SATURATION: 98 % | HEART RATE: 65 BPM | BODY MASS INDEX: 41.45 KG/M2

## 2025-07-22 DIAGNOSIS — R53.1 WEAKNESS: Primary | ICD-10-CM

## 2025-07-22 LAB
25(OH)D3 SERPL-MCNC: 23.1 NG/ML (ref 30–80)
ALBUMIN SERPL BCP-MCNC: 4.2 G/DL (ref 3.4–4.8)
ALBUMIN/GLOB SERPL: 1.2 {RATIO}
ALP SERPL-CCNC: 87 U/L (ref 40–150)
ALT SERPL W P-5'-P-CCNC: 15 U/L
ANION GAP SERPL CALCULATED.3IONS-SCNC: 12 MMOL/L (ref 7–16)
AST SERPL W P-5'-P-CCNC: 19 U/L (ref 11–45)
BASOPHILS # BLD AUTO: 0.05 K/UL (ref 0–0.2)
BASOPHILS NFR BLD AUTO: 0.7 % (ref 0–1)
BILIRUB SERPL-MCNC: 0.4 MG/DL
BUN SERPL-MCNC: 14 MG/DL (ref 10–20)
BUN/CREAT SERPL: 19 (ref 6–20)
CALCIUM SERPL-MCNC: 9.6 MG/DL (ref 8.4–10.2)
CHLORIDE SERPL-SCNC: 106 MMOL/L (ref 98–107)
CO2 SERPL-SCNC: 28 MMOL/L (ref 23–31)
CREAT SERPL-MCNC: 0.73 MG/DL (ref 0.55–1.02)
DIFFERENTIAL METHOD BLD: ABNORMAL
EGFR (NO RACE VARIABLE) (RUSH/TITUS): 94 ML/MIN/1.73M2
EOSINOPHIL # BLD AUTO: 0.06 K/UL (ref 0–0.5)
EOSINOPHIL NFR BLD AUTO: 0.8 % (ref 1–4)
ERYTHROCYTE [DISTWIDTH] IN BLOOD BY AUTOMATED COUNT: 12.7 % (ref 11.5–14.5)
ERYTHROCYTE [SEDIMENTATION RATE] IN BLOOD BY WESTERGREN METHOD: 31 MM/HR (ref 0–30)
FERRITIN SERPL-MCNC: 176 NG/ML (ref 5–204)
FOLATE SERPL-MCNC: 5.7 NG/ML (ref 7–31.4)
GLOBULIN SER-MCNC: 3.4 G/DL (ref 2–4)
GLUCOSE SERPL-MCNC: 87 MG/DL (ref 82–115)
HCT VFR BLD AUTO: 42.3 % (ref 38–47)
HGB BLD-MCNC: 14 G/DL (ref 12–16)
IMM GRANULOCYTES # BLD AUTO: 0.03 K/UL (ref 0–0.04)
IMM GRANULOCYTES NFR BLD: 0.4 % (ref 0–0.4)
IRON SATN MFR SERPL: 24 % (ref 20–50)
IRON SERPL-MCNC: 63 UG/DL (ref 50–170)
LYMPHOCYTES # BLD AUTO: 1.74 K/UL (ref 1–4.8)
LYMPHOCYTES NFR BLD AUTO: 23.5 % (ref 27–41)
MCH RBC QN AUTO: 30.2 PG (ref 27–31)
MCHC RBC AUTO-ENTMCNC: 33.1 G/DL (ref 32–36)
MCV RBC AUTO: 91.2 FL (ref 80–96)
MONOCYTES # BLD AUTO: 0.58 K/UL (ref 0–0.8)
MONOCYTES NFR BLD AUTO: 7.8 % (ref 2–6)
MPC BLD CALC-MCNC: 12.1 FL (ref 9.4–12.4)
NEUTROPHILS # BLD AUTO: 4.96 K/UL (ref 1.8–7.7)
NEUTROPHILS NFR BLD AUTO: 66.8 % (ref 53–65)
NRBC # BLD AUTO: 0 X10E3/UL
NRBC, AUTO (.00): 0 %
PLATELET # BLD AUTO: 261 K/UL (ref 150–400)
POTASSIUM SERPL-SCNC: 3.8 MMOL/L (ref 3.5–5.1)
PROT SERPL-MCNC: 7.6 G/DL (ref 5.8–7.6)
RBC # BLD AUTO: 4.64 M/UL (ref 4.2–5.4)
SODIUM SERPL-SCNC: 142 MMOL/L (ref 136–145)
TIBC SERPL-MCNC: 205 UG/DL (ref 70–310)
TIBC SERPL-MCNC: 268 UG/DL (ref 250–450)
TRANSFERRIN SERPL-MCNC: 240 MG/DL (ref 180–382)
VIT B12 SERPL-MCNC: 1077 PG/ML (ref 213–816)
WBC # BLD AUTO: 7.42 K/UL (ref 4.5–11)

## 2025-07-22 PROCEDURE — 1160F RVW MEDS BY RX/DR IN RCRD: CPT | Mod: CPTII,,, | Performed by: NURSE PRACTITIONER

## 2025-07-22 PROCEDURE — 3074F SYST BP LT 130 MM HG: CPT | Mod: CPTII,,, | Performed by: NURSE PRACTITIONER

## 2025-07-22 PROCEDURE — 86664 EPSTEIN-BARR NUCLEAR ANTIGEN: CPT | Mod: ,,, | Performed by: CLINICAL MEDICAL LABORATORY

## 2025-07-22 PROCEDURE — 83550 IRON BINDING TEST: CPT | Mod: ,,, | Performed by: CLINICAL MEDICAL LABORATORY

## 2025-07-22 PROCEDURE — 85651 RBC SED RATE NONAUTOMATED: CPT | Mod: ,,, | Performed by: CLINICAL MEDICAL LABORATORY

## 2025-07-22 PROCEDURE — 82746 ASSAY OF FOLIC ACID SERUM: CPT | Mod: ,,, | Performed by: CLINICAL MEDICAL LABORATORY

## 2025-07-22 PROCEDURE — 3078F DIAST BP <80 MM HG: CPT | Mod: CPTII,,, | Performed by: NURSE PRACTITIONER

## 2025-07-22 PROCEDURE — 85025 COMPLETE CBC W/AUTO DIFF WBC: CPT | Mod: ,,, | Performed by: CLINICAL MEDICAL LABORATORY

## 2025-07-22 PROCEDURE — 82306 VITAMIN D 25 HYDROXY: CPT | Mod: ,,, | Performed by: CLINICAL MEDICAL LABORATORY

## 2025-07-22 PROCEDURE — 4010F ACE/ARB THERAPY RXD/TAKEN: CPT | Mod: CPTII,,, | Performed by: NURSE PRACTITIONER

## 2025-07-22 PROCEDURE — 83540 ASSAY OF IRON: CPT | Mod: ,,, | Performed by: CLINICAL MEDICAL LABORATORY

## 2025-07-22 PROCEDURE — 86038 ANTINUCLEAR ANTIBODIES: CPT | Mod: ,,, | Performed by: CLINICAL MEDICAL LABORATORY

## 2025-07-22 PROCEDURE — 82607 VITAMIN B-12: CPT | Mod: ,,, | Performed by: CLINICAL MEDICAL LABORATORY

## 2025-07-22 PROCEDURE — 3008F BODY MASS INDEX DOCD: CPT | Mod: CPTII,,, | Performed by: NURSE PRACTITIONER

## 2025-07-22 PROCEDURE — 99213 OFFICE O/P EST LOW 20 MIN: CPT | Mod: ,,, | Performed by: NURSE PRACTITIONER

## 2025-07-22 PROCEDURE — 86665 EPSTEIN-BARR CAPSID VCA: CPT | Mod: ,,, | Performed by: CLINICAL MEDICAL LABORATORY

## 2025-07-22 PROCEDURE — 82728 ASSAY OF FERRITIN: CPT | Mod: ,,, | Performed by: CLINICAL MEDICAL LABORATORY

## 2025-07-22 PROCEDURE — 80053 COMPREHEN METABOLIC PANEL: CPT | Mod: ,,, | Performed by: CLINICAL MEDICAL LABORATORY

## 2025-07-22 PROCEDURE — 1159F MED LIST DOCD IN RCRD: CPT | Mod: CPTII,,, | Performed by: NURSE PRACTITIONER

## 2025-07-24 LAB
ALPHA1 GLOB MFR CSF ELPH: 3.28 % (ref 0–0.9)
ALPHA2 GLOB MFR CSF ELPH: 0 % (ref 0–0.9)
ANA SER QL: NEGATIVE
EBV NA IGG SER QL IA: NEGATIVE
EBV VCA IGG SER QL IA: POSITIVE
EBV VCA IGM SER QL IA: NEGATIVE
ETHANOL SERPL-MCNC: 0.19 MG/DL (ref 0–0.9)

## 2025-08-07 NOTE — PROGRESS NOTES
Zahida Blanchard NP   [facility]  [address]  [place]  [address]      PATIENT NAME: Mally Woods  : 1965  DATE: 25  MRN: [id number]      Level of Service: DE OFFICE/OUTPT VISIT, RAUL SHANNAN III, 20-29 MIN  PCP: Zahida Blanchard, LOC     Reason for Visit / Chief Complaint: Fatigue (Pt states fatigue x 3 week. Pt would like to speak with Provider in regards to results form 2025 and 2025. )     History of Present Illness / Review of Systems   History of Present Illness    CHIEF COMPLAINT:  Patient presents with persistent weakness, fatigue, and episodes of near-syncope following a bout of food poisoning approximately one month ago.    HPI:  Patient had Campylobacter infection on  and was prescribed Zithromax. A week later, she became extremely weak and nearly lost consciousness at work. Her  took her to [facility], where her heart rate was 58. CT scan of her head, chest XR, and stomach x-ray were performed. She received IV fluids and was discharged with instructions to follow up with a cardiologist.    The next day, she attempted to work but returned home within an hour due to ongoing symptoms. Her  then took her to [facility]'s ER. She states her blood pressure and pulse was low at this ER visit. She was admitted overnight for telemetry monitoring and discharged the following day.    She reports ongoing episodes of extreme weakness, particularly in her arms and legs, and near-syncope. These episodes are characterized by lethargy and pallor. She has had similar episodes about once every 1.5 years in the past, previously attributed to vasovagal episodes, with profuse sweating and very lowhear rate (once recorded in the 40s).    She can sense the onset of these episodes, describing weakness and fatigue that prompts her to lie down.  She has had spells of dizziness and lightheadedness since .    She drinks water extensively and sips Pedialyte for  hydration. She also reports restless legs and unusual sensations in her feet. She denies vomiting, bowel or bladder incontinence, recent tick bites or rashes. She has been monitoring her glucose during these episodes with her husbands glucometer and it has been normal.    She has been on FMLA since July 10th due to these symptoms and has not driven her car since then.   She was seen by [facility] today and had heart monitor placed which she will wear for appro 2 weeks and return.   She has an upcoming appointment with her neurologist, Dr. Franchesca Hoff, on August 31st for ongoing treatment of migraines and occipital neuralgia.      ROS:  General: -fever, -chills, +fatigue, -weight gain, -weight loss, +muscle weakness  Eyes: -vision changes, -redness, -discharge, +dry eyes  ENT: -ear pain, -nasal congestion, -sore throat  Cardiovascular: -chest pain, -palpitations, -lower extremity edema  Respiratory: -cough, -shortness of breath  Gastrointestinal: -abdominal pain, -nausea, -vomiting, +diarrhea, -constipation, -blood in stool, +heartburn, +indigestion  Genitourinary: -dysuria, -hematuria, -frequency  Musculoskeletal: -joint pain, -muscle pain  Skin: -rash, -lesion  Neurological: -headache, +dizziness, -numbness, -tingling, +near-syncope, +tremors, +involuntary movements, +lightheadedness, +restless legs  Psychiatric: -anxiety, -depression, -sleep difficulty          Medical / Social / Family History     Past Medical History:   Diagnosis Date    Cervical disc disorder     Fibromyalgia     Hiatal hernia with GERD     Hyperlipidemia     Hypertension     Morbid obesity with BMI of 40.0-44.9, adult     Nonalcoholic fatty liver disease     Stroke     Thoracic aortic aneurysm     Unspecified osteoarthritis, unspecified site     Vitamin D deficiency 12/22/2022       Past Surgical History:   Procedure Laterality Date    CARDIAC CATHETERIZATION Left 07/31/2008    Performed by Dr. Bruce Cramer    CARPAL TUNNEL RELEASE   12/21/2017    Dr. Moran with cyst removed from left middle finger    CHOLECYSTECTOMY      COLONOSCOPY      DIAGNOSTIC LAPAROSCOPY      DILATION AND CURETTAGE OF UTERUS      EGD, WITH BALLOON DILATION  2018    Performed by Dr. Marc Lomax    LEFT HEART CATHETERIZATION  05/25/2023    TOTAL REDUCTION MAMMOPLASTY         Medications and Allergies     Outpatient Medications Marked as Taking for the 7/22/25 encounter (Office Visit) with Zahida Blanchard NP   Medication Sig Dispense Refill    lisinopriL (PRINIVIL,ZESTRIL) 5 MG tablet Take 1 tablet (5 mg total) by mouth once daily. 90 tablet 1       Review of patient's allergies indicates:   Allergen Reactions    Penicillins Hives and Rash     Can take rocephin  Other reaction(s): > 10 years ago  Can take rocephin      Azithromycin     Celecoxib     Ciprofloxacin      States she can not take them due to aneurism     Fentanyl Other (See Comments)     Extreme sedation  Other reaction(s): Other (See Comments)  Extreme sedation  Extreme sedation      Hydrochlorothiazide Other (See Comments)     Reaction unknown  Other reaction(s): Other (See Comments)  Reaction unknown  Reaction unknown    Hydromorphone Other (See Comments)    Promethazine hcl Other (See Comments)     Hallucinations    Quinolones Other (See Comments)    Betamethasone Other (See Comments) and Rash     Flushing  Flushing  Flushing    Hydromorphone hcl Palpitations    Latex Rash    Morphine Palpitations and Other (See Comments)    Sulfa (sulfonamide antibiotics) Itching       Physical Examination     Vitals:    07/22/25 1324   BP: 124/72   Pulse: 65   Resp: 18   Temp: 98.2 °F (36.8 °C)     Physical Exam  Vitals and nursing note reviewed.   Constitutional:       Appearance: She is obese.   HENT:      Head: Normocephalic.      Nose: Nose normal.      Mouth/Throat:      Mouth: Mucous membranes are moist.      Pharynx: Oropharynx is clear. No posterior oropharyngeal erythema.   Eyes:      Conjunctiva/sclera:  Conjunctivae normal.   Cardiovascular:      Rate and Rhythm: Normal rate and regular rhythm.      Pulses: Normal pulses.      Heart sounds: Normal heart sounds.   Pulmonary:      Effort: Pulmonary effort is normal.      Breath sounds: Normal breath sounds.   Abdominal:      General: Abdomen is flat. Bowel sounds are normal. There is no distension.      Palpations: Abdomen is soft.   Musculoskeletal:         General: No swelling or tenderness. Normal range of motion.      Cervical back: Normal range of motion.      Right lower leg: No edema.      Left lower leg: No edema.   Skin:     General: Skin is warm and dry.      Capillary Refill: Capillary refill takes less than 2 seconds.   Neurological:      Mental Status: She is alert. Mental status is at baseline.      Cranial Nerves: Cranial nerves 2-12 are intact.      Motor: No weakness.      Coordination: Coordination is intact.      Gait: Gait is intact.      Deep Tendon Reflexes: Reflexes are normal and symmetric.   Psychiatric:         Mood and Affect: Mood normal.         Behavior: Behavior normal.                 Assessment and Plan      Problem List Items Addressed This Visit    None  Visit Diagnoses         Weakness    -  Primary    Relevant Orders    Comprehensive Metabolic Panel (Completed)    CBC Auto Differential (Completed)    Iron and TIBC (Completed)    Ferritin (Completed)    Sedimentation Rate (Completed)    CURT EIA w/ Reflex to dsDNA/VERNON (Completed)    Vitamin D (Completed)    Vitamin B12 & Folate (Completed)    Yolis-Barr Virus (EBV) Antibody Panel (Completed)            Assessment & Plan    IMPRESSION:  Reviewed recent history of suspected food poisoning (campylobacter) treated with Zithromax, followed by episodes of weakness and near-syncope.  Assessed recent ER visits and cardiac workup, which showed normal results.  Considered possible autoimmune etiology given persistent symptoms despite normal cardiac findings.  Evaluated for potential  neurological causes  Ruled out anemia and electrolyte imbalances based on recent normal lab results.  Reviewed recent cervical spine MRI results, noting degenerative changes but no significant abnormalities.  Explained that dehydration from prolonged diarrhea could have initially contributed to symptoms, but unlikely to be the ongoing cause given the time elapsed and fluid repletion.  Explained that normal electrolyte levels suggest adequate hydration status.      NEAR SYNCOPE:  - Patient experienced weakness and near-syncope at work, resulting in ER visits.  - Has history of vasovagal episodes with weakness, lethargy, and low blood pressure.  - Current heart rate 65, compared to 58 during ER visit.  - Discussed potential causes including dehydration and electrolyte imbalance.  - Ordered electrolyte panel.  - Advised continued monitoring of heart rate and symptoms with cardiologist follow-up.    MIGRAINE:  - Patient currently under neurologist's care for migraines.  - Instructed to message neurologist's office via patient   portal regarding current symptoms and upcoming appointment to see if she can come in sooner.     CERVICAL DISC DEGENERATION:  - Patient has cervical disc degeneration with small disc protrusions at multiple levels, including a broad-based protrusion at C5-C6.  - Cervical cord is normal in caliber and signal without intrinsic mass lesion or edema.    FOLLOW-UP AND ADDITIONAL TESTS:  - Ordered inflammatory markers for autoimmune evaluation, Vitamin D level, and B12 level.  - Explained that autoimmune conditions can sometimes be triggered by stressors such as recent GI illness.  - Patient advised to continue to push fluids and follow balanced iet.  - Follow up as walk-in if symptoms worsen       FOLLOW UP  Will call with lab results.   Follow up as needed.      Health Maintenance     Health Maintenance Topics with due status: Not Due       Topic Last Completion Date    Colorectal Cancer Screening  12/14/2015    Cervical Cancer Screening 02/23/2023    Influenza Vaccine 03/05/2024    Lipid Panel 09/09/2024    Hemoglobin A1c (Diabetic Prevention Screening) 01/10/2025       Health Maintenance Due   Topic Date Due    Hepatitis C Screening  Never done    HIV Screening  Never done    TETANUS VACCINE  Never done    Pneumococcal Vaccines (Age 50+) (1 of 2 - PCV) Never done    Shingles Vaccine (1 of 2) Never done    COVID-19 Vaccine (1 - 2024-25 season) Never done    RSV Vaccine (Age 60+ and Pregnant patients) (1 - Risk 60-74 years 1-dose series) Never done    Mammogram  08/06/2025          Signature:  Zahida Blanchard NP  [place] [id number] [place]  [address]    Date of encounter: 7/22/25    This note was generated with the assistance of ambient listening technology. Verbal consent was obtained by the patient and accompanying visitor(s) for the recording of patient appointment to facilitate this note. I attest to having reviewed and edited the generated note for accuracy, though some syntax or spelling errors may persist. Please contact the author of this note for any clarification.

## 2025-08-21 DIAGNOSIS — E55.9 VITAMIN D DEFICIENCY: ICD-10-CM

## 2025-08-21 RX ORDER — ERGOCALCIFEROL 1.25 MG/1
50000 CAPSULE ORAL
Qty: 12 CAPSULE | Refills: 0 | Status: SHIPPED | OUTPATIENT
Start: 2025-08-21

## 2025-08-26 ENCOUNTER — TELEPHONE (OUTPATIENT)
Dept: FAMILY MEDICINE | Facility: CLINIC | Age: 60
End: 2025-08-26
Payer: COMMERCIAL

## 2025-09-05 DIAGNOSIS — I10 HYPERTENSION, UNSPECIFIED TYPE: ICD-10-CM

## 2025-09-05 RX ORDER — LISINOPRIL 2.5 MG/1
2.5 TABLET ORAL DAILY
Qty: 30 TABLET | Refills: 0 | Status: SHIPPED | OUTPATIENT
Start: 2025-09-05 | End: 2026-09-05

## 2025-09-05 RX ORDER — LISINOPRIL 5 MG/1
5 TABLET ORAL DAILY
Qty: 90 TABLET | Refills: 1 | Status: SHIPPED | OUTPATIENT
Start: 2025-09-05